# Patient Record
Sex: FEMALE | Race: WHITE | NOT HISPANIC OR LATINO
[De-identification: names, ages, dates, MRNs, and addresses within clinical notes are randomized per-mention and may not be internally consistent; named-entity substitution may affect disease eponyms.]

---

## 2017-02-20 ENCOUNTER — RX RENEWAL (OUTPATIENT)
Age: 47
End: 2017-02-20

## 2017-03-09 ENCOUNTER — RX RENEWAL (OUTPATIENT)
Age: 47
End: 2017-03-09

## 2017-04-10 ENCOUNTER — RX RENEWAL (OUTPATIENT)
Age: 47
End: 2017-04-10

## 2017-05-26 LAB — HBA1C MFR BLD HPLC: 6.2

## 2017-05-30 ENCOUNTER — APPOINTMENT (OUTPATIENT)
Dept: FAMILY MEDICINE | Facility: CLINIC | Age: 47
End: 2017-05-30

## 2017-05-30 VITALS
OXYGEN SATURATION: 98 % | TEMPERATURE: 98.5 F | BODY MASS INDEX: 28.71 KG/M2 | HEIGHT: 62 IN | HEART RATE: 72 BPM | DIASTOLIC BLOOD PRESSURE: 70 MMHG | WEIGHT: 156 LBS | SYSTOLIC BLOOD PRESSURE: 120 MMHG | RESPIRATION RATE: 14 BRPM

## 2017-10-26 ENCOUNTER — APPOINTMENT (OUTPATIENT)
Dept: FAMILY MEDICINE | Facility: CLINIC | Age: 47
End: 2017-10-26
Payer: MEDICARE

## 2017-10-26 VITALS — TEMPERATURE: 98.3 F | DIASTOLIC BLOOD PRESSURE: 60 MMHG | SYSTOLIC BLOOD PRESSURE: 111 MMHG

## 2017-10-26 PROCEDURE — G0008: CPT

## 2017-10-26 PROCEDURE — 90688 IIV4 VACCINE SPLT 0.5 ML IM: CPT

## 2018-05-10 ENCOUNTER — OTHER (OUTPATIENT)
Age: 48
End: 2018-05-10

## 2018-06-04 ENCOUNTER — APPOINTMENT (OUTPATIENT)
Dept: FAMILY MEDICINE | Facility: CLINIC | Age: 48
End: 2018-06-04
Payer: MEDICARE

## 2018-06-04 VITALS
TEMPERATURE: 98.1 F | DIASTOLIC BLOOD PRESSURE: 76 MMHG | RESPIRATION RATE: 14 BRPM | WEIGHT: 168 LBS | SYSTOLIC BLOOD PRESSURE: 122 MMHG | OXYGEN SATURATION: 97 % | BODY MASS INDEX: 30.91 KG/M2 | HEART RATE: 70 BPM | HEIGHT: 62 IN

## 2018-06-04 PROCEDURE — G0439: CPT

## 2018-06-07 LAB — HBA1C MFR BLD HPLC: 6

## 2018-10-15 ENCOUNTER — APPOINTMENT (OUTPATIENT)
Dept: FAMILY MEDICINE | Facility: CLINIC | Age: 48
End: 2018-10-15
Payer: MEDICARE

## 2018-10-15 VITALS — TEMPERATURE: 97.6 F | SYSTOLIC BLOOD PRESSURE: 110 MMHG | DIASTOLIC BLOOD PRESSURE: 80 MMHG

## 2018-10-15 VITALS — HEART RATE: 64 BPM | OXYGEN SATURATION: 97 %

## 2018-10-15 DIAGNOSIS — T14.8XXA OTHER INJURY OF UNSPECIFIED BODY REGION, INITIAL ENCOUNTER: ICD-10-CM

## 2018-10-15 PROCEDURE — G0008: CPT

## 2018-10-15 PROCEDURE — 99213 OFFICE O/P EST LOW 20 MIN: CPT

## 2018-10-15 PROCEDURE — 90686 IIV4 VACC NO PRSV 0.5 ML IM: CPT
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
## 2018-10-15 NOTE — PHYSICAL EXAM
[No Acute Distress] : no acute distress [Clear to Auscultation] : lungs were clear to auscultation bilaterally [Alert and Oriented x3] : oriented to person, place, and time [de-identified] : irreg Gr 3/6 systolic Murmur [de-identified] : small mass non tender hematoma? in LLQ [de-identified] : ecchymosis diffuse on her right shoulder , chest and lower abdomen

## 2019-03-06 ENCOUNTER — APPOINTMENT (OUTPATIENT)
Dept: FAMILY MEDICINE | Facility: CLINIC | Age: 49
End: 2019-03-06
Payer: MEDICARE

## 2019-03-06 VITALS
TEMPERATURE: 98.1 F | HEIGHT: 62 IN | RESPIRATION RATE: 14 BRPM | WEIGHT: 169 LBS | BODY MASS INDEX: 31.1 KG/M2 | DIASTOLIC BLOOD PRESSURE: 78 MMHG | SYSTOLIC BLOOD PRESSURE: 110 MMHG | OXYGEN SATURATION: 96 % | HEART RATE: 66 BPM

## 2019-03-06 PROCEDURE — 99214 OFFICE O/P EST MOD 30 MIN: CPT

## 2019-07-22 ENCOUNTER — APPOINTMENT (OUTPATIENT)
Dept: FAMILY MEDICINE | Facility: CLINIC | Age: 49
End: 2019-07-22
Payer: MEDICARE

## 2019-07-22 VITALS
DIASTOLIC BLOOD PRESSURE: 70 MMHG | BODY MASS INDEX: 29.63 KG/M2 | WEIGHT: 161 LBS | HEIGHT: 62 IN | SYSTOLIC BLOOD PRESSURE: 115 MMHG | HEART RATE: 68 BPM | OXYGEN SATURATION: 98 % | RESPIRATION RATE: 14 BRPM | TEMPERATURE: 97.9 F

## 2019-07-22 PROCEDURE — G0439: CPT

## 2019-07-22 NOTE — PLAN
[FreeTextEntry1] : Health Maintenance\par - F/u in October \par - FIT test \par \par Encouraged exercise and weight los

## 2019-07-22 NOTE — ADDENDUM
[FreeTextEntry1] : I, Ann Berry, acted solely as a scribe for Dr. Spence on this date 07/22/2019.\par \par All medical record entries made by the Scribe were at my, Dr. Spence, direction and personally dictated by me on 07/22/2019. I have reviewed the chart and agree that the record accurately reflects my personal performance of the history, physical exam, assessment and plan.  I have also personally directed, reviewed and agreed with the chart.

## 2019-07-22 NOTE — REVIEW OF SYSTEMS
[Recent Change In Weight] : ~T recent weight change [Negative] : Neurological [Fever] : no fever [Chills] : no chills [Chest Pain] : no chest pain [Shortness Of Breath] : no shortness of breath [Nausea] : no nausea [Vomiting] : no vomiting [Headache] : no headache [FreeTextEntry2] : 8lbs

## 2019-07-22 NOTE — HISTORY OF PRESENT ILLNESS
[de-identified] : 48y/o F with PMHx of Afib, Left ventricular dysfunction, Valvular disease, Graves, Hyperglycemia, HTN (Ramipril, Metoprolol) and Hypothyroidism  presents to the office for routine Medicare Annual Wellness Exam. Offers no new complaints at this time. Pt will be having echocardiogram  in a few weeks. Pt has f/u with Cardiology in 2 months and f/u with Endo in 1 month. Pt presents with 8lb weight loss, has been consistent with monitoring diet. Pt does yoga for exercise. All routine labs reviewed with patient and WNL except for HbA1c at 6.0. BP in office is 115/70 and Bglu is 90. Last eye exam UTD. Denies HA, CP, SOB, N/V/D, fever or chills.   Had D&C few months ago NEG result\par

## 2019-07-22 NOTE — PHYSICAL EXAM
[Normal] : no carotid bruits, no abdominal bruit, no varicosities, pedal pulses present, no edema, no extremity clubbing/cyanosis, no palpable aorta [de-identified] :  irreg irreg [de-identified] : Varicose veins L more than R [de-identified] : Warm, dry, intact.  No rashes.  [de-identified] : AA&Ox3

## 2019-07-24 LAB — CYTOLOGY CVX/VAG DOC THIN PREP: NORMAL

## 2019-08-01 LAB — HEMOCCULT STL QL IA: NEGATIVE

## 2019-09-27 ENCOUNTER — RX RENEWAL (OUTPATIENT)
Age: 49
End: 2019-09-27

## 2019-10-22 ENCOUNTER — APPOINTMENT (OUTPATIENT)
Dept: FAMILY MEDICINE | Facility: CLINIC | Age: 49
End: 2019-10-22
Payer: MEDICARE

## 2019-10-22 PROCEDURE — 90686 IIV4 VACC NO PRSV 0.5 ML IM: CPT

## 2019-10-22 PROCEDURE — G0008: CPT

## 2020-05-05 ENCOUNTER — APPOINTMENT (OUTPATIENT)
Dept: FAMILY MEDICINE | Facility: CLINIC | Age: 50
End: 2020-05-05
Payer: MEDICARE

## 2020-05-05 DIAGNOSIS — T30.0 BURN OF UNSPECIFIED BODY REGION, UNSPECIFIED DEGREE: ICD-10-CM

## 2020-05-05 DIAGNOSIS — T22.20XA BURN OF SECOND DEGREE OF SHOULDER AND UPPER LIMB, EXCEPT WRIST AND HAND, UNSPECIFIED SITE, INITIAL ENCOUNTER: ICD-10-CM

## 2020-05-05 PROCEDURE — 99214 OFFICE O/P EST MOD 30 MIN: CPT
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
## 2020-05-27 DIAGNOSIS — D64.9 ANEMIA, UNSPECIFIED: ICD-10-CM

## 2020-06-02 ENCOUNTER — APPOINTMENT (OUTPATIENT)
Dept: FAMILY MEDICINE | Facility: CLINIC | Age: 50
End: 2020-06-02
Payer: MEDICARE

## 2020-06-02 VITALS
OXYGEN SATURATION: 97 % | HEART RATE: 73 BPM | BODY MASS INDEX: 30 KG/M2 | DIASTOLIC BLOOD PRESSURE: 80 MMHG | WEIGHT: 163 LBS | RESPIRATION RATE: 14 BRPM | HEIGHT: 62 IN | TEMPERATURE: 98.6 F | SYSTOLIC BLOOD PRESSURE: 116 MMHG

## 2020-06-02 PROCEDURE — 99214 OFFICE O/P EST MOD 30 MIN: CPT
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
## 2020-06-02 NOTE — ASSESSMENT
[No Further Testing Recommended] : no further testing recommended [Patient Optimized for Surgery] : Patient optimized for surgery [FreeTextEntry5] : pt stopped Coumadin 48 hours before surgery [FreeTextEntry7] : will continue   Metoprolol on day of surgery
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
## 2020-06-02 NOTE — RESULTS/DATA
[de-identified] : normal [] : results reviewed [de-identified] : Shelley @56 (unchanged from previous)

## 2020-08-12 LAB
INR PPP: 2.16
PROTHROMBIN TIME COMMENT: NORMAL
PT BLD: 23.2

## 2020-09-04 LAB
INR PPP: 3.08
PROTHROMBIN TIME COMMENT: NORMAL
PT BLD: 32.4

## 2020-10-05 LAB
INR PPP: 3.11
PROTHROMBIN TIME COMMENT: NORMAL
PT BLD: 32.7

## 2020-10-06 LAB
INR PPP: 2.4
PROTHROMBIN TIME COMMENT: NORMAL
PT BLD: 25.6

## 2020-10-20 LAB
INR PPP: 1.67
PROTHROMBIN TIME COMMENT: NORMAL

## 2020-10-26 ENCOUNTER — APPOINTMENT (OUTPATIENT)
Dept: FAMILY MEDICINE | Facility: CLINIC | Age: 50
End: 2020-10-26
Payer: MEDICARE

## 2020-10-26 VITALS
TEMPERATURE: 97.6 F | HEIGHT: 62 IN | DIASTOLIC BLOOD PRESSURE: 80 MMHG | RESPIRATION RATE: 14 BRPM | OXYGEN SATURATION: 99 % | SYSTOLIC BLOOD PRESSURE: 122 MMHG | WEIGHT: 157 LBS | HEART RATE: 77 BPM | BODY MASS INDEX: 28.89 KG/M2

## 2020-10-26 PROCEDURE — G0008: CPT

## 2020-10-26 PROCEDURE — 90686 IIV4 VACC NO PRSV 0.5 ML IM: CPT

## 2020-10-26 PROCEDURE — G0439: CPT

## 2020-10-29 LAB
INR PPP: 3.28
PROTHROMBIN TIME COMMENT: NORMAL
PT BLD: 34.3

## 2020-11-10 DIAGNOSIS — R92.2 INCONCLUSIVE MAMMOGRAM: ICD-10-CM

## 2020-11-13 LAB
INR PPP: 2.73
PT BLD: 28.9

## 2020-11-17 ENCOUNTER — NON-APPOINTMENT (OUTPATIENT)
Age: 50
End: 2020-11-17

## 2020-11-17 ENCOUNTER — APPOINTMENT (OUTPATIENT)
Dept: CARDIOLOGY | Facility: CLINIC | Age: 50
End: 2020-11-17
Payer: MEDICARE

## 2020-11-17 VITALS
WEIGHT: 159 LBS | BODY MASS INDEX: 29.08 KG/M2 | TEMPERATURE: 97.6 F | OXYGEN SATURATION: 97 % | HEART RATE: 75 BPM | DIASTOLIC BLOOD PRESSURE: 86 MMHG | SYSTOLIC BLOOD PRESSURE: 124 MMHG

## 2020-11-17 PROCEDURE — 99204 OFFICE O/P NEW MOD 45 MIN: CPT

## 2020-11-17 PROCEDURE — 93000 ELECTROCARDIOGRAM COMPLETE: CPT

## 2020-11-17 PROCEDURE — 99214 OFFICE O/P EST MOD 30 MIN: CPT

## 2020-11-19 NOTE — ASSESSMENT
[FreeTextEntry1] : Dominique Moran continues to do well from a cardiac point of view with atrial fibrillation with a controlled ventricular response, no clear evidence of CHF despite moderate LV dysfunction and moderate to severe tricuspid insufficiency.  She is tolerating her present regimen of medications\par \par 1.  Congenital heart disease\par 2.  Status post mitral valve replacement tricuspid valve replacement residual tricuspid insufficiency and dilated right ventricle presently asymptomatic\par 3.  Atrial fibrillation with controlled ventricular response\par 4.  On long-term anticoagulation tolerating Coumadin with INRs in good range between 2 and 2.5\par 5.  Cardiomyopathy LV function has improved last ejection fraction 43% no overt CHF excellent exercise tolerance
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
## 2020-11-19 NOTE — DISCUSSION/SUMMARY
[FreeTextEntry1] : The patient continue on her present regimen of medications.  We will maintain INR between 2 and 2.5 on Coumadin.\par \par Within the next 6 months we will repeat an echocardiogram to reevaluate her tricuspid insufficiency and LV function.\par \par There may come a point in which a tricuspid valve procedure perhaps percutaneous may need to be considered if RV function further diminishes or RV dilates further\par \par Follow-up with me again in 3 months

## 2020-11-19 NOTE — REASON FOR VISIT
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

## 2020-12-04 LAB
INR PPP: 2.76
PROTHROMBIN TIME COMMENT: NORMAL
PT BLD: 29.2

## 2020-12-11 ENCOUNTER — RX RENEWAL (OUTPATIENT)
Age: 50
End: 2020-12-11

## 2020-12-15 LAB
INR PPP: 2.68
PROTHROMBIN TIME COMMENT: NORMAL
PT BLD: 28.4

## 2021-01-07 LAB
INR PPP: 2.53
PROTHROMBIN TIME COMMENT: NORMAL
PT BLD: 26.9

## 2021-01-15 ENCOUNTER — NON-APPOINTMENT (OUTPATIENT)
Age: 51
End: 2021-01-15

## 2021-01-22 LAB
INR PPP: 2.23
PROTHROMBIN TIME COMMENT: NORMAL
PT BLD: 23.9

## 2021-02-04 LAB
INR PPP: 2.19
PROTHROMBIN TIME COMMENT: NORMAL
PT BLD: 23.5

## 2021-02-16 ENCOUNTER — APPOINTMENT (OUTPATIENT)
Dept: CARDIOLOGY | Facility: CLINIC | Age: 51
End: 2021-02-16
Payer: MEDICARE

## 2021-02-16 VITALS
HEART RATE: 80 BPM | OXYGEN SATURATION: 97 % | SYSTOLIC BLOOD PRESSURE: 124 MMHG | DIASTOLIC BLOOD PRESSURE: 76 MMHG | BODY MASS INDEX: 29.45 KG/M2 | WEIGHT: 161 LBS

## 2021-02-16 PROCEDURE — 99213 OFFICE O/P EST LOW 20 MIN: CPT
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
## 2021-03-01 ENCOUNTER — NON-APPOINTMENT (OUTPATIENT)
Age: 51
End: 2021-03-01

## 2021-03-01 LAB
INR PPP: 2.35
PROTHROMBIN TIME COMMENT: NORMAL
PT BLD: 25.1

## 2021-03-19 ENCOUNTER — OUTPATIENT (OUTPATIENT)
Dept: OUTPATIENT SERVICES | Facility: HOSPITAL | Age: 51
LOS: 1 days | End: 2021-03-19
Payer: MEDICARE

## 2021-03-19 ENCOUNTER — APPOINTMENT (OUTPATIENT)
Dept: CV DIAGNOSITCS | Facility: HOSPITAL | Age: 51
End: 2021-03-19

## 2021-03-19 DIAGNOSIS — I48.91 UNSPECIFIED ATRIAL FIBRILLATION: ICD-10-CM

## 2021-03-19 LAB
INR PPP: 2.33
PROTHROMBIN TIME COMMENT: NORMAL
PT BLD: 23

## 2021-03-19 PROCEDURE — C8929: CPT

## 2021-03-19 PROCEDURE — 93306 TTE W/DOPPLER COMPLETE: CPT | Mod: 26

## 2021-03-26 ENCOUNTER — APPOINTMENT (OUTPATIENT)
Dept: CARDIOLOGY | Facility: CLINIC | Age: 51
End: 2021-03-26
Payer: MEDICARE

## 2021-03-26 ENCOUNTER — NON-APPOINTMENT (OUTPATIENT)
Age: 51
End: 2021-03-26

## 2021-03-26 VITALS
WEIGHT: 158 LBS | BODY MASS INDEX: 29.08 KG/M2 | DIASTOLIC BLOOD PRESSURE: 58 MMHG | HEART RATE: 73 BPM | SYSTOLIC BLOOD PRESSURE: 115 MMHG | HEIGHT: 62 IN | OXYGEN SATURATION: 98 %

## 2021-03-26 DIAGNOSIS — Q21.0 VENTRICULAR SEPTAL DEFECT: ICD-10-CM

## 2021-03-26 PROCEDURE — 99213 OFFICE O/P EST LOW 20 MIN: CPT

## 2021-03-26 PROCEDURE — 93000 ELECTROCARDIOGRAM COMPLETE: CPT

## 2021-04-02 LAB
INR PPP: 1.81
PROTHROMBIN TIME COMMENT: NORMAL
PT BLD: 19.3

## 2021-04-09 LAB
INR PPP: 1.52
PROTHROMBIN TIME COMMENT: NORMAL
PT BLD: 15.6

## 2021-04-18 LAB
INR PPP: 2.03
PROTHROMBIN TIME COMMENT: NORMAL
PT BLD: 20.3

## 2021-04-29 LAB
INR PPP: 1.95
PROTHROMBIN TIME COMMENT: NORMAL
PT BLD: 19.6

## 2021-05-03 NOTE — HISTORY OF PRESENT ILLNESS
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  She is status post tricuspid and mitral valve replacement.  She has chronic atrial fibrillation\par \par She is done extremely well and functions at a very good level living independently in her own home and working.  She exercises regularly.  She denies chest pain chest pressure shortness of breath edema orthopnea PND.  She has good exercise tolerance\par \par She has a history of hypothyroidism on replacement\par \par Recent echocardiogram again revealed moderate LV dysfunction with moderate tricuspid insufficiency and mild to moderate mitral insufficiency.  On echo of 2020 tricuspid insufficiency was read as being severe\par \par Again presently she is asymptomatic\par \par EKG today unchanged A. fib right bundle branch block left axis deviation ventricular rate controlled

## 2021-05-03 NOTE — DISCUSSION/SUMMARY
[FreeTextEntry1] : Dominique is quite stable from a cardiac point of view.  Her atrial fibrillation is well controlled, there is no evidence of CHF and she is tolerating her present regimen of medications.  She has no overt evidence of CHF and is asymptomatic\par \par Her most the recent echo at [facility] revealed only mild to moderate tricuspid insufficiency where in the past that showed severe tricuspid insufficiency.  This have may have underestimated the significance of the tricuspid insufficiency which we need to watch carefully\par \par I see no cardiac contraindication to the planned colonoscopy\par Addendum:\par The patient remains asymptomatic from a cardiac point of view and her echo appears to be stable.  There is no indication at the present time for consideration of tricuspid valve surgery etc.  She is doing extremely well on her present medical regimen and her echocardiogram is stable\par \par She will continue to be active and exercise and continue present medications and follow-up with me again in 3 months\par \par Alden Gale MD\par She can hold Coumadin 3 days prior to the procedure but should start immediately thereafter

## 2021-05-03 NOTE — PHYSICAL EXAM
[Normal Appearance] : normal appearance [General Appearance - Well Nourished] : well nourished [General Appearance - In No Acute Distress] : no acute distress [Respiration, Rhythm And Depth] : normal respiratory rhythm and effort [Auscultation Breath Sounds / Voice Sounds] : lungs were clear to auscultation bilaterally [Heart Sounds] : normal S1 and S2 [Cyanosis, Localized] : no localized cyanosis [FreeTextEntry1] : No edema

## 2021-05-03 NOTE — REASON FOR VISIT
[FreeTextEntry1] : Dominique Dinh 51 years old seen for preop evaluation prior to undergoing colonoscopy.  The colonoscopy is a routine

## 2021-05-12 LAB
INR PPP: 2.79
PROTHROMBIN TIME COMMENT: NORMAL
PT BLD: 27.2

## 2021-06-01 LAB
INR PPP: 2.33
PROTHROMBIN TIME COMMENT: NORMAL
PT BLD: 23

## 2021-06-10 ENCOUNTER — APPOINTMENT (OUTPATIENT)
Dept: CARDIOLOGY | Facility: CLINIC | Age: 51
End: 2021-06-10
Payer: MEDICARE

## 2021-06-10 ENCOUNTER — NON-APPOINTMENT (OUTPATIENT)
Age: 51
End: 2021-06-10

## 2021-06-10 VITALS
DIASTOLIC BLOOD PRESSURE: 76 MMHG | WEIGHT: 156 LBS | HEART RATE: 65 BPM | HEIGHT: 62 IN | SYSTOLIC BLOOD PRESSURE: 119 MMHG | OXYGEN SATURATION: 98 % | BODY MASS INDEX: 28.71 KG/M2

## 2021-06-10 PROCEDURE — 99214 OFFICE O/P EST MOD 30 MIN: CPT

## 2021-06-10 PROCEDURE — 93000 ELECTROCARDIOGRAM COMPLETE: CPT

## 2021-06-10 NOTE — PHYSICAL EXAM
[Normal Appearance] : normal appearance [General Appearance - Well Nourished] : well nourished [General Appearance - In No Acute Distress] : no acute distress [Respiration, Rhythm And Depth] : normal respiratory rhythm and effort [Auscultation Breath Sounds / Voice Sounds] : lungs were clear to auscultation bilaterally [Heart Sounds] : normal S1 and S2 [Cyanosis, Localized] : no localized cyanosis [de-identified] : Mild JVD [FreeTextEntry1] : No edema.  The patient was wearing shorts today and she has significant varicosities in the upper thigh area and in the lower extremity no redness no hematoma no significant ecchymoses

## 2021-06-10 NOTE — REASON FOR VISIT
[FreeTextEntry1] : Dominique Erik Cueto 51 years old here for routine follow-up of her cardiac status with known congenital heart disease status post repair.

## 2021-06-10 NOTE — DISCUSSION/SUMMARY
[FreeTextEntry1] : Dominique is quite stable from a cardiac point of view.  Her atrial fibrillation is well controlled, there is no evidence of CHF and she is tolerating her present regimen of medications.  She has no overt evidence of CHF and is asymptomatic\par \par Her most the recent echo at [facility] revealed only mild to moderate tricuspid insufficiency where in the past that showed severe tricuspid insufficiency.  This have may have underestimated the significance of the tricuspid insufficiency which we need to watch carefully.  She does have significant varicosities of the lower extremities but has no significant ecchymoses or hematoma\par \par She will continue to be active and exercise and continue present medications and follow-up with me again in 3 months.  No further cardiac work-up is needed\par \par
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
## 2021-07-09 LAB
INR PPP: 2.29
PROTHROMBIN TIME COMMENT: NORMAL
PT BLD: 22.7

## 2021-08-16 LAB
INR PPP: 2.19
PROTHROMBIN TIME COMMENT: NORMAL

## 2021-08-17 ENCOUNTER — APPOINTMENT (OUTPATIENT)
Dept: RADIOLOGY | Facility: CLINIC | Age: 51
End: 2021-08-17
Payer: MEDICARE

## 2021-08-17 PROCEDURE — 77085 DXA BONE DENSITY AXL VRT FX: CPT

## 2021-09-09 ENCOUNTER — NON-APPOINTMENT (OUTPATIENT)
Age: 51
End: 2021-09-09

## 2021-09-09 ENCOUNTER — APPOINTMENT (OUTPATIENT)
Dept: CARDIOLOGY | Facility: CLINIC | Age: 51
End: 2021-09-09
Payer: MEDICARE

## 2021-09-09 VITALS
DIASTOLIC BLOOD PRESSURE: 88 MMHG | BODY MASS INDEX: 28.53 KG/M2 | SYSTOLIC BLOOD PRESSURE: 112 MMHG | HEART RATE: 72 BPM | WEIGHT: 156 LBS | OXYGEN SATURATION: 98 %

## 2021-09-09 PROCEDURE — 99214 OFFICE O/P EST MOD 30 MIN: CPT

## 2021-09-09 PROCEDURE — 93000 ELECTROCARDIOGRAM COMPLETE: CPT

## 2021-09-09 NOTE — HISTORY OF PRESENT ILLNESS
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  She is status post tricuspid and mitral valve replacement.  She has chronic atrial fibrillation\par \par She is done extremely well and functions at a very good level living independently in her own home and working.  She exercises regularly.  She denies chest pain chest pressure shortness of breath edema orthopnea PND.  She has good exercise tolerance\par \par She has a history of hypothyroidism on replacement\par \par Recent echocardiogram again revealed moderate LV dysfunction with moderate tricuspid insufficiency and mild to moderate mitral insufficiency.  On echo of 2020 tricuspid insufficiency was read as being severe\par \par Again presently she is asymptomatic\par \par EKG last visit revealed A. fib right bundle branch block left axis deviation ventricular rate controlled\par \par Since the last visit, she continues to do well.  She denies chest pain shortness of breath palpitations edema orthopnea PND\par \par EKG today again unchanged right bundle branch block A. fib ventricular rate controlled\par \par There has been no significant change in his status since last visit.  She is active does yoga her regularly has no edema orthopnea PND\par \par \par \par Last INR was 2.3 on Coumadin 10 mg 5 days a week 7.5 2 days a week

## 2021-09-09 NOTE — PHYSICAL EXAM
[Well Developed] : well developed [Well Nourished] : well nourished [Normal Conjunctiva] : normal conjunctiva [Normal S1, S2] : normal S1, S2 [Clear Lung Fields] : clear lung fields [Soft] : abdomen soft [Non Tender] : non-tender [No Edema] : no edema [Normal Appearance] : normal appearance [General Appearance - Well Nourished] : well nourished [General Appearance - In No Acute Distress] : no acute distress [Respiration, Rhythm And Depth] : normal respiratory rhythm and effort [Auscultation Breath Sounds / Voice Sounds] : lungs were clear to auscultation bilaterally [Heart Sounds] : normal S1 and S2 [Cyanosis, Localized] : no localized cyanosis [de-identified] : No significant neck vein distention [de-identified] : 1/6 to 2/6 murmur at the apex and left sternal border no S3 irregularly irregular pulse [de-identified] : Slight varicosities of the lower extremities no redness pulses 2+ dorsalis pedis [FreeTextEntry1] : No edema.

## 2021-09-09 NOTE — ASSESSMENT
[FreeTextEntry1] : Dominique is quite stable from a cardiac point of view.  Her atrial fibrillation is well controlled, there is no evidence of CHF and she is tolerating her present regimen of medications.  She has no overt evidence of CHF and is asymptomatic\par \par Her most the recent echo at [facility] revealed only mild to moderate tricuspid insufficiency where in the past that showed severe tricuspid insufficiency.  This have may have underestimated the significance of the tricuspid insufficiency which we need to watch carefully.\par \par She continues to function at an excellent level.  She is asymptomatic and tolerating her present regimen of medications\par

## 2021-09-09 NOTE — DISCUSSION/SUMMARY
[FreeTextEntry1] : Dominique remains quite stable from a cardiac point of view tolerating her present regimen of medications\par She will continue to be active and exercise and continue present medications and follow-up with me again in 3 months.  No further cardiac work-up is needed\par \par

## 2021-09-21 ENCOUNTER — APPOINTMENT (OUTPATIENT)
Dept: FAMILY MEDICINE | Facility: CLINIC | Age: 51
End: 2021-09-21
Payer: MEDICARE

## 2021-09-21 ENCOUNTER — NON-APPOINTMENT (OUTPATIENT)
Age: 51
End: 2021-09-21

## 2021-09-21 PROCEDURE — 90686 IIV4 VACC NO PRSV 0.5 ML IM: CPT

## 2021-09-21 PROCEDURE — G0008: CPT

## 2021-09-29 LAB
INR PPP: 2.11
PT BLD: 21

## 2021-10-29 LAB
INR PPP: 2.14
PROTHROMBIN TIME COMMENT: NORMAL
PT BLD: 21.3

## 2021-12-06 LAB
INR PPP: 2.28
PROTHROMBIN TIME COMMENT: NORMAL

## 2021-12-07 ENCOUNTER — APPOINTMENT (OUTPATIENT)
Dept: FAMILY MEDICINE | Facility: CLINIC | Age: 51
End: 2021-12-07
Payer: MEDICARE

## 2021-12-07 ENCOUNTER — NON-APPOINTMENT (OUTPATIENT)
Age: 51
End: 2021-12-07

## 2021-12-07 ENCOUNTER — APPOINTMENT (OUTPATIENT)
Dept: CARDIOLOGY | Facility: CLINIC | Age: 51
End: 2021-12-07
Payer: MEDICARE

## 2021-12-07 VITALS
HEART RATE: 74 BPM | OXYGEN SATURATION: 96 % | HEIGHT: 62 IN | WEIGHT: 157 LBS | BODY MASS INDEX: 28.89 KG/M2 | DIASTOLIC BLOOD PRESSURE: 70 MMHG | SYSTOLIC BLOOD PRESSURE: 124 MMHG

## 2021-12-07 VITALS
RESPIRATION RATE: 14 BRPM | TEMPERATURE: 96 F | HEART RATE: 71 BPM | OXYGEN SATURATION: 98 % | DIASTOLIC BLOOD PRESSURE: 76 MMHG | WEIGHT: 155 LBS | HEIGHT: 62 IN | BODY MASS INDEX: 28.52 KG/M2 | SYSTOLIC BLOOD PRESSURE: 122 MMHG

## 2021-12-07 DIAGNOSIS — R73.9 HYPERGLYCEMIA, UNSPECIFIED: ICD-10-CM

## 2021-12-07 DIAGNOSIS — Z00.00 ENCOUNTER FOR GENERAL ADULT MEDICAL EXAMINATION W/OUT ABNORMAL FINDINGS: ICD-10-CM

## 2021-12-07 PROCEDURE — 93000 ELECTROCARDIOGRAM COMPLETE: CPT

## 2021-12-07 PROCEDURE — 99214 OFFICE O/P EST MOD 30 MIN: CPT

## 2021-12-07 PROCEDURE — G0439: CPT

## 2021-12-07 NOTE — HEALTH RISK ASSESSMENT
[None] : None [Alone] : lives alone [Fully functional (bathing, dressing, toileting, transferring, walking, feeding)] : Fully functional (bathing, dressing, toileting, transferring, walking, feeding) [Reports changes in hearing] : Reports no changes in hearing [Reports changes in vision] : Reports no changes in vision [Reports changes in dental health] : Reports no changes in dental health [de-identified] : no changes Pt has Downs Syndrom [de-identified] : [facility] [de-identified] : works 1 day a  week  at [employer]
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
## 2021-12-07 NOTE — PHYSICAL EXAM
[Alert and Oriented x3] : oriented to person, place, and time [de-identified] : juliane spencer Gr 2/6 systolic M [de-identified] : Downs Syndrome

## 2021-12-07 NOTE — HISTORY OF PRESENT ILLNESS
[de-identified] : 52 y/o FPMHx Downs Syndrome, s/p AVR TVR, Hypothyroid (Levothyroxine, A-Fib (Coumadin presents to office for Medicare Wellness exam. Pt feeling well. Seeing Cardiologist later today. UTD with immunizations. Mammo next week. Exercises 3x week (yoga)\par Labs reviewed WNL except HBA1C 6.0 (5.9), Sodium 132. Immunizations UTD

## 2021-12-07 NOTE — PHYSICAL EXAM
[No Acute Distress] : no acute distress [Well Nourished] : well nourished [Well Developed] : well developed [Well-Appearing] : well-appearing [Normal Sclera/Conjunctiva] : normal sclera/conjunctiva [PERRL] : pupils equal round and reactive to light [EOMI] : extraocular movements intact [Normal Outer Ear/Nose] : the outer ears and nose were normal in appearance [Normal Oropharynx] : the oropharynx was normal [No JVD] : no jugular venous distention [No Lymphadenopathy] : no lymphadenopathy [Supple] : supple [Thyroid Normal, No Nodules] : the thyroid was normal and there were no nodules present [No Respiratory Distress] : no respiratory distress  [No Accessory Muscle Use] : no accessory muscle use [Clear to Auscultation] : lungs were clear to auscultation bilaterally [No Carotid Bruits] : no carotid bruits [No Abdominal Bruit] : a ~M bruit was not heard ~T in the abdomen [No Varicosities] : no varicosities [Pedal Pulses Present] : the pedal pulses are present [No Edema] : there was no peripheral edema [No Palpable Aorta] : no palpable aorta [No Extremity Clubbing/Cyanosis] : no extremity clubbing/cyanosis [Soft] : abdomen soft [Non Tender] : non-tender [Non-distended] : non-distended [No Masses] : no abdominal mass palpated [No HSM] : no HSM [Normal Bowel Sounds] : normal bowel sounds [Normal Posterior Cervical Nodes] : no posterior cervical lymphadenopathy [Normal Anterior Cervical Nodes] : no anterior cervical lymphadenopathy [No CVA Tenderness] : no CVA  tenderness [No Spinal Tenderness] : no spinal tenderness [No Joint Swelling] : no joint swelling [Grossly Normal Strength/Tone] : grossly normal strength/tone [No Rash] : no rash [Coordination Grossly Intact] : coordination grossly intact [No Focal Deficits] : no focal deficits [Normal Gait] : normal gait [Deep Tendon Reflexes (DTR)] : deep tendon reflexes were 2+ and symmetric [Normal Affect] : the affect was normal [Normal Insight/Judgement] : insight and judgment were intact [de-identified] : Irreg Irreg Gr 2/6 systolic M

## 2021-12-08 NOTE — PHYSICAL EXAM
[Well Developed] : well developed [Well Nourished] : well nourished [Normal Conjunctiva] : normal conjunctiva [Normal S1, S2] : normal S1, S2 [Clear Lung Fields] : clear lung fields [Soft] : abdomen soft [Non Tender] : non-tender [No Edema] : no edema [Normal Appearance] : normal appearance [General Appearance - Well Nourished] : well nourished [General Appearance - In No Acute Distress] : no acute distress [Respiration, Rhythm And Depth] : normal respiratory rhythm and effort [Auscultation Breath Sounds / Voice Sounds] : lungs were clear to auscultation bilaterally [Heart Sounds] : normal S1 and S2 [Cyanosis, Localized] : no localized cyanosis [de-identified] : No significant neck vein distention [de-identified] : 1/6 to 2/6 murmur at the apex and left sternal border no S3 irregularly irregular pulse [de-identified] : Slight varicosities of the lower extremities no redness pulses 2+ dorsalis pedis [FreeTextEntry1] : No edema.

## 2021-12-08 NOTE — DISCUSSION/SUMMARY
[FreeTextEntry1] : Patient will continue to be active and exercise and continue her present regimen of medication\par \par At some point in the future we will repeat an echocardiogram\par \par Maintain INR between 2 and 2.5 which has been fairly reliably maintained\par \par Follow with me in 3 months echocardiogram sometime in the future

## 2021-12-08 NOTE — HISTORY OF PRESENT ILLNESS
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  She is status post tricuspid and mitral valve replacement.  She has chronic atrial fibrillation\par \par She is done extremely well and functions at a very good level living independently in her own home and working.  She exercises regularly.  She denies chest pain chest pressure shortness of breath edema orthopnea PND.  She has good exercise tolerance\par \par She has a history of hypothyroidism on replacement\par \par Recent echocardiogram again revealed moderate LV dysfunction with moderate tricuspid insufficiency and mild to moderate mitral insufficiency.  On echo of 2020 tricuspid insufficiency was read as being severe\par \par Again presently she is asymptomatic\par \par EKG last visit revealed A. fib right bundle branch block left axis deviation ventricular rate controlled\par \par EKG today again unchanged right bundle branch block A. fib ventricular rate controlled\par \par There has been no significant change in his status since last visit.  She is active does yoga her regularly has no edema orthopnea PND. She lives fairly independently in her own home and is able to take care of activities. She is working 2 days a week and enjoys it. There has been no change in her cardiac status\par \par \par \par \par \par \par Last INR was 2.3 on Coumadin 10 mg 5 days a week 7.5 2 days a week

## 2021-12-08 NOTE — ASSESSMENT
[FreeTextEntry1] : Dominique is quite stable from a cardiac point of view.  Her atrial fibrillation is well controlled, there is no evidence of CHF and she is tolerating her present regimen of medications.  She has no overt evidence of CHF and is asymptomatic\par \par Her most the recent echo at [facility] revealed only mild to moderate tricuspid insufficiency where in the past that showed severe tricuspid insufficiency.  This have may have underestimated the significance of the tricuspid insufficiency which we need to watch carefully.\par \par She continues to function at an excellent level.  She is asymptomatic and tolerating her present regimen of medications\par \par Since last visit there has been no change in her status her EKG is unchanged her atrial fibrillation is well controlled

## 2021-12-08 NOTE — REASON FOR VISIT
negative
[FreeTextEntry1] : Dominique Erik Cueto 51 years old here for routine follow-up of her cardiac status with known congenital heart disease status post repair.

## 2022-01-13 LAB
INR PPP: 2.07
PROTHROMBIN TIME COMMENT: NORMAL
PT BLD: 20.7

## 2022-02-09 LAB
INR PPP: 2.5
PROTHROMBIN TIME COMMENT: NORMAL
PT BLD: 25.1

## 2022-03-08 LAB
INR PPP: 2.25
PROTHROMBIN TIME COMMENT: NORMAL
PT BLD: 22.3

## 2022-03-09 ENCOUNTER — APPOINTMENT (OUTPATIENT)
Dept: CARDIOLOGY | Facility: CLINIC | Age: 52
End: 2022-03-09
Payer: MEDICARE

## 2022-03-09 ENCOUNTER — NON-APPOINTMENT (OUTPATIENT)
Age: 52
End: 2022-03-09

## 2022-03-09 VITALS
HEIGHT: 62 IN | HEART RATE: 71 BPM | SYSTOLIC BLOOD PRESSURE: 126 MMHG | OXYGEN SATURATION: 95 % | WEIGHT: 159 LBS | BODY MASS INDEX: 29.26 KG/M2 | DIASTOLIC BLOOD PRESSURE: 80 MMHG

## 2022-03-09 PROCEDURE — 93000 ELECTROCARDIOGRAM COMPLETE: CPT

## 2022-03-09 PROCEDURE — 99214 OFFICE O/P EST MOD 30 MIN: CPT

## 2022-03-09 NOTE — ASSESSMENT
[FreeTextEntry1] : Dominique is quite stable from a cardiac point of view.  Her atrial fibrillation is well controlled, there is no evidence of CHF and she is tolerating her present regimen of medications.  She has no overt evidence of CHF and is asymptomatic\par \par Her most the recent echo at [facility] March 2021 revealed only mild to moderate tricuspid insufficiency where in the past that showed severe tricuspid insufficiency.  However LV function was read as significantly lower ejection fraction 25 to 30% from the prior echo from a year before with ejection fraction was 43%.  It appears that the LV borders were difficult to define\par She continues to function at an excellent level.  She is asymptomatic and tolerating her present regimen of medications\par \par Since last visit there has been no change in her status her EKG is unchanged her atrial fibrillation is well controlled.  She is functioning at an excellent level leading a full and active life

## 2022-03-09 NOTE — HISTORY OF PRESENT ILLNESS
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  She is status post tricuspid and mitral valve replacement.  She has chronic atrial fibrillation\par \par She is done extremely well and functions at a very good level living independently in her own home and working.  She exercises regularly.  She denies chest pain chest pressure shortness of breath edema orthopnea PND.  She has good exercise tolerance\par \par She has a history of hypothyroidism on replacement\par \par echocardiogram March 2021 again revealed moderate to severe LV dysfunction with moderate tricuspid insufficiency and mild to moderate mitral insufficiency.  On echo of 2020 tricuspid insufficiency was read as being severe but LV function ejection fraction was read as 43%.\par \par Again presently she is asymptomatic\par \par EKG last visit revealed A. fib right bundle branch block left axis deviation ventricular rate controlled\par \par Patient seen in September 2021 and there was no significant change in his status since last visit.  She is active does yoga her regularly has no edema orthopnea PND. She lives fairly independently in her own home and is able to take care of activities. She is working 2 days a week and enjoys it. There has been no change in her cardiac status\par \par She continues to feel well functioning at a very good level working 2 days a week doing yoga 3 days a week.  She denies chest pain chest pressure shortness of breath.  She denies palpitations dizziness or syncope\par \par She is compliant with her medications and her last INR was in range\par \par Recent blood tests cholesterol 140 LDL 79 HDL 49 hemoglobin A1c 6.0 potassium 5.4 creatinine 0.65\par \par \par \par \par \par \par \par \par Last INR was 2.3 on Coumadin 10 mg 5 days a week 7.5 2 days a week

## 2022-03-09 NOTE — PHYSICAL EXAM
[Well Developed] : well developed [Well Nourished] : well nourished [Normal Conjunctiva] : normal conjunctiva [Normal S1, S2] : normal S1, S2 [Clear Lung Fields] : clear lung fields [Soft] : abdomen soft [Non Tender] : non-tender [No Edema] : no edema [de-identified] : 1/6 to 2/6 murmur at the apex and left sternal border no S3 irregularly irregular pulse [de-identified] : No significant neck vein distention [de-identified] : Slight varicosities of the lower extremities no redness pulses 2+ dorsalis pedis [Normal Appearance] : normal appearance [General Appearance - Well Nourished] : well nourished [General Appearance - In No Acute Distress] : no acute distress [Respiration, Rhythm And Depth] : normal respiratory rhythm and effort [Auscultation Breath Sounds / Voice Sounds] : lungs were clear to auscultation bilaterally [Heart Sounds] : normal S1 and S2 [Cyanosis, Localized] : no localized cyanosis [FreeTextEntry1] : Trace nonpitting edema.

## 2022-03-09 NOTE — DISCUSSION/SUMMARY
[FreeTextEntry1] : The patient is asymptomatic and should continue on her present regimen of medications and present level of activity.  I am concerned that the last echo was read as worsened LV function.  I am not certain whether this is related to the technique and difficulty to see the endocardial borders\par \par I have suggested that she have a repeat echocardiogram and [facility] with Dr. Dayana Gale.\par \par Once results available I will discuss it with the patient and her mother

## 2022-03-17 ENCOUNTER — OUTPATIENT (OUTPATIENT)
Dept: OUTPATIENT SERVICES | Facility: HOSPITAL | Age: 52
LOS: 1 days | End: 2022-03-17
Payer: MEDICARE

## 2022-03-17 DIAGNOSIS — I35.0 NONRHEUMATIC AORTIC (VALVE) STENOSIS: ICD-10-CM

## 2022-03-17 PROCEDURE — 93306 TTE W/DOPPLER COMPLETE: CPT | Mod: 26

## 2022-03-17 PROCEDURE — C8929: CPT

## 2022-03-21 ENCOUNTER — NON-APPOINTMENT (OUTPATIENT)
Age: 52
End: 2022-03-21

## 2022-03-30 LAB
INR PPP: 1.71
PROTHROMBIN TIME COMMENT: NORMAL
PT BLD: 18.8

## 2022-04-27 LAB
INR PPP: 2.36
PROTHROMBIN TIME COMMENT: NORMAL
PT BLD: 25.6

## 2022-05-16 ENCOUNTER — RX RENEWAL (OUTPATIENT)
Age: 52
End: 2022-05-16

## 2022-05-25 LAB
INR PPP: 1.77
PROTHROMBIN TIME COMMENT: NORMAL
PT BLD: 19.5

## 2022-05-31 ENCOUNTER — APPOINTMENT (OUTPATIENT)
Dept: CARDIOLOGY | Facility: CLINIC | Age: 52
End: 2022-05-31
Payer: MEDICARE

## 2022-05-31 VITALS
WEIGHT: 154 LBS | BODY MASS INDEX: 28.17 KG/M2 | SYSTOLIC BLOOD PRESSURE: 114 MMHG | DIASTOLIC BLOOD PRESSURE: 78 MMHG | OXYGEN SATURATION: 96 % | HEART RATE: 95 BPM

## 2022-05-31 PROCEDURE — 99214 OFFICE O/P EST MOD 30 MIN: CPT

## 2022-05-31 NOTE — PHYSICAL EXAM
[Well Developed] : well developed [Well Nourished] : well nourished [Normal Conjunctiva] : normal conjunctiva [Normal S1, S2] : normal S1, S2 [Clear Lung Fields] : clear lung fields [Soft] : abdomen soft [Non Tender] : non-tender [No Edema] : no edema [de-identified] : No significant neck vein distention [de-identified] : 1/6 to 2/6 murmur at the apex and left sternal border no S3 irregularly irregular pulse [de-identified] : Slight varicosities of the lower extremities no redness pulses 2+ dorsalis pedis [Normal Appearance] : normal appearance [General Appearance - Well Nourished] : well nourished [General Appearance - In No Acute Distress] : no acute distress [Respiration, Rhythm And Depth] : normal respiratory rhythm and effort [Auscultation Breath Sounds / Voice Sounds] : lungs were clear to auscultation bilaterally [Heart Sounds] : normal S1 and S2 [Cyanosis, Localized] : no localized cyanosis [FreeTextEntry1] : Trace nonpitting edema.

## 2022-05-31 NOTE — DISCUSSION/SUMMARY
[FreeTextEntry1] : 1.  [facility] Dr. Rogers for evaluation for possible tricuspid valve replacement noninvasively.  This is probably experimental at this point but [facility] may have a protocol\par \par 2.  She probably will also need an MRI to evaluate RV volumes and also the severity of the tricuspid and mitral insufficiency\par \par 3.  In terms of her present epistaxis, if it continues she should see ENT for cauterization.  In the meantime she should keep her nasal passageways open and well-hydrated with nasal saline.\par \par Follow-up in 2 months

## 2022-05-31 NOTE — HISTORY OF PRESENT ILLNESS
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  She is status post tricuspid and mitral valve replacement.  She has chronic atrial fibrillation\par \par She is done extremely well and functions at a very good level living independently in her own home and working.  She exercises regularly.  She denies chest pain chest pressure shortness of breath edema orthopnea PND.  She has good exercise tolerance\par \par She has a history of hypothyroidism on replacement\par \par echocardiogram March 2021 again revealed moderate to severe LV dysfunction with moderate tricuspid insufficiency and mild to moderate mitral insufficiency.  On echo of 2020 tricuspid insufficiency was read as being severe but LV function ejection fraction was read as 43%.\par \par Again presently she is asymptomatic\par \par EKG last visit revealed A. fib right bundle branch block left axis deviation ventricular rate controlled\par \par Patient seen in September 2021 and there was no significant change in his status since last visit.  She is active does yoga her regularly has no edema orthopnea PND. She lives fairly independently in her own home and is able to take care of activities. She is working 2 days a week and enjoys it. There has been no change in her cardiac status\par \par She continues to feel well functioning at a very good level working 2 days a week doing yoga 3 days a week.  She denies chest pain chest pressure shortness of breath.  She denies palpitations dizziness or syncope\par \par She is compliant with her medications and her last INR was in range\par \par Recent blood tests cholesterol 140 LDL 79 HDL 49 hemoglobin A1c 6.0 potassium 5.4 creatinine 0.65\par \par On last visit in March 9, 2022 she underwent a repeat echocardiogram.  The echocardiogram revealed moderate to severe LV dysfunction mild mitral regurgitation, but now the tricuspid valve was well seen and there was severe tricuspid insufficiency with backup into the hepatic veins.\par \par I discussed with the mother at that visit the possibility of need for intervention on the tricuspid valve though the patient remains asymptomatic\par \par She had all of her procedures done at [facility] in 2015\par \par Presently she continues to feel well without shortness of breath palpitations or chest pain.  She denies edema orthopnea PND\par \par Over the last few days she has had recurrent epistaxis and a dry cough but no fever or chills.  She does not feel achiness or sickly\par \par \par \par \par \par \par \par \par \par \par \par Last INR was 2.3 on Coumadin 10 mg 5 days a week 7.5 2 days a week

## 2022-05-31 NOTE — REASON FOR VISIT
[FreeTextEntry1] : Dominique Erik Cueto de 2 years old here for follow-up of her cardiac status.  Over the last few days she has had recurrent epistaxis and a dry cough

## 2022-05-31 NOTE — ASSESSMENT
[FreeTextEntry1] : Dominique is quite stable from a cardiac point of view.  Her atrial fibrillation is well controlled, there is no evidence of CHF and she is tolerating her present regimen of medications.  She has no overt evidence of CHF and is asymptomatic\par \par Her last echocardiogram in March 2022 suggested severe tricuspid insufficiency with RV dysfunction and regurgitation into the hepatic veins as well as moderate to severe LV dysfunction which has been present in the past with mild mitral regurgitation\par \par Presently she has had some epistaxis and some dry cough.  She may have a early viral syndrome, doubt COVID-19\par \par She is presently hemodynamically stable and without symptoms of shortness of breath with stable vital signs and clear lungs

## 2022-06-01 ENCOUNTER — APPOINTMENT (OUTPATIENT)
Dept: FAMILY MEDICINE | Facility: CLINIC | Age: 52
End: 2022-06-01
Payer: MEDICARE

## 2022-06-01 VITALS
HEART RATE: 59 BPM | SYSTOLIC BLOOD PRESSURE: 126 MMHG | OXYGEN SATURATION: 96 % | TEMPERATURE: 99.6 F | DIASTOLIC BLOOD PRESSURE: 82 MMHG | RESPIRATION RATE: 14 BRPM

## 2022-06-01 DIAGNOSIS — J34.89 OTHER SPECIFIED DISORDERS OF NOSE AND NASAL SINUSES: ICD-10-CM

## 2022-06-01 DIAGNOSIS — J02.9 ACUTE PHARYNGITIS, UNSPECIFIED: ICD-10-CM

## 2022-06-01 DIAGNOSIS — R05.9 COUGH, UNSPECIFIED: ICD-10-CM

## 2022-06-01 DIAGNOSIS — R04.0 EPISTAXIS: ICD-10-CM

## 2022-06-01 PROCEDURE — 99214 OFFICE O/P EST MOD 30 MIN: CPT

## 2022-06-06 PROBLEM — R05.9 COUGH: Status: ACTIVE | Noted: 2022-06-06

## 2022-06-06 PROBLEM — R04.0 EPISTAXIS: Status: ACTIVE | Noted: 2022-06-06

## 2022-06-06 PROBLEM — J34.89 SINUS PRESSURE: Status: ACTIVE | Noted: 2022-06-06

## 2022-06-06 NOTE — HISTORY OF PRESENT ILLNESS
[Parent] : parent [FreeTextEntry8] : c/o cough x 1 week \par here with mother [place]\par persistent sinus discomfort which is also leading to 3 recurrent nose bleeds\par covid test negative\par 3 nose bleeding \par using ayr saline spray \par seen by cardio yesterday due to history of repaired tr and mr, on coumadin, last checked a week ago, therapeautic INR as per mother \par denies any fever or chills\par denies any recent travel \par vaccinated against covid 19 \par denies any history of asthma\par cough is worse at night\par denies any other associated symptoms\par denies any other complaints or concerns at this time \par

## 2022-06-06 NOTE — REVIEW OF SYSTEMS
[Nosebleed] : nosebleed [Nasal Discharge] : nasal discharge [Postnasal Drip] : postnasal drip [Cough] : cough [Negative] : Respiratory

## 2022-06-06 NOTE — ASSESSMENT
[FreeTextEntry1] : VSS \par symptoms most likely due to sinusitis \par medication as prescribed, medication education done \par cough medication sent \par advised to increase fluid intake, rest, and acetaminophen/ibuprofen prn pain/fever\par consider CXR if cough is persistent\par referred to ENT due to recurrent epistaxsis \par form filled out for patient \par follow up in office if sx persists or worsens\par patient and parent verbalizes understanding and patient is stable upon d/c\par

## 2022-06-06 NOTE — PHYSICAL EXAM
[Normal] : no posterior cervical lymphadenopathy and no anterior cervical lymphadenopathy [de-identified] : + maxillary sinus pressure, b/l erythematous nasal turbinates, dried blood in right nostril  [de-identified] : + transmitted upper airway sounds, no r/r/r

## 2022-07-01 LAB — INR PPP: 2.15

## 2022-07-21 ENCOUNTER — RX RENEWAL (OUTPATIENT)
Age: 52
End: 2022-07-21

## 2022-07-26 ENCOUNTER — APPOINTMENT (OUTPATIENT)
Dept: CARDIOLOGY | Facility: CLINIC | Age: 52
End: 2022-07-26

## 2022-07-27 ENCOUNTER — NON-APPOINTMENT (OUTPATIENT)
Age: 52
End: 2022-07-27

## 2022-07-29 LAB
INR PPP: 2.11
PROTHROMBIN TIME COMMENT: NORMAL
PT BLD: 23

## 2022-08-24 LAB
INR PPP: 2.52
PT BLD: 27.3

## 2022-10-05 LAB
INR PPP: 2.95
PT BLD: 31.7

## 2022-10-10 ENCOUNTER — TRANSCRIPTION ENCOUNTER (OUTPATIENT)
Age: 52
End: 2022-10-10

## 2022-10-12 ENCOUNTER — APPOINTMENT (OUTPATIENT)
Dept: CARDIOLOGY | Facility: CLINIC | Age: 52
End: 2022-10-12

## 2022-10-12 ENCOUNTER — NON-APPOINTMENT (OUTPATIENT)
Age: 52
End: 2022-10-12

## 2022-10-12 VITALS
DIASTOLIC BLOOD PRESSURE: 90 MMHG | BODY MASS INDEX: 27.42 KG/M2 | HEIGHT: 62 IN | SYSTOLIC BLOOD PRESSURE: 122 MMHG | HEART RATE: 72 BPM | WEIGHT: 149 LBS | OXYGEN SATURATION: 98 %

## 2022-10-12 PROCEDURE — 93000 ELECTROCARDIOGRAM COMPLETE: CPT

## 2022-10-12 PROCEDURE — 99214 OFFICE O/P EST MOD 30 MIN: CPT

## 2022-10-12 NOTE — REASON FOR VISIT
[FreeTextEntry1] : Dominique Erik Cueto 52 years old here for follow-up of her cardiac status.  On last visit I had discussed with the patient and her family about her echo in March 2022 which seemed to show worsening tricuspid insufficiency RV dysfunction and high right atrial pressure

## 2022-10-12 NOTE — HISTORY OF PRESENT ILLNESS
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  She is status post tricuspid and mitral valve replacement.  She has chronic atrial fibrillation\par \par She is done extremely well and functions at a very good level living independently in her own home and working.  She exercises regularly.  She denies chest pain chest pressure shortness of breath edema orthopnea PND.  She has good exercise tolerance\par \par She has a history of hypothyroidism on replacement\par \par echocardiogram March 2021 again revealed moderate to severe LV dysfunction with moderate tricuspid insufficiency and mild to moderate mitral insufficiency.  On echo of 2020 tricuspid insufficiency was read as being severe but LV function ejection fraction was read as 43%.\par \par Called,\par \par EKG last visit revealed A. fib right bundle branch block left axis deviation ventricular rate controlled\par \par Patient seen in September 2021 and there was no significant change in his status since last visit.  She is active does yoga her regularly has no edema orthopnea PND. She lives fairly independently in her own home and is able to take care of activities. She is working 2 days a week and enjoys it. There has been no change in her cardiac status\par \par She continues to feel well functioning at a very good level working 2 days a week doing yoga 3 days a week.  She denies chest pain chest pressure shortness of breath.  She denies palpitations dizziness or syncope\par \par She is compliant with her medications and her last INR was in range\par \par Recent blood tests cholesterol 140 LDL 79 HDL 49 hemoglobin A1c 6.0 potassium 5.4 creatinine 0.65\par \par On last visit in March 9, 2022 she underwent a repeat echocardiogram.  The echocardiogram revealed moderate to severe LV dysfunction mild mitral regurgitation, but now the tricuspid valve was well seen and there was severe tricuspid insufficiency with backup into the hepatic veins.\par \par I discussed with the mother at that visit the possibility of need for intervention on the tricuspid valve though the patient remains asymptomatic\par \par visit October 12, 2022: The patient continues to be asymptomatic and actually had a wonderful summer swimming on a daily basis without chest pain or shortness of breath, without edema orthopnea or PND.  \par \par EKG October 12, 2022 unchanged atrial fibrillation ventricular rate controlled right bundle branch block left anterior hemiblock\par \par \par

## 2022-10-12 NOTE — ASSESSMENT
[FreeTextEntry1] : Dominique is quite stable from a cardiac point of view.  Her atrial fibrillation is well controlled, there is no evidence of CHF and she is tolerating her present regimen of medications.  She has no overt evidence of CHF and is asymptomatic\par \par Her last echocardiogram in March 2022 suggested severe tricuspid insufficiency with RV dysfunction and regurgitation into the hepatic veins as well as moderate to severe LV dysfunction which has been present in the past with mild mitral regurgitation\par \par \par She is presently hemodynamically stable and without symptoms of shortness of breath with stable vital signs and clear lungs.  She was quite active in the summer without any symptoms.

## 2022-10-12 NOTE — PHYSICAL EXAM
[Well Developed] : well developed [Well Nourished] : well nourished [Normal Conjunctiva] : normal conjunctiva [Normal S1, S2] : normal S1, S2 [Clear Lung Fields] : clear lung fields [Soft] : abdomen soft [Non Tender] : non-tender [No Edema] : no edema [de-identified] : No significant neck vein distention [de-identified] : 1/6 to 2/6 murmur at the apex and left sternal border no S3 irregularly irregular pulse [de-identified] : Slight varicosities of the lower extremities no redness pulses 2+ dorsalis pedis [Normal Appearance] : normal appearance [General Appearance - Well Nourished] : well nourished [General Appearance - In No Acute Distress] : no acute distress [Auscultation Breath Sounds / Voice Sounds] : lungs were clear to auscultation bilaterally [Respiration, Rhythm And Depth] : normal respiratory rhythm and effort [Heart Sounds] : normal S1 and S2 [Cyanosis, Localized] : no localized cyanosis [FreeTextEntry1] : Trace nonpitting edema.

## 2022-10-12 NOTE — DISCUSSION/SUMMARY
[FreeTextEntry1] : I am concerned that her echo seems to be showing progression of tricuspid insufficiency with RV dysfunction and elevated right atrial pressure.\par \par I suggested to the mother and father and to the patient, to have a consultation with structural heart disease.  I offered them to go back to [place] but actually both the patient and the parents prefer to stay in [facility].\par \par I will asked the structural heart team at [place] to review the echocardiogram, consider a transesophageal echocardiogram and the possibility of valve in valve tricuspid replacement percutaneously\par  [EKG obtained to assist in diagnosis and management of assessed problem(s)] : EKG obtained to assist in diagnosis and management of assessed problem(s)

## 2022-10-18 ENCOUNTER — APPOINTMENT (OUTPATIENT)
Dept: FAMILY MEDICINE | Facility: CLINIC | Age: 52
End: 2022-10-18

## 2022-10-18 DIAGNOSIS — Z23 ENCOUNTER FOR IMMUNIZATION: ICD-10-CM

## 2022-10-18 PROCEDURE — G0008: CPT

## 2022-10-18 PROCEDURE — 90686 IIV4 VACC NO PRSV 0.5 ML IM: CPT

## 2022-10-20 ENCOUNTER — RX RENEWAL (OUTPATIENT)
Age: 52
End: 2022-10-20

## 2022-10-26 ENCOUNTER — RX RENEWAL (OUTPATIENT)
Age: 52
End: 2022-10-26

## 2022-11-18 LAB
INR PPP: 2.05
PROTHROMBIN TIME COMMENT: NORMAL
PT BLD: 22.4

## 2022-12-03 RX ORDER — MULTIVITAMIN
TABLET ORAL DAILY
Qty: 30 | Refills: 11 | Status: ACTIVE | COMMUNITY
Start: 2017-04-10 | End: 1900-01-01

## 2022-12-14 ENCOUNTER — RX RENEWAL (OUTPATIENT)
Age: 52
End: 2022-12-14

## 2022-12-14 LAB
INR PPP: 2.8
PT BLD: 31

## 2023-01-11 ENCOUNTER — LABORATORY RESULT (OUTPATIENT)
Age: 53
End: 2023-01-11

## 2023-01-11 ENCOUNTER — NON-APPOINTMENT (OUTPATIENT)
Age: 53
End: 2023-01-11

## 2023-01-11 ENCOUNTER — APPOINTMENT (OUTPATIENT)
Dept: CARDIOLOGY | Facility: CLINIC | Age: 53
End: 2023-01-11
Payer: MEDICARE

## 2023-01-11 VITALS
WEIGHT: 149 LBS | HEART RATE: 80 BPM | DIASTOLIC BLOOD PRESSURE: 90 MMHG | SYSTOLIC BLOOD PRESSURE: 118 MMHG | OXYGEN SATURATION: 99 % | BODY MASS INDEX: 27.25 KG/M2

## 2023-01-11 PROCEDURE — 99214 OFFICE O/P EST MOD 30 MIN: CPT

## 2023-01-11 PROCEDURE — 93000 ELECTROCARDIOGRAM COMPLETE: CPT

## 2023-01-11 NOTE — REASON FOR VISIT
[FreeTextEntry1] : Dominique Dinh Was seen in follow-up cardiac consultation.  She recently developed some cough fluid retention and puffiness in the face and some shortness of breath.

## 2023-01-11 NOTE — ASSESSMENT
[FreeTextEntry1] : Dominique   Had been quite stable from a cardiac point of view.  Her atrial fibrillation is well controlled, there is no evidence of CHF and she is tolerating her present regimen of medications.  She had no overt evidence of CHF and is asymptomatic\par \par Her last echocardiogram in March 2022 suggested severe tricuspid insufficiency with RV dysfunction and regurgitation into the hepatic veins as well as moderate to severe LV dysfunction which has been present in the past with mild mitral regurgitation.   tricuspid insufficiency is severe\par \par  presently she appears to have evidence of fluid retention some shortness of breath facial puffiness possibly related to worsening tricuspid insufficiency.\par

## 2023-01-11 NOTE — HISTORY OF PRESENT ILLNESS
[FreeTextEntry1] :   Dominique is well-known to me.  She is 52 years old with congenital heart disease status postrepair, history of viral myocarditis in her late 20s with LV dysfunction and status post mitral valve replacement and tricuspid valve replacement for severe congestive heart failure severe MR severe TR and worsening LV function.  This last surgery was done in 2015 at [facility]\par \par  the patient has done extremely well since then functioning at a very good level working exercising and living fairly independently in her own home with a lot of supervision from her services\par \par  her last echocardiogram had revealed worsening tricuspid insufficiency with moderate LV dysfunction and mild to moderate mitral regurgitation.  She was relatively asymptomatic and we discussed referral for possible intervention on the tricuspid valve to a valve center structural heart\par \par   Over the last 1 to 2 weeks the family has noted that her face is more puffy and she is more short of breath though able to do all activities including working yoga and dance.  She is also developed edema of her lower extremities and recently a dry cough.\par \par   Medications include\par  alendronate amoxicillin premed\par  Toprol 100 spironolactone 25 Coumadin 10 mg 6 days as directed Coumadin 7.5 mg 1 day a week ramipril 10 mils

## 2023-01-11 NOTE — PHYSICAL EXAM
[Well Developed] : well developed [Well Nourished] : well nourished [Elevated JVD ____cm] : elevated JVD ~Vcm [Normal S1, S2] : normal S1, S2 [Soft] : abdomen soft [Non Tender] : non-tender [Alert and Oriented] : alert and oriented [de-identified] :  overall looks well but has a puffy face and has gained weight since last visit [de-identified] :  moderate neck vein distention with some degree of HJ reflux [de-identified] :  irregularly irregular 2/6 murmur at the apex 2/6 murmur at the left sternal border pulse irregularly irregular [de-identified] :  decreased breath sounds bilateral [de-identified] :  no tenderness over the liver [de-identified] :  1-2+ edema pitting bilaterally which is new

## 2023-01-11 NOTE — DISCUSSION/SUMMARY
[FreeTextEntry1] :   I am referring her to structural heart at [facility] for further evaluation for possible tricuspid valve intervention.\par \par   I will make arrangements for the visit.\par   In the meantime she will start Lasix 20 mg a day and get back to me in a week as to her weight and edema\par \par  bloods were drawn today for CBC complete metabolic profile BNP  thyroid function uric acid.\par \par   I discussed all the above in detail with both parents who are very involved in her care as well as with the patient who is fully aware of the situation

## 2023-01-12 LAB
ALBUMIN SERPL ELPH-MCNC: 4.9 G/DL
ALP BLD-CCNC: 81 U/L
ALT SERPL-CCNC: 16 U/L
ANION GAP SERPL CALC-SCNC: 12 MMOL/L
AST SERPL-CCNC: 23 U/L
BASOPHILS # BLD AUTO: 0.08 K/UL
BASOPHILS NFR BLD AUTO: 1.1 %
BILIRUB SERPL-MCNC: 2.8 MG/DL
BUN SERPL-MCNC: 16 MG/DL
CALCIUM SERPL-MCNC: 9.2 MG/DL
CHLORIDE SERPL-SCNC: 87 MMOL/L
CO2 SERPL-SCNC: 25 MMOL/L
CREAT SERPL-MCNC: 0.67 MG/DL
EGFR: 104 ML/MIN/1.73M2
EOSINOPHIL # BLD AUTO: 0.09 K/UL
EOSINOPHIL NFR BLD AUTO: 1.2 %
GLUCOSE SERPL-MCNC: 116 MG/DL
HCT VFR BLD CALC: 42.6 %
HGB BLD-MCNC: 14.2 G/DL
IMM GRANULOCYTES NFR BLD AUTO: 0.6 %
INR PPP: 2.67 RATIO
IRON SATN MFR SERPL: 28 %
IRON SERPL-MCNC: 121 UG/DL
LYMPHOCYTES # BLD AUTO: 0.86 K/UL
LYMPHOCYTES NFR BLD AUTO: 11.9 %
MAN DIFF?: NORMAL
MCHC RBC-ENTMCNC: 33.3 GM/DL
MCHC RBC-ENTMCNC: 33.6 PG
MCV RBC AUTO: 100.7 FL
MONOCYTES # BLD AUTO: 0.71 K/UL
MONOCYTES NFR BLD AUTO: 9.8 %
NEUTROPHILS # BLD AUTO: 5.47 K/UL
NEUTROPHILS NFR BLD AUTO: 75.4 %
NT-PROBNP SERPL-MCNC: 2813 PG/ML
PLATELET # BLD AUTO: 144 K/UL
POTASSIUM SERPL-SCNC: 4.8 MMOL/L
PROT SERPL-MCNC: 6.9 G/DL
PT BLD: 31.3 SEC
RBC # BLD: 4.23 M/UL
RBC # FLD: 14.5 %
SODIUM SERPL-SCNC: 125 MMOL/L
TIBC SERPL-MCNC: 426 UG/DL
TSH SERPL-ACNC: 17.6 UIU/ML
UIBC SERPL-MCNC: 304 UG/DL
URATE SERPL-MCNC: 3.4 MG/DL
WBC # FLD AUTO: 7.25 K/UL

## 2023-01-13 ENCOUNTER — APPOINTMENT (OUTPATIENT)
Dept: RADIOLOGY | Facility: CLINIC | Age: 53
End: 2023-01-13
Payer: MEDICARE

## 2023-01-13 ENCOUNTER — OUTPATIENT (OUTPATIENT)
Dept: OUTPATIENT SERVICES | Facility: HOSPITAL | Age: 53
LOS: 1 days | End: 2023-01-13
Payer: MEDICARE

## 2023-01-13 DIAGNOSIS — Z95.3 PRESENCE OF XENOGENIC HEART VALVE: ICD-10-CM

## 2023-01-13 DIAGNOSIS — Z95.4 PRESENCE OF OTHER HEART-VALVE REPLACEMENT: ICD-10-CM

## 2023-01-13 DIAGNOSIS — I07.1 RHEUMATIC TRICUSPID INSUFFICIENCY: ICD-10-CM

## 2023-01-13 DIAGNOSIS — Z79.01 LONG TERM (CURRENT) USE OF ANTICOAGULANTS: ICD-10-CM

## 2023-01-13 DIAGNOSIS — I48.91 UNSPECIFIED ATRIAL FIBRILLATION: ICD-10-CM

## 2023-01-13 PROCEDURE — 71046 X-RAY EXAM CHEST 2 VIEWS: CPT

## 2023-01-13 PROCEDURE — 71046 X-RAY EXAM CHEST 2 VIEWS: CPT | Mod: 26

## 2023-01-17 LAB
ANION GAP SERPL CALC-SCNC: 11 MMOL/L
BUN SERPL-MCNC: 14 MG/DL
CALCIUM SERPL-MCNC: 9.5 MG/DL
CHLORIDE SERPL-SCNC: 90 MMOL/L
CO2 SERPL-SCNC: 29 MMOL/L
CREAT SERPL-MCNC: 0.79 MG/DL
EGFR: 89 ML/MIN/1.73M2
GLUCOSE SERPL-MCNC: 93 MG/DL
POTASSIUM SERPL-SCNC: 4.7 MMOL/L
SODIUM SERPL-SCNC: 130 MMOL/L

## 2023-01-24 ENCOUNTER — LABORATORY RESULT (OUTPATIENT)
Age: 53
End: 2023-01-24

## 2023-01-25 LAB
ANION GAP SERPL CALC-SCNC: 14 MMOL/L
BUN SERPL-MCNC: 18 MG/DL
CALCIUM SERPL-MCNC: 10 MG/DL
CHLORIDE SERPL-SCNC: 94 MMOL/L
CO2 SERPL-SCNC: 26 MMOL/L
CREAT SERPL-MCNC: 0.77 MG/DL
EGFR: 92 ML/MIN/1.73M2
GLUCOSE SERPL-MCNC: 98 MG/DL
NT-PROBNP SERPL-MCNC: 2210 PG/ML
POTASSIUM SERPL-SCNC: 4.7 MMOL/L
SODIUM SERPL-SCNC: 134 MMOL/L
TSH SERPL-ACNC: 12.5 UIU/ML

## 2023-01-26 ENCOUNTER — RX RENEWAL (OUTPATIENT)
Age: 53
End: 2023-01-26

## 2023-01-30 ENCOUNTER — OUTPATIENT (OUTPATIENT)
Dept: OUTPATIENT SERVICES | Facility: HOSPITAL | Age: 53
LOS: 1 days | End: 2023-01-30
Payer: MEDICARE

## 2023-01-30 ENCOUNTER — APPOINTMENT (OUTPATIENT)
Dept: CARDIOTHORACIC SURGERY | Facility: CLINIC | Age: 53
End: 2023-01-30
Payer: MEDICARE

## 2023-01-30 VITALS
TEMPERATURE: 98.2 F | SYSTOLIC BLOOD PRESSURE: 121 MMHG | HEART RATE: 64 BPM | OXYGEN SATURATION: 98 % | DIASTOLIC BLOOD PRESSURE: 88 MMHG

## 2023-01-30 DIAGNOSIS — I35.0 NONRHEUMATIC AORTIC (VALVE) STENOSIS: ICD-10-CM

## 2023-01-30 PROCEDURE — 76377 3D RENDER W/INTRP POSTPROCES: CPT | Mod: 26

## 2023-01-30 PROCEDURE — 93306 TTE W/DOPPLER COMPLETE: CPT | Mod: 26

## 2023-01-30 PROCEDURE — 93306 TTE W/DOPPLER COMPLETE: CPT

## 2023-01-30 PROCEDURE — 76377 3D RENDER W/INTRP POSTPROCES: CPT

## 2023-01-30 PROCEDURE — 99205 OFFICE O/P NEW HI 60 MIN: CPT

## 2023-01-30 RX ORDER — AMOXICILLIN AND CLAVULANATE POTASSIUM 875; 125 MG/1; MG/1
875-125 TABLET, COATED ORAL
Qty: 14 | Refills: 0 | Status: COMPLETED | COMMUNITY
Start: 2020-05-05 | End: 2023-01-30

## 2023-01-30 RX ORDER — SPIRONOLACTONE 25 MG/1
25 TABLET ORAL
Qty: 30 | Refills: 0 | Status: COMPLETED | COMMUNITY
Start: 2022-12-14 | End: 2023-01-30

## 2023-01-30 RX ORDER — METOPROLOL SUCCINATE 100 MG/1
100 TABLET, EXTENDED RELEASE ORAL
Qty: 30 | Refills: 0 | Status: COMPLETED | COMMUNITY
Start: 2018-01-07 | End: 2023-01-30

## 2023-01-30 RX ORDER — GUAIFENESIN 600 MG/1
600 TABLET, EXTENDED RELEASE ORAL
Qty: 20 | Refills: 0 | Status: COMPLETED | COMMUNITY
Start: 2022-06-01 | End: 2023-01-30

## 2023-01-30 RX ORDER — AMOXICILLIN AND CLAVULANATE POTASSIUM 875; 125 MG/1; MG/1
875-125 TABLET, COATED ORAL
Qty: 20 | Refills: 0 | Status: COMPLETED | COMMUNITY
Start: 2022-06-01 | End: 2023-01-30

## 2023-01-30 RX ORDER — FUROSEMIDE 20 MG/1
20 TABLET ORAL
Qty: 60 | Refills: 3 | Status: COMPLETED | COMMUNITY
Start: 2023-01-11 | End: 2023-01-30

## 2023-01-30 NOTE — PHYSICAL EXAM
[Normal Rate] : normal [Irregularly Irregular] : irregularly irregular [Normal S1] : normal S1 [Normal S2] : normal S2 [II] : a grade 2 [___ +] : bilateral [unfilled]U+ pitting edema to the ankles [2+] : left 2+ [Normal] : normal gait [Moves all extremities] : moves all extremities [No Focal Deficits] : no focal deficits [Alert and Oriented] : alert and oriented

## 2023-02-16 ENCOUNTER — LABORATORY RESULT (OUTPATIENT)
Age: 53
End: 2023-02-16

## 2023-02-16 LAB
ANION GAP SERPL CALC-SCNC: 13 MMOL/L
BUN SERPL-MCNC: 15 MG/DL
CALCIUM SERPL-MCNC: 9.8 MG/DL
CHLORIDE SERPL-SCNC: 95 MMOL/L
CO2 SERPL-SCNC: 27 MMOL/L
CREAT SERPL-MCNC: 0.75 MG/DL
EGFR: 95 ML/MIN/1.73M2
GLUCOSE SERPL-MCNC: 89 MG/DL
INR PPP: 5.24 RATIO
POTASSIUM SERPL-SCNC: 4.3 MMOL/L
PT BLD: 62.4 SEC
SODIUM SERPL-SCNC: 135 MMOL/L
TSH SERPL-ACNC: 13.1 UIU/ML

## 2023-02-19 ENCOUNTER — RX RENEWAL (OUTPATIENT)
Age: 53
End: 2023-02-19

## 2023-02-20 LAB
INR PPP: 1.7 RATIO
PT BLD: 20 SEC

## 2023-02-20 NOTE — HISTORY OF PRESENT ILLNESS
[FreeTextEntry1] : Ms Moran is a 52 year old female referred by Dr ELENITA Gale for evaluation of complex valvular heart disease with severe TR and moderate MR. \par \par Based on our recent TTE, she appears to have a bioprosthetic TVR with Grade 5 TR and a mitral annuloplasty ring with at least moderate MR. She has complex congenital heart disease and 2 prior cardiac surgeries. She originally had an ASD, VSD, pulmonic stenosis, and partial anomalous pulmonary venous return--which were surgically repaired at age 4 by Dr Devang Watson at [facility] (ASD and VSD closure, pulmonary valvotomy, and reattachment of the LUPV from the innominate vein to the LA appendage). Her past medical history is also significant for AFIB and Graves disease.\par \par She subsequently underwent MVR (St Nico Epic Tissue Heart Valve T036-37P-91) and TV repair (30mm Model 5200 Annuloplasty Ring) at [facility] on 2/11/2015, and provides the device cards documenting such (see Misc Correspondence 1/20/23). That said, and based on her most recent TTE here, which I have reviewed with Dr Dayana Gale in great detail, she appears to have a TV replacement and an MV annuloplasty repair (we do not have the OP report from [place]'s 2015 surgery). \par \par She is accompanied today by her mother who provides additional history. Though she was developmentally delayed, she is a very active and independent woman. She works at the [employer] 3 days a week for 30 minutes a day, does yoga (currently on ZOOM), and does a 30 minute "hip hop" dance class twice a week. Though she says she feels well and has no complaints, her mother has observed her to be fatigued recently and feels she is slowing down. She denies angina or edema. She lives alone and has a visiting nurse who assists her 20 hours a week.   \par \par Echocardiogram was done today which demonstrates what appears to be a MV repair (with at lease moderate MR), a TV replacement (with torrential, Grade 5 TR), severe RVE with moderately reduced function, and moderate LV dilatation with moderate to severe LV dysfunction (EF approximately 30%).\par \par As noted above, she has 2 valve implant cards--one is a 30mm Naik 5200 annuloplasty ring stated to be in the tricuspid position and the other is a St Nico EPIC bioprosthetic valve (model [id number]-00); the latter does not indicate in which position.

## 2023-02-20 NOTE — REASON FOR VISIT
[Structural Heart and Valve Disease] : structural heart and valve disease [FreeTextEntry3] : Dr. JAIDEN Gale

## 2023-02-20 NOTE — DISCUSSION/SUMMARY
[FreeTextEntry1] : Ms Moran has severe TR, moderate MR, and a subtle recent decline in her functional capacity (as observed by her mother, who helped to supplement her complex history). Based on our review of her TTE, there may be a discrepancy between what we are seeing and what is documented on her valve ID cards. As such:\par \par - We will reach out to [facility] to obtain the OP report from 2015 to confirm in which position she has an annuloplasty ring and in which she has a bioprosthetic valve replacement\par - We will schedule a cardiac CTA to further define her cardiac and valve anatomy (which would also be needed to determine if she may be a candidate for a Valve in Valve or Valve in Ring procedure, if either are indicated)\par - I discussed the details, including the potential risks, of Valve in Valve and Valve in Ring procedures\par - Continue Lasix daily at this time\par --I will notify Dr ELENITA Gale of our impressions and recommendations, which were reviewed with her and her mother ([place]) in great detail\par --We will follow up with her after completion of the CT to discuss our impressions and next steps

## 2023-02-23 ENCOUNTER — APPOINTMENT (OUTPATIENT)
Dept: CARDIOLOGY | Facility: CLINIC | Age: 53
End: 2023-02-23

## 2023-02-23 ENCOUNTER — OUTPATIENT (OUTPATIENT)
Dept: OUTPATIENT SERVICES | Facility: HOSPITAL | Age: 53
LOS: 1 days | End: 2023-02-23
Payer: MEDICARE

## 2023-02-23 ENCOUNTER — RESULT REVIEW (OUTPATIENT)
Age: 53
End: 2023-02-23

## 2023-02-23 DIAGNOSIS — I07.1 RHEUMATIC TRICUSPID INSUFFICIENCY: ICD-10-CM

## 2023-02-23 DIAGNOSIS — Z00.00 ENCOUNTER FOR GENERAL ADULT MEDICAL EXAMINATION WITHOUT ABNORMAL FINDINGS: ICD-10-CM

## 2023-02-23 PROCEDURE — G1004: CPT

## 2023-02-23 PROCEDURE — 75572 CT HRT W/3D IMAGE: CPT | Mod: ME

## 2023-02-23 PROCEDURE — 75572 CT HRT W/3D IMAGE: CPT | Mod: 26,ME

## 2023-03-02 ENCOUNTER — LABORATORY RESULT (OUTPATIENT)
Age: 53
End: 2023-03-02

## 2023-03-10 ENCOUNTER — NON-APPOINTMENT (OUTPATIENT)
Age: 53
End: 2023-03-10

## 2023-03-10 ENCOUNTER — APPOINTMENT (OUTPATIENT)
Dept: CARDIOLOGY | Facility: CLINIC | Age: 53
End: 2023-03-10
Payer: MEDICARE

## 2023-03-10 VITALS
SYSTOLIC BLOOD PRESSURE: 118 MMHG | BODY MASS INDEX: 29.81 KG/M2 | WEIGHT: 162 LBS | HEART RATE: 79 BPM | OXYGEN SATURATION: 97 % | HEIGHT: 62 IN | DIASTOLIC BLOOD PRESSURE: 85 MMHG

## 2023-03-10 DIAGNOSIS — R63.5 ABNORMAL WEIGHT GAIN: ICD-10-CM

## 2023-03-10 PROCEDURE — 99214 OFFICE O/P EST MOD 30 MIN: CPT

## 2023-03-10 PROCEDURE — 93000 ELECTROCARDIOGRAM COMPLETE: CPT

## 2023-03-10 NOTE — REASON FOR VISIT
[FreeTextEntry1] : Dominique Dinh 83 years old here for follow-up of her cardiac status.  The patient recently has gained 10 pounds, does have severe tricuspid insufficiency and is being evaluated for a valve in valve.  She also had an abnormal at CT suggesting possible lesion in the kidney and is scheduled for an MRI

## 2023-03-10 NOTE — ASSESSMENT
[FreeTextEntry1] :  Dominique Mejia  has congenital heart disease cardiomyopathy status post tricuspid valve replacement mitral annuloplasty now with severe tricuspid insufficiency being evaluated for valve in valve.  She recently has developed weight gain facial puffiness and some edema of her lower extremities with some shortness of breath.  She also has a renal lesion which needs further evaluation with an MRI.  Her vital signs are stable and her oxygen saturation normal\par \par   There has been a change in his status with weight gain some increased shortness of breath edema and facial puffiness probably related to the tricuspid insufficiency. she is also hypothyroid on Synthroid replacement followed by Dr. Coello\par \par  1.  Severe tricuspid insufficiency with weight gain edema facial puffiness\par  2.  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  Atrial fibrillation ventricular rate controlled on Coumadin\par  4.  Edema facial puffiness weight gain secondary most likely to tricuspid insufficiency\par  5.  Hypothyroidism on Synthroid replacement\par \par  6.  Abnormal CT with questionable lesion in the kidney for MRI\par \par  patient has developed weight gain some increased shortness of breath facial puffiness which requires further diuresis

## 2023-03-10 NOTE — HISTORY OF PRESENT ILLNESS
[FreeTextEntry1] :  Dominique has known chronic atrial fibrillation, congenital heart disease, history of cardiomyopathy since age 27 probably viral, status post tricuspid valve replacement and mitral annuloplasty in 2015 at [facility]\par .\par   Recently she has developed some increased shortness of breath and fluid retention.  She has severe tricuspid insufficiency and is being evaluated for a valve in valve which she is a candidate in the tricuspid position.  She has severe LV dysfunction on echo and mild to moderate mitral regurgitation.\par \par   Recently she has developed weight gain facial puffiness and puffiness in her lower extremities.  She is probably a little bit short of breath.  We had started Lasix 20 every other day but she developed some degree of hyponatremia.\par \par

## 2023-03-10 NOTE — DISCUSSION/SUMMARY
[FreeTextEntry1] : \par 1.  Continue present regimen of medications and continue the Coumadin to maintain INR between 2 and 2.5\par  2.  Lasix 40 mg a day for the next 3 days and then 20 mg once a day\par  3.  Repeat blood work in 1 week for INR complete metabolic profile\par  4.  MRI March 13, 2023.  I gave the patient a prescription for Xanax 0.25 to take 1 hour prior to the MRI\par  5.  Follow-up again with me in 2-3  weeks\par  [EKG obtained to assist in diagnosis and management of assessed problem(s)] : EKG obtained to assist in diagnosis and management of assessed problem(s)

## 2023-03-10 NOTE — PHYSICAL EXAM
[Well Developed] : well developed [Well Nourished] : well nourished [Normal Conjunctiva] : normal conjunctiva [Normal S1, S2] : normal S1, S2 [Clear Lung Fields] : clear lung fields [Soft] : abdomen soft [Non Tender] : non-tender [No Edema] : no edema [de-identified] :   She overall looks well in no acute distress somewhat anxious she has puffiness in her face [de-identified] :  veins difficult to [de-identified] :  2/6 murmur at the apex and left sternal border no S3 irregularly irregular pulse [de-identified] : Slight varicosities of the lower extremities no redness pulses 2+ dorsalis pedis there is puffiness in both lower extremities with trace pitting edema [Normal Appearance] : normal appearance [General Appearance - Well Nourished] : well nourished [General Appearance - In No Acute Distress] : no acute distress [Respiration, Rhythm And Depth] : normal respiratory rhythm and effort [Auscultation Breath Sounds / Voice Sounds] : lungs were clear to auscultation bilaterally [Heart Sounds] : normal S1 and S2 [Cyanosis, Localized] : no localized cyanosis [FreeTextEntry1] : Trace nonpitting edema.

## 2023-03-13 ENCOUNTER — OUTPATIENT (OUTPATIENT)
Dept: OUTPATIENT SERVICES | Facility: HOSPITAL | Age: 53
LOS: 1 days | End: 2023-03-13
Payer: MEDICARE

## 2023-03-13 ENCOUNTER — APPOINTMENT (OUTPATIENT)
Dept: MRI IMAGING | Facility: CLINIC | Age: 53
End: 2023-03-13
Payer: MEDICARE

## 2023-03-13 DIAGNOSIS — N28.89 OTHER SPECIFIED DISORDERS OF KIDNEY AND URETER: ICD-10-CM

## 2023-03-13 PROCEDURE — 74181 MRI ABDOMEN W/O CONTRAST: CPT | Mod: 26,MH

## 2023-03-13 PROCEDURE — 74181 MRI ABDOMEN W/O CONTRAST: CPT

## 2023-03-21 ENCOUNTER — LABORATORY RESULT (OUTPATIENT)
Age: 53
End: 2023-03-21

## 2023-03-21 LAB
ALBUMIN SERPL ELPH-MCNC: 4.8 G/DL
ALP BLD-CCNC: 80 U/L
ALT SERPL-CCNC: 17 U/L
ANION GAP SERPL CALC-SCNC: 15 MMOL/L
AST SERPL-CCNC: 25 U/L
BASOPHILS # BLD AUTO: 0.06 K/UL
BASOPHILS NFR BLD AUTO: 1.2 %
BILIRUB SERPL-MCNC: 3.4 MG/DL
BUN SERPL-MCNC: 16 MG/DL
CALCIUM SERPL-MCNC: 9.5 MG/DL
CHLORIDE SERPL-SCNC: 97 MMOL/L
CO2 SERPL-SCNC: 25 MMOL/L
CREAT SERPL-MCNC: 0.8 MG/DL
EGFR: 88 ML/MIN/1.73M2
EOSINOPHIL # BLD AUTO: 0.07 K/UL
EOSINOPHIL NFR BLD AUTO: 1.3 %
GLUCOSE SERPL-MCNC: 99 MG/DL
HCT VFR BLD CALC: 41.9 %
HGB BLD-MCNC: 13.8 G/DL
IMM GRANULOCYTES NFR BLD AUTO: 0.4 %
INR PPP: 3.2 RATIO
LYMPHOCYTES # BLD AUTO: 0.78 K/UL
LYMPHOCYTES NFR BLD AUTO: 15 %
MAN DIFF?: NORMAL
MCHC RBC-ENTMCNC: 32.9 GM/DL
MCHC RBC-ENTMCNC: 33.3 PG
MCV RBC AUTO: 101 FL
MONOCYTES # BLD AUTO: 0.56 K/UL
MONOCYTES NFR BLD AUTO: 10.8 %
NEUTROPHILS # BLD AUTO: 3.7 K/UL
NEUTROPHILS NFR BLD AUTO: 71.3 %
NT-PROBNP SERPL-MCNC: 3121 PG/ML
PLATELET # BLD AUTO: 94 K/UL
POTASSIUM SERPL-SCNC: 4.5 MMOL/L
PROT SERPL-MCNC: 6.8 G/DL
PT BLD: 38.2 SEC
RBC # BLD: 4.15 M/UL
RBC # FLD: 14.6 %
SODIUM SERPL-SCNC: 136 MMOL/L
TSH SERPL-ACNC: 10.3 UIU/ML
WBC # FLD AUTO: 5.19 K/UL

## 2023-04-05 LAB
INR PPP: 2.38 RATIO
PT BLD: 27.8 SEC

## 2023-04-11 ENCOUNTER — APPOINTMENT (OUTPATIENT)
Dept: CARDIOLOGY | Facility: CLINIC | Age: 53
End: 2023-04-11
Payer: MEDICARE

## 2023-04-11 VITALS
DIASTOLIC BLOOD PRESSURE: 75 MMHG | HEART RATE: 78 BPM | WEIGHT: 164 LBS | OXYGEN SATURATION: 100 % | SYSTOLIC BLOOD PRESSURE: 126 MMHG | BODY MASS INDEX: 30 KG/M2

## 2023-04-11 PROCEDURE — 99214 OFFICE O/P EST MOD 30 MIN: CPT

## 2023-04-11 NOTE — REASON FOR VISIT
[FreeTextEntry1] : Dominique Dihn 53 years old here for follow-up of her cardiac status.  she is being evaluated and scheduled for a valve in valve in the tricuspid position for severe tricuspid insufficiency.  She has recently developed increasing shortness of breath edema and weight gain.  Apparently recently she has gained about 10 pounds

## 2023-04-11 NOTE — DISCUSSION/SUMMARY
[FreeTextEntry1] :  1.  The patient is being scheduled for a valve in valve in the tricuspid position.  She will be admitted a few days prior for optimization\par  2.  In the meantime increase Lasix to 40 mg twice a day for 1 week then 40 mg once a day\par Patient is on spironolactone so we will not add potassium replacement\par  repeat blood work in 1 week

## 2023-04-11 NOTE — HISTORY OF PRESENT ILLNESS
[FreeTextEntry1] :  Dominique has known chronic atrial fibrillation, congenital heart disease, history of cardiomyopathy since age 27 probably viral, status post tricuspid valve replacement and mitral annuloplasty in 2015 at [facility]\par .\par   Recently she has developed some increased shortness of breath and fluid retention.  She has severe tricuspid insufficiency and is being evaluated for a valve in valve which she is a candidate in the tricuspid position.  She has severe LV dysfunction on echo and mild to moderate mitral regurgitation.\par \par   Recently she has developed weight gain facial puffiness and puffiness in her lower extremities.  She is probably a little bit short of breath.  We had started Lasix 20 every other day but she developed some degree of hyponatremia.\par \par  we also adjusted  her Coumadin to Coumadin 7.5 mg once a day.\par \par   MRI was performed but was suboptimal to evaluate some lesion in the kidney.\par \par   Visit April 11, 2023: Patient has developed a dry cough and perhaps some shortness of breath.  She has gained almost 15 to 20 pounds.  She has a swollen face and swollen legs.  She denies palpitations chest pain.  She is able to function with her regular activity\par \par \par \par

## 2023-04-11 NOTE — ASSESSMENT
[FreeTextEntry1] :  Dominique Mejia  has congenital heart disease cardiomyopathy status post tricuspid valve replacement mitral annuloplasty now with severe tricuspid insufficiency being evaluated for valve in valve.  She recently has developed weight gain facial puffiness and some edema of her lower extremities with some shortness of breath.  She also has a renal lesion which needs further evaluation with an MRI.  Her vital signs are stable and her oxygen saturation normal\par \par   There has been a change in his status with weight gain some increased shortness of breath edema and facial puffiness probably related to the tricuspid insufficiency. she is also hypothyroid on Synthroid replacement followed by Dr. Coello\par \par  she has now developed increasing edema weight gain and a dry cough compatible with significant right heart failure secondary to severe tricuspid insufficiency.\par \par  1.  Severe tricuspid insufficiency with weight gain edema facial puffiness-  She has had almost a 15 to 20 pound weight gain since the last visit\par  2.  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  Atrial fibrillation ventricular rate controlled on Coumadin\par  4.  Edema facial puffiness weight gain secondary most likely to tricuspid insufficiency\par  5.  Hypothyroidism on Synthroid replacement\par \par  6.  Abnormal CT with questionable lesion in the kidney for MRI\par \par  patient has developed weight gain some increased shortness of breath facial puffiness which requires further diuresis.  She has only been on Lasix 20 mg once a day

## 2023-04-11 NOTE — PHYSICAL EXAM
[Well Developed] : well developed [Well Nourished] : well nourished [Normal Conjunctiva] : normal conjunctiva [Normal S1, S2] : normal S1, S2 [Clear Lung Fields] : clear lung fields [Soft] : abdomen soft [Non Tender] : non-tender [No Edema] : no edema [Normal Appearance] : normal appearance [General Appearance - Well Nourished] : well nourished [General Appearance - In No Acute Distress] : no acute distress [Respiration, Rhythm And Depth] : normal respiratory rhythm and effort [Auscultation Breath Sounds / Voice Sounds] : lungs were clear to auscultation bilaterally [Heart Sounds] : normal S1 and S2 [Cyanosis, Localized] : no localized cyanosis [de-identified] :   She overall looks well in no acute distress somewhat anxious she has puffiness in her face [de-identified] :  veins difficult to [de-identified] :  2/6 murmur at the apex and left sternal border no S3 irregularly irregular pulse [de-identified] : Slight varicosities of the lower extremities no redness pulses 2+ dorsalis pedis there is puffiness in both lower extremities with trace pitting edema [FreeTextEntry1] :  2-3+ pitting edema, swollen face marked neck vein distention

## 2023-04-18 LAB
ALBUMIN SERPL ELPH-MCNC: 5 G/DL
ALP BLD-CCNC: 92 U/L
ALT SERPL-CCNC: 17 U/L
ANION GAP SERPL CALC-SCNC: 15 MMOL/L
AST SERPL-CCNC: 26 U/L
BASOPHILS # BLD AUTO: 0.06 K/UL
BASOPHILS NFR BLD AUTO: 1 %
BILIRUB SERPL-MCNC: 4 MG/DL
BUN SERPL-MCNC: 15 MG/DL
CALCIUM SERPL-MCNC: 9.7 MG/DL
CHLORIDE SERPL-SCNC: 90 MMOL/L
CO2 SERPL-SCNC: 27 MMOL/L
CREAT SERPL-MCNC: 0.75 MG/DL
EGFR: 95 ML/MIN/1.73M2
EOSINOPHIL # BLD AUTO: 0.09 K/UL
EOSINOPHIL NFR BLD AUTO: 1.5 %
GLUCOSE SERPL-MCNC: 90 MG/DL
HCT VFR BLD CALC: 42.8 %
HGB BLD-MCNC: 14.3 G/DL
IMM GRANULOCYTES NFR BLD AUTO: 0.3 %
INR PPP: 2.11 RATIO
LYMPHOCYTES # BLD AUTO: 0.76 K/UL
LYMPHOCYTES NFR BLD AUTO: 13 %
MAN DIFF?: NORMAL
MCHC RBC-ENTMCNC: 33.4 GM/DL
MCHC RBC-ENTMCNC: 34.8 PG
MCV RBC AUTO: 104.1 FL
MONOCYTES # BLD AUTO: 0.61 K/UL
MONOCYTES NFR BLD AUTO: 10.4 %
NEUTROPHILS # BLD AUTO: 4.32 K/UL
NEUTROPHILS NFR BLD AUTO: 73.8 %
PLATELET # BLD AUTO: 93 K/UL
POTASSIUM SERPL-SCNC: 3.9 MMOL/L
PROT SERPL-MCNC: 6.8 G/DL
PT BLD: 24.7 SEC
RBC # BLD: 4.11 M/UL
RBC # FLD: 15.1 %
SODIUM SERPL-SCNC: 131 MMOL/L
WBC # FLD AUTO: 5.86 K/UL

## 2023-05-01 ENCOUNTER — APPOINTMENT (OUTPATIENT)
Dept: CARDIOTHORACIC SURGERY | Facility: CLINIC | Age: 53
End: 2023-05-01
Payer: MEDICARE

## 2023-05-01 PROCEDURE — 99214 OFFICE O/P EST MOD 30 MIN: CPT

## 2023-05-02 NOTE — REASON FOR VISIT
[Structural Heart and Valve Disease] : structural heart and valve disease [FreeTextEntry3] : Dr ELENITA Gale

## 2023-05-02 NOTE — HISTORY OF PRESENT ILLNESS
[FreeTextEntry1] : Ms Moran's parents, her HCP, called to request a follow up today (without their daughter) to discuss in greater detail options for intervention in the setting of her severe TR with right-sided heart failure. They have great concerns about the prospect of another open-heart surgery, which would be her third. As we discussed previously, there are no approved options for catheter-based therapies with a degenerated TVR with severe TR.\par \par To summarize, Dominique's most recent OHS was at [place] in 2015, at which time she had an MV repair (#30 Physio II annuloplasty) and TV replacement (#31 Epic). She now has severe TR with progressive symptoms of right-sided heart failure, as noted above. \par \par Her mom notes she has "a lot of fluid in the body, her face is very puffy" and she was advised to elevate her LE and wear compression stockings. She has also noticed intermittent lip cyanosis (noted by a visiting RN). Her Lasix was recently uptitrated to 40mg BID alternating with 40mg QD. She has a recent 10 pound weight gain. Her Endocrinologist recently changed her Synthroid because "she was not absorbing it" to a different preparation (Tirosint). Her appetite is "no problem" and bathroom habits notable for frequent urination. Her mom notes "she does not seem that uncomfortable at all with this" and she is still working 3 days a week at the [employer] in [place].

## 2023-05-02 NOTE — DISCUSSION/SUMMARY
[FreeTextEntry1] : Dominique's parents present to discuss treatment options with respect to her severe symptomatic tricuspid regurgitation of a degenerated tricuspid valve replacement with progressive signs and symptoms of right-sided heart failure.  Given her complex history and 2 prior open heart surgeries, as we have discussed previously, a third open heart surgery would be of considerable risk.  That said, I have recommended that she meet with my surgical colleague Dr. Blaise Farmer to discuss this as well.  She has an appointment scheduled on May 16 with Dr. Farmer.  She continues to display signs and symptoms of right-sided heart failure requiring escalating doses of Lasix, as noted.  \par \par Given their interest in a catheter-based approach, I explained that there are currently no on-label FDA approved options for such.  I did discuss a transcatheter tricuspid valve in valve using a Bhavana 3 Ultra Resilia valve in the tricuspid position.  She has a 31 mm TVR which may be suitable for a 29 mm Bhavana valve.  I explained the process involved and they would like to consider this if it was anatomically feasible.  I will review her CT scan with Dr. Farmer in this regard.  At this time I am making no adjustments to her medications.  She will return to see Dr. Farmer in 2 weeks and we will have a further discussion regarding options for intervention at that time.  I will notify Dr. Gale of our impressions and plan.

## 2023-05-04 ENCOUNTER — LABORATORY RESULT (OUTPATIENT)
Age: 53
End: 2023-05-04

## 2023-05-09 ENCOUNTER — APPOINTMENT (OUTPATIENT)
Dept: FAMILY MEDICINE | Facility: CLINIC | Age: 53
End: 2023-05-09
Payer: MEDICARE

## 2023-05-09 VITALS
SYSTOLIC BLOOD PRESSURE: 125 MMHG | RESPIRATION RATE: 16 BRPM | TEMPERATURE: 97.8 F | DIASTOLIC BLOOD PRESSURE: 80 MMHG | HEART RATE: 77 BPM | OXYGEN SATURATION: 99 %

## 2023-05-09 VITALS — BODY MASS INDEX: 29.45 KG/M2 | WEIGHT: 161 LBS

## 2023-05-09 DIAGNOSIS — Z01.818 ENCOUNTER FOR OTHER PREPROCEDURAL EXAMINATION: ICD-10-CM

## 2023-05-09 PROCEDURE — 99215 OFFICE O/P EST HI 40 MIN: CPT

## 2023-05-12 NOTE — HISTORY OF PRESENT ILLNESS
[Atrial Fibrillation] : atrial fibrillation [No Pertinent Pulmonary History] : no history of asthma, COPD, sleep apnea, or smoking [No Adverse Anesthesia Reaction] : no adverse anesthesia reaction in self or family member [Chronic Anticoagulation] : chronic anticoagulation [Chronic Kidney Disease] : no chronic kidney disease [Diabetes] : no diabetes [FreeTextEntry1] : Tricuspid Valve repair [FreeTextEntry2] : 5/18/2023 [FreeTextEntry3] : Dr Hess [FreeTextEntry4] : 54 y/o F PMHx Downs Syndrome, AVR,TVR,Hypothyroid (Levothyroxine), A-Fib (Coumadin) presents for preop [FreeTextEntry5] : s/p AVR, TVR

## 2023-05-12 NOTE — PHYSICAL EXAM
[No Acute Distress] : no acute distress [Well Nourished] : well nourished [Well Developed] : well developed [Well-Appearing] : well-appearing [Normal Sclera/Conjunctiva] : normal sclera/conjunctiva [PERRL] : pupils equal round and reactive to light [EOMI] : extraocular movements intact [Normal Outer Ear/Nose] : the outer ears and nose were normal in appearance [Normal Oropharynx] : the oropharynx was normal [No JVD] : no jugular venous distention [No Lymphadenopathy] : no lymphadenopathy [Supple] : supple [Thyroid Normal, No Nodules] : the thyroid was normal and there were no nodules present [No Respiratory Distress] : no respiratory distress  [No Accessory Muscle Use] : no accessory muscle use [Clear to Auscultation] : lungs were clear to auscultation bilaterally [Normal Rate] : normal rate  [Normal S1, S2] : normal S1 and S2 [No Carotid Bruits] : no carotid bruits [No Abdominal Bruit] : a ~M bruit was not heard ~T in the abdomen [No Varicosities] : no varicosities [Pedal Pulses Present] : the pedal pulses are present [No Palpable Aorta] : no palpable aorta [No Extremity Clubbing/Cyanosis] : no extremity clubbing/cyanosis [Soft] : abdomen soft [Non Tender] : non-tender [Non-distended] : non-distended [No Masses] : no abdominal mass palpated [No HSM] : no HSM [Normal Bowel Sounds] : normal bowel sounds [Normal Posterior Cervical Nodes] : no posterior cervical lymphadenopathy [Normal Anterior Cervical Nodes] : no anterior cervical lymphadenopathy [No CVA Tenderness] : no CVA  tenderness [No Spinal Tenderness] : no spinal tenderness [No Joint Swelling] : no joint swelling [Grossly Normal Strength/Tone] : grossly normal strength/tone [No Rash] : no rash [Coordination Grossly Intact] : coordination grossly intact [No Focal Deficits] : no focal deficits [Normal Gait] : normal gait [Deep Tendon Reflexes (DTR)] : deep tendon reflexes were 2+ and symmetric [Normal Affect] : the affect was normal [Normal Insight/Judgement] : insight and judgment were intact [de-identified] : facial swelling noted [de-identified] : Shelley Rosa @77 Gr 2/6 M at apex [de-identified] :  1-2 +edema Lower extremities

## 2023-05-16 ENCOUNTER — APPOINTMENT (OUTPATIENT)
Dept: CARDIOTHORACIC SURGERY | Facility: CLINIC | Age: 53
End: 2023-05-16
Payer: MEDICARE

## 2023-05-16 ENCOUNTER — INPATIENT (INPATIENT)
Facility: HOSPITAL | Age: 53
LOS: 5 days | Discharge: NURSING, REHAB OR LONG-TERM CARE | DRG: 266 | End: 2023-05-22
Attending: STUDENT IN AN ORGANIZED HEALTH CARE EDUCATION/TRAINING PROGRAM | Admitting: STUDENT IN AN ORGANIZED HEALTH CARE EDUCATION/TRAINING PROGRAM
Payer: MEDICARE

## 2023-05-16 VITALS
SYSTOLIC BLOOD PRESSURE: 131 MMHG | WEIGHT: 161 LBS | DIASTOLIC BLOOD PRESSURE: 81 MMHG | RESPIRATION RATE: 14 BRPM | BODY MASS INDEX: 29.63 KG/M2 | TEMPERATURE: 97.7 F | HEIGHT: 62 IN | HEART RATE: 75 BPM | OXYGEN SATURATION: 98 %

## 2023-05-16 VITALS
SYSTOLIC BLOOD PRESSURE: 126 MMHG | WEIGHT: 166.67 LBS | HEART RATE: 79 BPM | TEMPERATURE: 98 F | RESPIRATION RATE: 18 BRPM | OXYGEN SATURATION: 92 % | DIASTOLIC BLOOD PRESSURE: 84 MMHG

## 2023-05-16 DIAGNOSIS — E03.9 HYPOTHYROIDISM, UNSPECIFIED: ICD-10-CM

## 2023-05-16 DIAGNOSIS — I50.9 HEART FAILURE, UNSPECIFIED: ICD-10-CM

## 2023-05-16 DIAGNOSIS — I07.1 RHEUMATIC TRICUSPID INSUFFICIENCY: ICD-10-CM

## 2023-05-16 DIAGNOSIS — I48.20 CHRONIC ATRIAL FIBRILLATION, UNSPECIFIED: ICD-10-CM

## 2023-05-16 DIAGNOSIS — Z95.2 PRESENCE OF PROSTHETIC HEART VALVE: Chronic | ICD-10-CM

## 2023-05-16 DIAGNOSIS — Z98.890 OTHER SPECIFIED POSTPROCEDURAL STATES: Chronic | ICD-10-CM

## 2023-05-16 LAB
ALBUMIN SERPL ELPH-MCNC: 4.8 G/DL — SIGNIFICANT CHANGE UP (ref 3.3–5)
ALP SERPL-CCNC: 82 U/L — SIGNIFICANT CHANGE UP (ref 40–120)
ALT FLD-CCNC: 14 U/L — SIGNIFICANT CHANGE UP (ref 10–45)
ANION GAP SERPL CALC-SCNC: 14 MMOL/L — SIGNIFICANT CHANGE UP (ref 5–17)
APPEARANCE UR: CLEAR — SIGNIFICANT CHANGE UP
APTT BLD: 44 SEC — HIGH (ref 27.5–35.5)
AST SERPL-CCNC: 24 U/L — SIGNIFICANT CHANGE UP (ref 10–40)
BACTERIA # UR AUTO: NEGATIVE — SIGNIFICANT CHANGE UP
BASOPHILS # BLD AUTO: 0.05 K/UL — SIGNIFICANT CHANGE UP (ref 0–0.2)
BASOPHILS NFR BLD AUTO: 0.9 % — SIGNIFICANT CHANGE UP (ref 0–2)
BILIRUB SERPL-MCNC: 3.7 MG/DL — HIGH (ref 0.2–1.2)
BILIRUB UR-MCNC: NEGATIVE — SIGNIFICANT CHANGE UP
BLD GP AB SCN SERPL QL: NEGATIVE — SIGNIFICANT CHANGE UP
BUN SERPL-MCNC: 23 MG/DL — SIGNIFICANT CHANGE UP (ref 7–23)
CALCIUM SERPL-MCNC: 9.4 MG/DL — SIGNIFICANT CHANGE UP (ref 8.4–10.5)
CHLORIDE SERPL-SCNC: 92 MMOL/L — LOW (ref 96–108)
CO2 SERPL-SCNC: 27 MMOL/L — SIGNIFICANT CHANGE UP (ref 22–31)
COLOR SPEC: YELLOW — SIGNIFICANT CHANGE UP
CREAT SERPL-MCNC: 0.81 MG/DL — SIGNIFICANT CHANGE UP (ref 0.5–1.3)
DIFF PNL FLD: NEGATIVE — SIGNIFICANT CHANGE UP
EGFR: 86 ML/MIN/1.73M2 — SIGNIFICANT CHANGE UP
EOSINOPHIL # BLD AUTO: 0.06 K/UL — SIGNIFICANT CHANGE UP (ref 0–0.5)
EOSINOPHIL NFR BLD AUTO: 1.1 % — SIGNIFICANT CHANGE UP (ref 0–6)
EPI CELLS # UR: 1 /HPF — SIGNIFICANT CHANGE UP
FIBRINOGEN PPP-MCNC: 197 MG/DL — LOW (ref 200–445)
GLUCOSE SERPL-MCNC: 121 MG/DL — HIGH (ref 70–99)
GLUCOSE UR QL: NEGATIVE — SIGNIFICANT CHANGE UP
HCG SERPL-ACNC: 4.3 MIU/ML — SIGNIFICANT CHANGE UP
HCT VFR BLD CALC: 39.5 % — SIGNIFICANT CHANGE UP (ref 34.5–45)
HGB BLD-MCNC: 13.6 G/DL — SIGNIFICANT CHANGE UP (ref 11.5–15.5)
HYALINE CASTS # UR AUTO: 2 /LPF — SIGNIFICANT CHANGE UP (ref 0–2)
IMM GRANULOCYTES NFR BLD AUTO: 0.4 % — SIGNIFICANT CHANGE UP (ref 0–0.9)
INR BLD: 2.65 RATIO — HIGH (ref 0.88–1.16)
KETONES UR-MCNC: NEGATIVE — SIGNIFICANT CHANGE UP
LEUKOCYTE ESTERASE UR-ACNC: NEGATIVE — SIGNIFICANT CHANGE UP
LYMPHOCYTES # BLD AUTO: 0.81 K/UL — LOW (ref 1–3.3)
LYMPHOCYTES # BLD AUTO: 15.3 % — SIGNIFICANT CHANGE UP (ref 13–44)
MCHC RBC-ENTMCNC: 33.8 PG — SIGNIFICANT CHANGE UP (ref 27–34)
MCHC RBC-ENTMCNC: 34.4 GM/DL — SIGNIFICANT CHANGE UP (ref 32–36)
MCV RBC AUTO: 98.3 FL — SIGNIFICANT CHANGE UP (ref 80–100)
MONOCYTES # BLD AUTO: 0.53 K/UL — SIGNIFICANT CHANGE UP (ref 0–0.9)
MONOCYTES NFR BLD AUTO: 10 % — SIGNIFICANT CHANGE UP (ref 2–14)
NEUTROPHILS # BLD AUTO: 3.84 K/UL — SIGNIFICANT CHANGE UP (ref 1.8–7.4)
NEUTROPHILS NFR BLD AUTO: 72.3 % — SIGNIFICANT CHANGE UP (ref 43–77)
NITRITE UR-MCNC: NEGATIVE — SIGNIFICANT CHANGE UP
NRBC # BLD: 0 /100 WBCS — SIGNIFICANT CHANGE UP (ref 0–0)
NT-PROBNP SERPL-SCNC: 3439 PG/ML — HIGH (ref 0–300)
PA ADP PRP-ACNC: 223 PRU — SIGNIFICANT CHANGE UP (ref 194–417)
PH UR: 6.5 — SIGNIFICANT CHANGE UP (ref 5–8)
PLATELET # BLD AUTO: 99 K/UL — LOW (ref 150–400)
POTASSIUM SERPL-MCNC: 4 MMOL/L — SIGNIFICANT CHANGE UP (ref 3.5–5.3)
POTASSIUM SERPL-SCNC: 4 MMOL/L — SIGNIFICANT CHANGE UP (ref 3.5–5.3)
PROT SERPL-MCNC: 6.9 G/DL — SIGNIFICANT CHANGE UP (ref 6–8.3)
PROT UR-MCNC: ABNORMAL
PROTHROM AB SERPL-ACNC: 30.8 SEC — HIGH (ref 10.5–13.4)
RBC # BLD: 4.02 M/UL — SIGNIFICANT CHANGE UP (ref 3.8–5.2)
RBC # FLD: 14.6 % — HIGH (ref 10.3–14.5)
RBC CASTS # UR COMP ASSIST: 4 /HPF — SIGNIFICANT CHANGE UP (ref 0–4)
RH IG SCN BLD-IMP: POSITIVE — SIGNIFICANT CHANGE UP
SODIUM SERPL-SCNC: 133 MMOL/L — LOW (ref 135–145)
SP GR SPEC: 1.01 — SIGNIFICANT CHANGE UP (ref 1.01–1.02)
T4 FREE SERPL-MCNC: 1.5 NG/DL — SIGNIFICANT CHANGE UP (ref 0.9–1.8)
TSH SERPL-MCNC: 31 UIU/ML — HIGH (ref 0.27–4.2)
UROBILINOGEN FLD QL: NEGATIVE — SIGNIFICANT CHANGE UP
WBC # BLD: 5.31 K/UL — SIGNIFICANT CHANGE UP (ref 3.8–10.5)
WBC # FLD AUTO: 5.31 K/UL — SIGNIFICANT CHANGE UP (ref 3.8–10.5)
WBC UR QL: 12 /HPF — HIGH (ref 0–5)

## 2023-05-16 PROCEDURE — 99204 OFFICE O/P NEW MOD 45 MIN: CPT

## 2023-05-16 PROCEDURE — 71045 X-RAY EXAM CHEST 1 VIEW: CPT | Mod: 26

## 2023-05-16 PROCEDURE — 99222 1ST HOSP IP/OBS MODERATE 55: CPT | Mod: FS

## 2023-05-16 RX ORDER — CHOLECALCIFEROL (VITAMIN D3) 125 MCG
400 CAPSULE ORAL AT BEDTIME
Refills: 0 | Status: DISCONTINUED | OUTPATIENT
Start: 2023-05-16 | End: 2023-05-19

## 2023-05-16 RX ORDER — BNT162B2 ORIGINAL AND OMICRON BA.4/BA.5 .1125; .1125 MG/2.25ML; MG/2.25ML
0.3 INJECTION, SUSPENSION INTRAMUSCULAR ONCE
Refills: 0 | Status: DISCONTINUED | OUTPATIENT
Start: 2023-05-16 | End: 2023-05-19

## 2023-05-16 RX ORDER — SODIUM CHLORIDE 9 MG/ML
3 INJECTION INTRAMUSCULAR; INTRAVENOUS; SUBCUTANEOUS EVERY 8 HOURS
Refills: 0 | Status: DISCONTINUED | OUTPATIENT
Start: 2023-05-16 | End: 2023-05-19

## 2023-05-16 RX ORDER — FUROSEMIDE 40 MG
40 TABLET ORAL EVERY 12 HOURS
Refills: 0 | Status: DISCONTINUED | OUTPATIENT
Start: 2023-05-16 | End: 2023-05-17

## 2023-05-16 RX ORDER — SPIRONOLACTONE 25 MG/1
25 TABLET, FILM COATED ORAL DAILY
Refills: 0 | Status: DISCONTINUED | OUTPATIENT
Start: 2023-05-17 | End: 2023-05-19

## 2023-05-16 RX ORDER — METOPROLOL TARTRATE 50 MG
100 TABLET ORAL DAILY
Refills: 0 | Status: DISCONTINUED | OUTPATIENT
Start: 2023-05-17 | End: 2023-05-19

## 2023-05-16 RX ORDER — WARFARIN SODIUM 7.5 MG/1
7.5 TABLET ORAL
Qty: 30 | Refills: 3 | Status: COMPLETED | COMMUNITY
Start: 2023-02-19 | End: 2023-05-16

## 2023-05-16 RX ORDER — WARFARIN 10 MG/1
10 TABLET ORAL
Qty: 26 | Refills: 0 | Status: COMPLETED | COMMUNITY
Start: 2020-08-13 | End: 2023-05-16

## 2023-05-16 RX ADMIN — SODIUM CHLORIDE 3 MILLILITER(S): 9 INJECTION INTRAMUSCULAR; INTRAVENOUS; SUBCUTANEOUS at 21:28

## 2023-05-16 RX ADMIN — Medication 400 UNIT(S): at 21:26

## 2023-05-16 RX ADMIN — Medication 40 MILLIGRAM(S): at 17:48

## 2023-05-16 NOTE — PATIENT PROFILE ADULT - FUNCTIONAL ASSESSMENT - BASIC MOBILITY ASSESSMENT TYPE
Hemodialysis treatment planned for 180 minutes using a 2 K+ bath for potassium 5 3 this morning  Fluid goal 3000 ml as tolerated  Treatment plan reviewed with Dr Aidan Martini via [facility]            Post-Dialysis RN Treatment Note    Blood Pressure:  Pre 155/67 mm/Hg  Post 111/55 mmHg   EDW:  TBD   Weight:  Pre 83 4 kg   Post 81 5 kg   Mode of weight measurement:   Bed scale   Volume Removed  2700 ml    Treatment duration 180 minutes    NS given:  none    Treatment shortened:  no   Medications given during Rx:  Epogen 2000 units   Estimated Kt/V:  none   Access type:   RIJ Permacath   Access Issues:   Maintains 400 bfr   Report called to primary nurse:  Verbal          Problem: METABOLIC, FLUID AND ELECTROLYTES - ADULT  Goal: Electrolytes maintained within normal limits  Description: INTERVENTIONS:  - Monitor labs and assess patient for signs and symptoms of electrolyte imbalances  - Administer electrolyte replacement as ordered  - Monitor response to electrolyte replacements, including repeat lab results as appropriate  - Instruct patient on fluid and nutrition as appropriate  Outcome: Progressing  Goal: Fluid balance maintained  Description: INTERVENTIONS:  - Monitor labs   - Monitor I/O and WT  - Instruct patient on fluid and nutrition as appropriate  - Assess for signs & symptoms of volume excess or deficit  Outcome: Progressing Admission

## 2023-05-16 NOTE — H&P ADULT - NSHPREVIEWOFSYSTEMS_GEN_ALL_CORE
General:  +recent 10lb weight gain over last 6weeks.  fatigue, fevers or chills  Skin: no itching, burning, rashes, or lesions   HEENT: no visual changes;  no headache, no vertigo, no recent colds, nasal stuffiness or sore throat   Neck: no pain, stiffness or swollen glands  Respiratory: +SOB, no cough, sputum, wheezing, hemoptysis  Cardiovascular: no chest pain, dyspnea or palpitations  GI: no abdominal or epigastric pain. no heartburn, nausea, vomiting, or hematemesis; no diarrhea or constipation. no melena or hematochezia  : no dysuria, frequency or hematuria  Peripheral Vascular: +BL LE swelling.  no intermittent claudication,  no leg cramps, +varicose veins,   Musculoskeletal: Denies limitations of movement or paralysis,  Neuro: no changes in orientation, memory, insight or judgment, no changes in mood, attention or speech.  no dizziness, vertigo or fainting, numbness, tingling or weakness

## 2023-05-16 NOTE — PATIENT PROFILE ADULT - HAVE YOU HAD COVID IN THE LAST 60 DAYS?
Caller would like to discuss an/a Medication for stop smoking. Writer advised caller of callback within 24-72 hours.    Patient Name: Nir Morrison Jr.  Caller Name: self  Name of Facility: [facility]  Callback Number: [phone]  Best Availability: anytime  Fax Number: na  Additional Info: Patient called and stated the [facility] needs the diagnosis code for the nicotine patches, in order for them to be covered under the insurance. The pharmacy should have sent over the request to the clinic. The patient has not picked up the patches yet. Patient asking if the pharmacy could be contact with the code. Please contact the patient with any additional questions.    Did you confirm the message with the caller?: yes    Thank you,  Nevin Pérez     No

## 2023-05-16 NOTE — ASSESSMENT
[FreeTextEntry1] : I reviewed the cardiac imaging, medical records and reports with patient and discussed the case. \par \par Plan:\par \par - Admit today for IV diuresis and optimiization

## 2023-05-16 NOTE — H&P ADULT - NSHPPHYSICALEXAM_GEN_ALL_CORE
Tele: Afib 60s-70s  General: NAD  HEENT:  NC/AT  Neuro: A&Ox4, gait steady, speech clear, no focal deficits noted  Respiratory: B/L BS CTA, no wheeze, no rhonchi, no crackles noted  Cardiovascular: RRR, normal S1S2, no murmur noted  GI: Abd soft, NT/ND, +BSx4Q   Peripheral Vascular: +2  B/L LE edema, 1+ peripheral pulses, +varicosities  Musculoskeletal: B/L UE and LE 5/5 strength   Psychiatric: Normal mood, normal affect observed  Skin: Normal exam to inspection and palpation. no bleeding, no hematoma.  No rashes wounds or lesions noted on sternum or bilateral groins

## 2023-05-16 NOTE — H&P ADULT - ASSESSMENT
Patient is a 53F allergic to Keflex and Strawberries, PMH Afib on Coumadin (last dose 5/15/23 7.5mg) developmental delay, HTN, Anemia, Graves DX, Hypothyroid, Anemia , MV repair  TV replacement 2015 @ [place] who now presents for increased weight gain over last 6 weeks and shortness of breath in the setting of severe tricuspid regurgitation and right sided heart failure prior to Tricuspid Valve in Valve procedure tentatively planned for Friday of this week.  Currently patient denies chest pain.  +WHITE.  No shortness of breath while laying in bed and is able to speak in full sentences without becoming winded. Endorses  increased weight gain and swelling in her lower extremities.   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  Denies abdominal pain/nausea/vomiting.  No rashes/wounds/lesions on sternum or bilateral groins.        Case discussed with structural heart team. Patient to be admitted to [address] [place] [address] for heart failure/fluid overload optimization prior to Tricuspid Ellie procedure on Friday with Dr. Hess/Marleny Mora    -Will obtain pre-op lab work/imaging/diagnostics   -UA/MRSA PCR  -Continue diuresis /w Lasix 40mg PO BID and Aldactone 25mg QD and titrate up, still with some LE edema but breathing appears comfortable at rest  -Will obtain CXR  -No TTE at this time   -Continue HR/BP control with Toprol 100mg QD  -Hold Coumadin for now, last dose 5/15 (took 7.5mg) will start heparin gtt if INR subtherapeutic

## 2023-05-16 NOTE — H&P ADULT - NSHPSOCIALHISTORY_GEN_ALL_CORE
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

## 2023-05-16 NOTE — H&P ADULT - PROBLEM SELECTOR PLAN 1
Admitted prior to Tricuspid Valve in Valve procedure for worsening TR/right sided heart failure    -Admit to [facility] tele bed under Dr. Farmer's service    -Will obtain pre-op lab work/imaging/diagnostics   -UA/MRSA PCR  -Continue diuresis /w Lasix 40mg PO BID and Aldactone 25mg QD and titrate up, still with some LE edema but breathing appears comfortable at rest  -Will obtain CXR  -No TTE for now   -Continue HR/BP control with Toprol 100mg QD

## 2023-05-16 NOTE — H&P ADULT - NSICDXPASTMEDICALHX_GEN_ALL_CORE_FT
PAST MEDICAL HISTORY:  Anemia     Chronic atrial fibrillation     Hypertension     Hypothyroid     Severe tricuspid regurgitation

## 2023-05-16 NOTE — H&P ADULT - PROBLEM SELECTOR PLAN 2
Hx of atrial fibrillation.  Currently rate control    -Takes Coumadin 7.5mg daily, last dose was 5/15/23 total of 7.5mg   -Will obtain coags upon admission  -Hold heparin prior to Tricuspid Ellie will start heparin gtt for subtherapeutic INR   -Continue Toprol 100mg QD for rate control

## 2023-05-16 NOTE — H&P ADULT - HISTORY OF PRESENT ILLNESS
Patient is a 53F allergic to Keflex and Strawberries, PMH Afib on Coumadin (last dose 5/15/23 7.5mg) developmental delay, HTN, Anemia, Graves DX, Hypothyroid, Anemia , MV repair  TV replacement 2015 @ [place] who now presents for increased weight gain over last 6 weeks and shortness of breath in the setting of severe tricuspid regurgitation and right sided heart failure prior to Tricuspid Valve in Valve procedure tentatively planned for Friday of this week.  Currently patient denies chest pain.  +WHITE.  No shortness of breath while laying in bed and is able to speak in full sentences without becoming 1. Daily Shower  2. Weight yourself daily and notify any weight gain greater than 2-3 pounds in 24 hours.  3. Regular diet - low fat, low cholesterol, no added salt.  4. Cleanse Midsternal incision and leg incision daily while showering with warm water and mild soap, pat dry and maintain open to air.   5. Follow Cardiac Surgery Do's and Don'ts discharge instructions.   6. No driving until cleared by MD.   7. No heavy lifting nothing greater than 5 pounds until cleared by MD.   8. Call / Notify MD any fever greater than 101.0  9. Increase Activity as tolerated.inded. Endorses  increased weight gain and swelling in her lower extremities.   States that she is fairly active and still works 3 days a week at the [employer] in [place].  Denies abdominal pain/nausea/vomiting.  No rashes/wounds/lesions on sternum or bilateral groins.   Patient is a 53F allergic to Keflex and Strawberries, PMH Afib on Coumadin (last dose 5/15/23 7.5mg) developmental delay, HTN, Anemia, Graves DX, Hypothyroid, Anemia , MV repair  TV replacement 2015 @ [place] who now presents for increased weight gain over last 6 weeks and shortness of breath in the setting of severe tricuspid regurgitation and right sided heart failure prior to Tricuspid Valve in Valve procedure tentatively planned for Friday of this week.  Currently patient denies chest pain.  +WHITE.  No shortness of breath while laying in bed and is able to speak in full sentences.

## 2023-05-16 NOTE — HISTORY OF PRESENT ILLNESS
[FreeTextEntry1] : Ms Moran is a 53 year old female initially referred by Dr ELENITA Gale for evaluation of complex valvular heart disease with severe TR and moderate MR. 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 Her past medical history is also significant for AFIB and Graves disease. She underwent her second cardiac surgery in 2015 at [place] when she underwent MVR ([facility] Epic Tissue Heart Valve [id number]) and TV replacement (#31 Epic) at [facility] on 2/11/2015, She was last seen 5/1/2023 by Dr. Hess for severe TR with progressive symptoms of right-sided heart failure on latest TTE.  \par \par She is developmentally delayed and remains a very active and independent woman. She works at the [employer] 3 days a week for 30 minutes a day, does yoga (currently on ZOOM), and does a 30 minute "hip hop" dance class twice a week. He mother (HCP) has observed her to be fatigued recently and feels she is slowing down. She denies angina or edema. She lives alone and has a visiting nurse who assists her 20 hours a week. \par \par Echocardiogram demonstrates what appears to be a MV repair (with at lease moderate MR), a TV replacement (with torrential, Grade 5 TR), severe RVE with moderately reduced function, and moderate LV dilatation with moderate to severe LV dysfunction (EF approximately 30%).\par \par She presents today and reports feeling tired.  She reports feeling a little tired, however her mother reports she has noticed her to be very fatigued and less active recently.  Mom also reports weight gain of 20 lbs over the past few months that is mainly fluid.  Denies CP.  Eating and drinking with +BM. + LE edema.\par \par

## 2023-05-16 NOTE — REVIEW OF SYSTEMS
[Feeling Poorly] : feeling poorly [Feeling Tired] : feeling tired [As Noted in HPI] : as noted in HPI [Shortness Of Breath] : shortness of breath [SOB on Exertion] : shortness of breath during exertion [Negative] : Heme/Lymph

## 2023-05-16 NOTE — PHYSICAL EXAM
[General Appearance - Alert] : alert [General Appearance - In No Acute Distress] : in no acute distress [Sclera] : the sclera and conjunctiva were normal [Neck Appearance] : the appearance of the neck was normal [Jugular Venous Distention Increased] : there was no jugular-venous distention [] : no respiratory distress [Respiration, Rhythm And Depth] : normal respiratory rhythm and effort [Exaggerated Use Of Accessory Muscles For Inspiration] : no accessory muscle use [Auscultation Breath Sounds / Voice Sounds] : lungs were clear to auscultation bilaterally [Apical Impulse] : the apical impulse was normal [Heart Rate And Rhythm] : heart rate was normal and rhythm regular [Heart Sounds] : normal S1 and S2 [Bowel Sounds] : normal bowel sounds [Abdomen Soft] : soft [Abdomen Tenderness] : non-tender [Involuntary Movements] : no involuntary movements were seen [No Focal Deficits] : no focal deficits [Oriented To Time, Place, And Person] : oriented to person, place, and time [Impaired Insight] : insight and judgment were intact [Affect] : the affect was normal [Mood] : the mood was normal

## 2023-05-16 NOTE — H&P ADULT - PROBLEM SELECTOR PLAN 3
Currently on Tirosint 150mcg QD at home as of 3 weeks ago.  Patient to take own supply while inpatient will verify with pharmacy    -Thyroid panel to be sent

## 2023-05-16 NOTE — PATIENT PROFILE ADULT - FUNCTIONAL ASSESSMENT - BASIC MOBILITY SECTION LABEL
08/10/2022  Mena Ovalle is a 27 y.o., female.      Pre-op Assessment    I have reviewed the Patient Summary Reports.     I have reviewed the Nursing Notes. I have reviewed the NPO Status.   I have reviewed the Medications.     Review of Systems  Anesthesia Hx:  Denies Family Hx of Anesthesia complications.   Denies Personal Hx of Anesthesia complications.   Social:  Non-Smoker, No Alcohol Use    Hematology/Oncology:     Oncology Normal    -- Anemia:   EENT/Dental:EENT/Dental Normal   Cardiovascular:  Cardiovascular Normal     Pulmonary:  Pulmonary Normal    Renal/:  Renal/ Normal     Hepatic/GI:   Patient complains of abdominal pain and nausea. Patient had laparoscopic cholecystectomy two days ago.  Has been NPO since then.   Musculoskeletal:  Musculoskeletal Normal    Neurological:  Neurology Normal    Endocrine:  Endocrine Normal    Psych:  Psychiatric Normal           Physical Exam  General: Well nourished, Cooperative, Alert and Oriented  Patient appears sedated.  Airway:  Mallampati: III   Mouth Opening: Normal  TM Distance: > 6 cm  Tongue: Normal  Neck ROM: Normal ROM    Dental:  Intact    Chest/Lungs:  Clear to auscultation, Normal Respiratory Rate    Heart:  Rate: Normal  Rhythm: Regular Rhythm  Sounds: Normal        Anesthesia Plan  Type of Anesthesia, risks & benefits discussed:    Anesthesia Type: Gen ETT  Intra-op Monitoring Plan: Standard ASA Monitors  Post Op Pain Control Plan: multimodal analgesia  Induction:  IV  Airway Plan: , Post-Induction  Informed Consent: Informed consent signed with the Patient and all parties understand the risks and agree with anesthesia plan.  All questions answered.   ASA Score: 3 Emergent  Anesthesia Plan Notes: No acetaminophen  Antiemetics:  Zofran in holding area, Pepcid, Decadron 4 mg    Ready For Surgery From Anesthesia Perspective.     .      
.

## 2023-05-16 NOTE — PATIENT PROFILE ADULT - FUNCTIONAL ASSESSMENT - BASIC MOBILITY 4.
Attending MD Manzo: A & O x 3, NAD, EOMI b/l, PERRL b/l; lungs CTAB, heart with reg rhythm without murmur; abdomen soft NTND; extremities with no edema; affect appropriate. neuro exam non focal with no motor or sensory deficits. peripheral pulses full and equal in bilateral upper and lower extremities, no calf ttp or swelling b/l PHYSICAL EXAM:  GENERAL: NAD, well-groomed, well-developed  HEAD:  Atraumatic, Normocephalic  EYES: EOMI, PERRLA, conjunctiva and sclera clear  ENMT: No tonsillar erythema, exudates, or enlargement; Moist mucous membranes  NECK: Supple   CHEST/LUNG: Clear to auscultation bilaterally; No rales, rhonchi, wheezing, or rubs; no chest tenderness with palpation  HEART: Regular rate and rhythm; No murmurs, rubs, or gallops  ABDOMEN: Soft, Nontender, Nondistended; Bowel sounds present  VASCULAR:  2+ Peripheral Pulses, No clubbing, cyanosis, or edema  LYMPH: No lymphadenopathy noted  SKIN: No rashes or lesions  NERVOUS SYSTEM:  AAOx3, moving all extremities, no focal deficits    Attending MD Manzo: A & O x 3, NAD, EOMI b/l, PERRL b/l; lungs CTAB, heart with reg rhythm without murmur; abdomen soft NTND; extremities with no edema; affect appropriate. neuro exam non focal with no motor or sensory deficits. peripheral pulses full and equal in bilateral upper and lower extremities, no calf ttp or swelling b/l 4 = No assist / stand by assistance

## 2023-05-16 NOTE — END OF VISIT
[FreeTextEntry3] : Written by Neal Clifton NP, acting as a scribe for Dr. Farmer\par “The documentation recorded by the scribe accurately reflects the service I personally performed and the decisions made by me.” Signature Blaise Farmer MD.

## 2023-05-16 NOTE — PATIENT PROFILE ADULT - FUNCTIONAL ASSESSMENT - BASIC MOBILITY 6.
4-calculated by average/Not able to assess (calculate score using [facility] averaging method)

## 2023-05-17 DIAGNOSIS — Z86.39 PERSONAL HISTORY OF OTHER ENDOCRINE, NUTRITIONAL AND METABOLIC DISEASE: ICD-10-CM

## 2023-05-17 DIAGNOSIS — M81.0 AGE-RELATED OSTEOPOROSIS WITHOUT CURRENT PATHOLOGICAL FRACTURE: ICD-10-CM

## 2023-05-17 DIAGNOSIS — E89.0 POSTPROCEDURAL HYPOTHYROIDISM: ICD-10-CM

## 2023-05-17 DIAGNOSIS — R79.89 OTHER SPECIFIED ABNORMAL FINDINGS OF BLOOD CHEMISTRY: ICD-10-CM

## 2023-05-17 LAB
A1C WITH ESTIMATED AVERAGE GLUCOSE RESULT: 6.1 % — HIGH (ref 4–5.6)
ALBUMIN SERPL ELPH-MCNC: 4.3 G/DL — SIGNIFICANT CHANGE UP (ref 3.3–5)
ALP SERPL-CCNC: 68 U/L — SIGNIFICANT CHANGE UP (ref 40–120)
ALT FLD-CCNC: 11 U/L — SIGNIFICANT CHANGE UP (ref 10–45)
ANION GAP SERPL CALC-SCNC: 13 MMOL/L — SIGNIFICANT CHANGE UP (ref 5–17)
APTT BLD: 40.6 SEC — HIGH (ref 27.5–35.5)
AST SERPL-CCNC: 20 U/L — SIGNIFICANT CHANGE UP (ref 10–40)
BASOPHILS # BLD AUTO: 0.05 K/UL — SIGNIFICANT CHANGE UP (ref 0–0.2)
BASOPHILS NFR BLD AUTO: 1.1 % — SIGNIFICANT CHANGE UP (ref 0–2)
BILIRUB SERPL-MCNC: 3.9 MG/DL — HIGH (ref 0.2–1.2)
BUN SERPL-MCNC: 19 MG/DL — SIGNIFICANT CHANGE UP (ref 7–23)
CALCIUM SERPL-MCNC: 8.7 MG/DL — SIGNIFICANT CHANGE UP (ref 8.4–10.5)
CHLORIDE SERPL-SCNC: 94 MMOL/L — LOW (ref 96–108)
CO2 SERPL-SCNC: 25 MMOL/L — SIGNIFICANT CHANGE UP (ref 22–31)
CREAT SERPL-MCNC: 0.66 MG/DL — SIGNIFICANT CHANGE UP (ref 0.5–1.3)
EGFR: 104 ML/MIN/1.73M2 — SIGNIFICANT CHANGE UP
EOSINOPHIL # BLD AUTO: 0.11 K/UL — SIGNIFICANT CHANGE UP (ref 0–0.5)
EOSINOPHIL NFR BLD AUTO: 2.5 % — SIGNIFICANT CHANGE UP (ref 0–6)
ESTIMATED AVERAGE GLUCOSE: 128 MG/DL — HIGH (ref 68–114)
GLUCOSE SERPL-MCNC: 92 MG/DL — SIGNIFICANT CHANGE UP (ref 70–99)
HCT VFR BLD CALC: 37.2 % — SIGNIFICANT CHANGE UP (ref 34.5–45)
HGB BLD-MCNC: 13.1 G/DL — SIGNIFICANT CHANGE UP (ref 11.5–15.5)
IMM GRANULOCYTES NFR BLD AUTO: 0.5 % — SIGNIFICANT CHANGE UP (ref 0–0.9)
INR BLD: 2.5 RATIO — HIGH (ref 0.88–1.16)
LYMPHOCYTES # BLD AUTO: 0.94 K/UL — LOW (ref 1–3.3)
LYMPHOCYTES # BLD AUTO: 21.3 % — SIGNIFICANT CHANGE UP (ref 13–44)
MCHC RBC-ENTMCNC: 34.2 PG — HIGH (ref 27–34)
MCHC RBC-ENTMCNC: 35.2 GM/DL — SIGNIFICANT CHANGE UP (ref 32–36)
MCV RBC AUTO: 97.1 FL — SIGNIFICANT CHANGE UP (ref 80–100)
MONOCYTES # BLD AUTO: 0.59 K/UL — SIGNIFICANT CHANGE UP (ref 0–0.9)
MONOCYTES NFR BLD AUTO: 13.4 % — SIGNIFICANT CHANGE UP (ref 2–14)
MRSA PCR RESULT.: SIGNIFICANT CHANGE UP
NEUTROPHILS # BLD AUTO: 2.7 K/UL — SIGNIFICANT CHANGE UP (ref 1.8–7.4)
NEUTROPHILS NFR BLD AUTO: 61.2 % — SIGNIFICANT CHANGE UP (ref 43–77)
NRBC # BLD: 0 /100 WBCS — SIGNIFICANT CHANGE UP (ref 0–0)
PLATELET # BLD AUTO: 86 K/UL — LOW (ref 150–400)
POTASSIUM SERPL-MCNC: 3.6 MMOL/L — SIGNIFICANT CHANGE UP (ref 3.5–5.3)
POTASSIUM SERPL-SCNC: 3.6 MMOL/L — SIGNIFICANT CHANGE UP (ref 3.5–5.3)
PROT SERPL-MCNC: 6.2 G/DL — SIGNIFICANT CHANGE UP (ref 6–8.3)
PROTHROM AB SERPL-ACNC: 29 SEC — HIGH (ref 10.5–13.4)
RBC # BLD: 3.83 M/UL — SIGNIFICANT CHANGE UP (ref 3.8–5.2)
RBC # FLD: 14.5 % — SIGNIFICANT CHANGE UP (ref 10.3–14.5)
S AUREUS DNA NOSE QL NAA+PROBE: SIGNIFICANT CHANGE UP
SODIUM SERPL-SCNC: 132 MMOL/L — LOW (ref 135–145)
WBC # BLD: 4.41 K/UL — SIGNIFICANT CHANGE UP (ref 3.8–10.5)
WBC # FLD AUTO: 4.41 K/UL — SIGNIFICANT CHANGE UP (ref 3.8–10.5)

## 2023-05-17 PROCEDURE — 99222 1ST HOSP IP/OBS MODERATE 55: CPT

## 2023-05-17 PROCEDURE — 99232 SBSQ HOSP IP/OBS MODERATE 35: CPT | Mod: FS

## 2023-05-17 PROCEDURE — 94010 BREATHING CAPACITY TEST: CPT | Mod: 26

## 2023-05-17 PROCEDURE — 93010 ELECTROCARDIOGRAM REPORT: CPT

## 2023-05-17 RX ORDER — FUROSEMIDE 40 MG
40 TABLET ORAL
Refills: 0 | Status: DISCONTINUED | OUTPATIENT
Start: 2023-05-17 | End: 2023-05-19

## 2023-05-17 RX ORDER — LEVOTHYROXINE SODIUM 125 MCG
112 TABLET ORAL
Refills: 0 | Status: DISCONTINUED | OUTPATIENT
Start: 2023-05-17 | End: 2023-05-19

## 2023-05-17 RX ADMIN — Medication 1 TABLET(S): at 11:45

## 2023-05-17 RX ADMIN — Medication 100 MILLIGRAM(S): at 06:25

## 2023-05-17 RX ADMIN — Medication 112 MICROGRAM(S): at 12:17

## 2023-05-17 RX ADMIN — SODIUM CHLORIDE 3 MILLILITER(S): 9 INJECTION INTRAMUSCULAR; INTRAVENOUS; SUBCUTANEOUS at 21:29

## 2023-05-17 RX ADMIN — SODIUM CHLORIDE 3 MILLILITER(S): 9 INJECTION INTRAMUSCULAR; INTRAVENOUS; SUBCUTANEOUS at 06:25

## 2023-05-17 RX ADMIN — SPIRONOLACTONE 25 MILLIGRAM(S): 25 TABLET, FILM COATED ORAL at 06:22

## 2023-05-17 RX ADMIN — Medication 40 MILLIGRAM(S): at 17:29

## 2023-05-17 RX ADMIN — Medication 40 MILLIGRAM(S): at 06:22

## 2023-05-17 RX ADMIN — SODIUM CHLORIDE 3 MILLILITER(S): 9 INJECTION INTRAMUSCULAR; INTRAVENOUS; SUBCUTANEOUS at 13:21

## 2023-05-17 RX ADMIN — Medication 400 UNIT(S): at 21:19

## 2023-05-17 NOTE — PROGRESS NOTE ADULT - ASSESSMENT
53F PMH Afib on Coumadin (last dose 5/15/23 7.5mg) developmental delay, HTN, Anemia, Graves DX, Hypothyroid, Anemia , MV repair  TV replacement 2015 @ [place] who now presents for increased weight gain over last 6 weeks and shortness of breath in the setting of severe tricuspid regurgitation and right sided heart failure prior to Tricuspid Valve in Valve procedure tentatively planned for Friday of this week.  Currently patient denies chest pain.  +WHITE.  No shortness of breath while laying in bed and is able to speak in full sentences.  5/17 TSH 33 > nml T4  H Endo consulted

## 2023-05-17 NOTE — CONSULT NOTE ADULT - ASSESSMENT
Patient is a 53F allergic to Keflex and Strawberries, PMH Afib on Coumadin (last dose 5/15/23 7.5mg) developmental delay, HTN, Anemia, Graves DX, Hypothyroid, Anemia , MV repair  TV replacement 2015 @ [place] who now presents for increased weight gain over last 6 weeks and shortness of breath in the setting of severe tricuspid regurgitation and right sided heart failure prior to Tricuspid Valve in Valve on Friday 5/19 with Dr. Hess and Dr. Farmer.

## 2023-05-17 NOTE — CONSULT NOTE ADULT - SUBJECTIVE AND OBJECTIVE BOX
Dominique Mejia  is well-known to me.  She has a history of congenital heart disease, status post repair, history of cardiomyopathy viral when she was 29 years old, chronic atrial fibrillation on anticoagulation, status post mitral repair and tricuspid valve replacement in 2015 at [facility].  Patient has developed progressive tricuspid insufficiency with progressive shortness of breath edema fluid retention compatible with right heart failure.  She also has a history of hypothyroidism followed carefully by endocrinology Dr. Coello with elevated TSH recently switched to trioscint  she is now scheduled for tricuspid valve in valve.  Overall she is feeling well and offers no significant complaints of palpitations shortness of breath.    MEDICATIONS:  MEDICATIONS  (STANDING):  cholecalciferol 400 Unit(s) Oral at bedtime  coronavirus bivalent (EUA) Booster Vaccine (PFIZER) 0.3 milliLiter(s) IntraMuscular once  furosemide   Injectable 40 milliGRAM(s) IV Push two times a day  levothyroxine Injectable 112 MICROGram(s) IV Push <User Schedule>  metoprolol succinate  milliGRAM(s) Oral daily  multivitamin 1 Tablet(s) Oral daily  sodium chloride 0.9% lock flush 3 milliLiter(s) IV Push every 8 hours  spironolactone 25 milliGRAM(s) Oral daily      PHYSICAL EXAM:  T(C): 36.9 (05-17-23 @ 11:58), Max: 36.9 (05-17-23 @ 11:58)  HR: 80 (05-17-23 @ 11:58) (74 - 85)  BP: 102/76 (05-17-23 @ 11:58) (102/76 - 114/80)  RR: 18 (05-17-23 @ 11:58) (18 - 18)  SpO2: 97% (05-17-23 @ 11:58) (95% - 97%)  Wt(kg): --  I&O's Summary    16 May 2023 07:01  -  17 May 2023 07:00  --------------------------------------------------------  IN: 240 mL / OUT: 850 mL / NET: -610 mL    17 May 2023 07:01  -  17 May 2023 14:57  --------------------------------------------------------  IN: 440 mL / OUT: 201 mL / NET: 239 mL          Appearance: Normal swollen face awake alert in good spirits offers no complaints	  HEENT:   Normal oral mucosa, PERRL, EOMI	  Cardiovascular: Normal S1 S2,  irregularly  moderate JVD,? HJ reflux  Respiratory: Lungs clear to auscultation, normal effort 	  Gastrointestinal:  Soft, Non-tender, + BS	  Skin: No rashes, No ecchymoses, No cyanosis, warm to touch  Musculoskeletal: Normal range of motion, normal strength  Psychiatry:  Mood & affect appropriate  Ext: Brawny edema of the lower some pitting  Peripheral pulses palpable 2+ bilaterally      LABS:    CARDIAC MARKERS:                                13.1   4.41  )-----------( 86       ( 17 May 2023 06:50 )             37.2     05-17    132<L>  |  94<L>  |  19  ----------------------------<  92  3.6   |  25  |  0.66    Ca    8.7      17 May 2023 06:51    TPro  6.2  /  Alb  4.3  /  TBili  3.9<H>  /  DBili  x   /  AST  20  /  ALT  11  /  AlkPhos  68  05-17    proBNP:   Lipid Profile:   HgA1c:   TSH: Thyroid Stimulating Hormone, Serum: 31.00 uIU/mL (05-16 @ 17:08)    < from: Transthoracic Echocardiogram (01.30.23 @ 09:17) >  CONCLUSIONS:  1. Mitral annuloplasty ring/repair. There is at least  moderate mitral regurgitation by PISA quantitation.  By PISA method, ERO= 22mm2, regurgitant volume= 28ml/beat.  MV VTI PW annulus= 10.3cm  PISA radius= 0.73cm, alias velocity= 28.1cm/s.  MR VTI= 129cm, MR Vmax= 4.3m/s.  2. The left ventricle is moderately dilated.  3. There is moderate-severe global hypokinesis with  flattening and thinning of the interventricular septum.  The left ventricular function is moderate-severely reduced.   The LVEF= 29% by 4D LV analysis.  4. There is both systolic and diastolic septal flattening  consistent with right ventricular pressure/volume overload.  The right ventricile is severely dilated with moderately  reduced function.  FAC= 26%.  Other quantitative parameters not possible.  5. Bioprosthetic tricuspid valve replacement is seen.  There is torrential, Grade5, tricuspid regurgitation.  6. The estimated right atrial pressure is elevated.  ------------------------------------------------------------------------  PROCEDURE DESCRIPTION: Transthoracic echocardiogram with  2-D, M-Mode and complete spectral and color flow Doppler.  Real-time and reconstructed 3-dimensional imaging was  performed with Workstation. Color Doppler analysis was  carried out using both 2D and 3D mapping.  ------------------------------------------------------------------------  ECHOCARDIOGRAPHIC EXAMINATION:  AORTIC ROOT:  Aortic Root (Leaflet): 2.7 cm  Aortic Root Index: 0.8  LEFT ATRIUM:  AP Dimensions: 6.2 cm  LA Volume Index: 130.00 cc/sqm  LA Volume: 216.7 cc  LEFT VENTRICLE:  LVIDd: 6.0 cm  LVIDs: 5.1 cm  Fraction Short: 15 %  IVS: 1.00  ILWT: 1.0 cm  RWT: 0.33  LV Mass: 247.0 gm  LV Mass Index: 148 gm/sqm  EF (3D Quantification): 29%  HR and BP:  HR: 74 bpm  BP: 119/84  BSA: 1.67  TRICUSPID VALVE:  TR Velocity: 1.9 M/s  TR Gradient: 14.4 mm Hg  ------------------------------------------------------------------------  HEMODYNAMICS:  RA Pressure Estimate: 8  RVSP: 22  LA Pressure Estimate: < 20  PAS: 38  IVRT: 92 ms  ------------------------------------------------------------------------  COLOR FLOW and SPECIAL DOPPLER:  AORTIC VALVE:  AV Peak Velocity: 1.1 M/sec  AV Peak Gradient: 5 mm Hg  AV Mean Gradient: 3 mm Hg  Valve Area: 2.2 sqcm  LVOT:  LVOT Velocity: .8 M/sec  LVOT Diameter: 2.0 cm  LVOT Peak Gradient Rest: 2 mm Hg  LVOT Mean Gradient Rest: 1 mm Hg  ------------------------------------------------------------------------  DIASTOLIC FUNCTION:  DT:271 ms  MV E wave: 1.6 m/s  ------------------------------------------------------------------------  Confirmed on  1/30/2023 - 12:08:37 by Dayana Gale M.D.  ------------------------------------------------------------------------    < from: CT Heart without Coronaries w/ IV Cont (02.23.23 @ 09:41) >  IMPRESSION:    Cardiomegaly, particularly the right ventricle, right atrium, and left   atrium. Postsurgical changes of the left atrium related to prior repair   of anomalous pulmonary venous return. Postsurgical changes along the   interatrial septum and interventricular septum. Mitral and tricuspid   valve prostheses identified. Correlate with surgical history.    Hypervascular 17mm left renal mass image 94 series 20. This is concerning   for renal cell neoplasm until proven otherwise. Indeterminate 15 mm left   adrenal nodule. Slight thickening of the right adrenal gland. MRI abdomen   is recommended for further evaluation. Dr. Cha discussed these   findings with OMER Tabares on 2/23/2023 at 11:05 AM, with read back.    Visualized groundglass of the lung parenchyma may reflect pulmonary   congestion. Small ascites. Anterior omental fat stranding/apparent   infiltration of unclear significance.    Severe stenosis of the imaged proximal left superficial femoral artery.   Prominent bilateral proximal thigh and inguinal superficial venous   varicosities.      ANGELA CHA M.D., ATTENDING RADIOLOGIST  This document has been electronically signed. Feb 23 2023 11:16AM              TELEMETRY: 	    ECG:  	  RADIOLOGY:   DIAGNOSTIC TESTING:  [ ] Echocardiogram:  [ ]  Catheterization:  [ ] Stress Test:    OTHER:

## 2023-05-17 NOTE — CONSULT NOTE ADULT - PROBLEM SELECTOR RECOMMENDATION 2
TSH 31, FT4 normal  ENT consult noted, recommended switching levothyroxine to IV LT4 while inpatient given chf/elevated tsh to bypass any absorption issues  Noted that it could take a couple of weeks to see TSH normalize    ELENITA Cheek [id number] TSH 31, FT4 normal  Endo consult noted, recommended switching levothyroxine to IV LT4 while inpatient given chf/elevated tsh to bypass any absorption issues  Noted that it could take a couple of weeks to see TSH normalize  Repeat Labs Friday    ELENITA Cheek [id number]

## 2023-05-17 NOTE — CONSULT NOTE ADULT - ASSESSMENT
Dominique Mejia   Has severe tricuspid insufficiency with signs and symptoms of right heart failure but is hemodynamically stable at the present time.  She has a complicating feature of hypothyroidism despite being on Synthroid replacement with an elevated TSH though clinically she looks well and is stable.  Endocrine consult appreciated     for now I recommend IV diuretics instead of p.o. for better weight reduction in preparation for the tricuspid valve in valve.  Hopefully if endocrine gives the okay and anesthesia gives the okay, to proceed for the valve in valve at the scheduled day this Friday.      Electrolytes should be monitored daily  .    At discharge we can consider switching her from ACE inhibitor to Entresto though she has done well on her present medications     Alden Gale MD

## 2023-05-17 NOTE — CONSULT NOTE ADULT - PROBLEM SELECTOR RECOMMENDATION 9
Acute on chronic CHF  Currently on PO diuresis, strict I&O, daily weights, daily CMP  Pt. is scheduled for tricuspid Ellie on Friday, however there are concerns that her TSH is elevated which is a contraindication to general anesthesia.   Will discuss with Dr. Farmer and Deshawn pending ENT evaluation, procedure may need to be postponed. Acute on chronic CHF  Currently on PO diuresis, will change to Furosemide 40mg IV BID  I&O, daily weights, daily CMP  Pt. is scheduled for tricuspid Ellie on Friday, however there are concerns that her TSH is elevated which is a contraindication to general anesthesia.   Will discuss with Dr. Farmer and Deshawn pending Endo evaluation, procedure may need to be postponed.

## 2023-05-17 NOTE — CONSULT NOTE ADULT - SUBJECTIVE AND OBJECTIVE BOX
HPI:  Patient is a 53F allergic to Keflex and Strawberries, PMH Afib on Coumadin (last dose 5/15/23 7.5mg) developmental delay, HTN, Anemia, Graves DX, Hypothyroid, Anemia , MV repair  TV replacement 2015 @ [place] who now presents for increased weight gain over last 6 weeks and shortness of breath in the setting of severe tricuspid regurgitation and right sided heart failure prior to Tricuspid Valve in Valve procedure tentatively planned for Friday of this week.  Currently patient denies chest pain.  +WHITE.  No shortness of breath while laying in bed and is able to speak in full sentences. (16 May 2023 16:14)      PAST MEDICAL & SURGICAL HISTORY:  Chronic atrial fibrillation      Hypothyroid      Hypertension      Severe tricuspid regurgitation      Anemia      S/P mitral valve replacement      H/O tricuspid valve repair          REVIEW OF SYSTEMS:    CONSTITUTIONAL: No weakness, fevers or chills, (+) fatigue  EYES/ENT: No visual changes;  No vertigo or throat pain  NECK: No pain or stiffness  RESPIRATORY: No cough, wheezing, hemoptysis; No shortness of breath at rest  CARDIOVASCULAR: No chest pain or palpitations, PND, or orthopnea, (+) exertional dyspnea, (+) pedal edema  GASTROINTESTINAL: No abdominal or epigastric pain. No nausea, vomiting, or hematemesis; No diarrhea or constipation. No melena or hematochezia.  GENITOURINARY: No dysuria, frequency or hematuria  NEUROLOGICAL: No numbness or weakness  SKIN: No itching, rashes      MEDICATIONS  (STANDING):  cholecalciferol 400 Unit(s) Oral at bedtime  coronavirus bivalent (EUA) Booster Vaccine (PFIZER) 0.3 milliLiter(s) IntraMuscular once  furosemide    Tablet 40 milliGRAM(s) Oral every 12 hours  levothyroxine Injectable 112 MICROGram(s) IV Push <User Schedule>  metoprolol succinate  milliGRAM(s) Oral daily  multivitamin 1 Tablet(s) Oral daily  sodium chloride 0.9% lock flush 3 milliLiter(s) IV Push every 8 hours  spironolactone 25 milliGRAM(s) Oral daily    MEDICATIONS  (PRN):      Allergies    Keflex (Unknown)  strawberry (Hives)    Intolerances        SOCIAL HISTORY: single, developmentally delayed, lives with family, independent in ADLs    FAMILY HISTORY: non contributory       Vital Signs Last 24 Hrs  T(C): 36.9 (17 May 2023 11:58), Max: 36.9 (17 May 2023 11:58)  T(F): 98.4 (17 May 2023 11:58), Max: 98.4 (17 May 2023 11:58)  HR: 80 (17 May 2023 11:58) (74 - 85)  BP: 102/76 (17 May 2023 11:58) (102/76 - 126/84)  BP(mean): 87 (17 May 2023 04:32) (87 - 87)  RR: 18 (17 May 2023 11:58) (18 - 18)  SpO2: 97% (17 May 2023 11:58) (92% - 97%)    Parameters below as of 17 May 2023 11:58  Patient On (Oxygen Delivery Method): room air        Physical Exam  General: A/ox 3, No acute Distress  Neck: Supple, NO JVD  Cardiac: S1 S2, No M/R/G  Pulmonary: CTAB, Breathing unlabored, No Rhonchi/Rales/Wheezing  Abdomen: Soft, Non -tender, +BS x 4 quads  Extremities: No Rashes, BLE ankle edema  Neuro: A/o x 3, No focal deficits    LABS:                        13.1   4.41  )-----------( 86       ( 17 May 2023 06:50 )             37.2     05-17    132<L>  |  94<L>  |  19  ----------------------------<  92  3.6   |  25  |  0.66    Ca    8.7      17 May 2023 06:51    TPro  6.2  /  Alb  4.3  /  TBili  3.9<H>  /  DBili  x   /  AST  20  /  ALT  11  /  AlkPhos  68  05-17    PT/INR - ( 17 May 2023 06:50 )   PT: 29.0 sec;   INR: 2.50 ratio         PTT - ( 17 May 2023 06:50 )  PTT:40.6 sec  Urinalysis Basic - ( 16 May 2023 17:22 )    Color: Yellow / Appearance: Clear / S.015 / pH: x  Gluc: x / Ketone: Negative  / Bili: Negative / Urobili: Negative   Blood: x / Protein: 100 mg/dL / Nitrite: Negative   Leuk Esterase: Negative / RBC: 4 /hpf / WBC 12 /HPF   Sq Epi: x / Non Sq Epi: x / Bacteria: Negative        RADIOLOGY & ADDITIONAL STUDIES:  < from: Transthoracic Echocardiogram (23 @ 09:17) >  Patient name: BANDAR VAZ  YOB: 1970   Age: 52 (F)   MR#: [id number]  Study Date: 2023  [facility]  Study quality: Technically good  Referring Physician: Jabari Hess MD  Blood Pressure: 119/84 mmHg  Height: 157 cm  Weight: 66 kg  BSA: 1.7 m2  Heart Rate: 74 mmHg  ------------------------------------------------------------------------  PROCEDURE: Transthoracic echocardiogram with 2-D, M-Mode  and complete spectral and color flow Doppler. Real-time and  reconstructed 3-dimensional imaging was performed with  Workstation. Color Doppler analysis was carried out using  both 2D and 3D mapping.  INDICATION: Nonrheumatic mitral (valve) insufficiency  (I34.0)  ------------------------------------------------------------------------  MEASUREMENTS: (See below)  ------------------------------------------------------------------------  OBSERVATIONS:  Mitral Valve: Mitral annuloplasty ring/repair. There is at  least moderate mitral regurgitation by PISA quantitation.  By PISA method, ERO= 22mm2, regurgitant volume= 28ml/beat.  MV VTI PW annulus= 10.3cm  PISA radius= 0.73cm, alias velocity= 28.1cm/s.  MR VTI= 129cm, MR Vmax= 4.3m/s. The peak/mean diastolic  mitral gradients= 13/3mmHG(HR= 73bpm)  MV annular area by 3D planimetry= 3cm2.  Aortic Root: Aortic Root: 2.7 cm.  LVOT diameter: 2 cm.  Aortic arch= 2.7cm.  Aortic Valve: Normal trileaflet aortic valve. Peak  transaortic valve gradient equals 5 mm Hg, mean transaortic  valvegradient equals 3 mm Hg, aortic valve velocity time  integral equals 22 cm, estimated aortic valve area equals  2.2 sqcm.  LV SV= 48ml. No aortic valve regurgitation seen. Peak left  ventricular outflow tract gradient equals 3 mm Hg, mean  gradient is equal to 1 mm Hg, LVOT velocity time integral  equals 15 cm.  Left Atrium: Severely dilated left atrium.  LA volume index  = 130 cc/m2.  Left Ventricle: There is moderate-severe global hypokinesis  with flattening and thinning of the interventricular  septum.  The left ventricular function is moderate-severely  reduced.  The LVEF= 29% by 4D LV analysis. EDV= 123cm, LV  EDV index= 74ml/m2. The left ventricle is moderately  dilated.  Right Heart: Severe right atrial enlargement.  RA area= 34cm2, RA volume= 136ml. There is both systolic  and diastolic septal flattening consistent with right  ventricular pressure/volume overload.  The right ventricile is severely dilated with moderately  reduced function.  FAC= 26%.  Other quantitative parameters not possible.  Bioprosthetic tricuspid valve replacement is seen.  There is torrential, Grade5, tricuspid regurgitation.  VCW= 1 by 1.1cm.  TV PW annulus= 39cm.  TV annulus 3D= 4cm2.  TV SV= 156cm.  Regurgitant volume= 108ml, EROA= 1.74cm2.  PISA radius max= 1cm (alias velocity= 31.1cm/s.)  TR VTI= 62cm, TR Vmax= 2.1m/s.  The peak/mean diastolic tricuspid gradients= 13/6mmHg (HR=  66bpm)  TV VTI CW= 46cm. Grossly normal pulmonic valve leaflets.  There is dilatation of the RVOT.  The proximal PA appears to have a graft?  There is mild-moderate pulmonic regurgitation.  RVOT d= 2cm.  RVOT VTI= 20.5cm.  Pericardium/PleuraNormal pericardium with no pericardial  effusion.  Hemodynamic: The estimated right atrial pressure is  elevated. Estimated right ventricular systolic pressure  equals 38 mm Hg, assuming right atrial pressure equals 15  mm Hg, consistent with borderline pulmonary hypertension.  ------------------------------------------------------------------------    < end of copied text >

## 2023-05-17 NOTE — CONSULT NOTE ADULT - ASSESSMENT
#Elevated TSH   #Hypothyroidism  #Hx of Graves dx   TSH 31, elevated   however FT4 wnl at 1.5   new Right heart failure from chart review   hyponatremic - low suspicion that this is 2/2 hypothyroidism given free T4 wnl, however this could be due to volume overload iso CHF   CHF may also be causing decreased GI absorption of thyroid hormone replacement   Recommendations:   - currently on home medication, Tirosint (Levothyroxine) 150mcg PO qAM, should be taken on an empty stomach at least 30 mins apart from food/other meds, 4 hours apart from calcium, iron & multivitamins   - May consider switching to IV LT4 while inpatient given chf/elevated tsh to bypass any absorption issues  - TSH response/improvement will however lag behind    Mandie Munoz,    Endocrine Fellow  For follow up questions, discharge recommendations, or new consults please call answering service at [phone] (weekdays), [phone] (nights/weekends). For nonurgent matters, please email [email or web address] or [email or web address]      #Elevated TSH   #Hypothyroidism  #Hx of Graves dx   TSH 31, elevated; FT4 wnl at 1.5   new Right heart failure from chart review   hyponatremic - low suspicion that this is 2/2 hypothyroidism given free T4 wnl, however this could be due to volume overload iso CHF   CHF may also be causing decreased GI absorption of thyroid hormone replacement   Recommendations:   - currently on home medication, Tirosint (Levothyroxine) 150mcg PO qAM  - switch to LT4 112mcg IV daily   - TSH response/improvement will however lag behind  DC planning: likely back on home Tirosint, dose tbd. Outpatient follow up with her private endocrinologist Dr. Pebbles Coello.     Mandie Munoz,    Endocrine Fellow  For follow up questions, discharge recommendations, or new consults please call answering service at [phone] (weekdays), [phone] (nights/weekends). For nonurgent matters, please email [email or web address] or [email or web address]    53F PMH Afib on Coumadin (last dose 5/15/23 7.5mg) developmental delay, HTN, Anemia, Graves DX, Hypothyroid, Anemia , MV repair  TV replacement 2015 @ [place] who now presents for increased weight gain over last 6 weeks and shortness of breath in the setting of severe tricuspid regurgitation and right sided heart failure prior to Tricuspid Valve in Valve procedure tentatively planned for Friday of this week.  Endocrinology consulted for elevated tsh iso hypothyroidism.     #Elevated TSH   #Hypothyroidism  #Hx of Graves dx   TSH 31, elevated; FT4 wnl at 1.5   severe TR & right sided heart failure   hyponatremic - low suspicion that this is 2/2 hypothyroidism given free T4 wnl, however this could be due to volume overload iso CHF   CHF may also be causing decreased GI absorption of thyroid hormone replacement   has been having recent fluctuation of tfts over the last 3-4 months  was previously on stable LT4 dose of 137mcg daily   Now on Tirosint 150mcg daily; patient and mother report compliance, taking correctly   follows with Dr. Pebbles Coello   Recommendations:   - currently on home medication, Tirosint (Levothyroxine) 150mcg PO qAM- discontinued   - switch to LT4 112mcg IV daily while inpatient   - TSH response/improvement will however lag behind, FT4 wnl   DC planning: likely back on home Tirosint, dose tbd. Outpatient follow up with her private endocrinologist Dr. Pebbles Coello.     #osteoporosis   - on fosamax as outpatient   - continue outpatient f/u   - fall precautions     Discussed with Dr. Sean Munoz, DO   Endocrine Fellow  For follow up questions, discharge recommendations, or new consults please call answering service at [phone] (weekdays), [phone] (nights/weekends). For nonurgent matters, please email [email or web address] or [email or web address]

## 2023-05-17 NOTE — CONSULT NOTE ADULT - SUBJECTIVE AND OBJECTIVE BOX
ENDOCRINE INITIAL CONSULT - elevated tsh    HPI:  Patient is a 53F allergic to Keflex and Strawberries, PMH Afib on Coumadin (last dose 5/15/23 7.5mg) developmental delay, HTN, Anemia, Hx of Graves DX, Hypothyroid, Anemia , MV repair  TV replacement 2015 @ [place] who now presents for increased weight gain over last 6 weeks and shortness of breath in the setting of severe tricuspid regurgitation and right sided heart failure prior to Tricuspid Valve in Valve procedure tentatively planned for Friday of this week.  Currently patient denies chest pain.  +WHITE.  No shortness of breath while laying in bed and is able to speak in full sentences. (16 May 2023 16:14)      ENDOCRINE HISTORY   Graves Dx  Hypothyroidism - Tirosint 150mcg daily     PAST MEDICAL & SURGICAL HISTORY:  Chronic atrial fibrillation      Hypothyroid      Hypertension      Severe tricuspid regurgitation      Anemia      S/P mitral valve replacement      H/O tricuspid valve repair          FAMILY HISTORY:      Social History:  Patient does not ambulate with assistive devices    No metallic implants such as PPM/AICD    No orthopedic hardware    Currently works 3 days a week at the [employer] in [place] (16 May 2023 16:14)      Home Medications:      MEDICATIONS  (STANDING):  cholecalciferol 400 Unit(s) Oral at bedtime  coronavirus bivalent (EUA) Booster Vaccine (PFIZER) 0.3 milliLiter(s) IntraMuscular once  furosemide    Tablet 40 milliGRAM(s) Oral every 12 hours  metoprolol succinate  milliGRAM(s) Oral daily  multivitamin 1 Tablet(s) Oral daily  sodium chloride 0.9% lock flush 3 milliLiter(s) IV Push every 8 hours  spironolactone 25 milliGRAM(s) Oral daily  Tirosint (Levothyroxine) 150mcg Capsule 1 Capsule(s) 1 Capsule(s) Oral before breakfast    MEDICATIONS  (PRN):      Allergies    Keflex (Unknown)  strawberry (Hives)    Intolerances        REVIEW OF SYSTEMS  Constitutional: No fever  Eyes: No blurry vision  Neuro: No tremors  HEENT: No pain  Cardiovascular: No chest pain, palpitations  Respiratory: No SOB, no cough  GI: No nausea, vomiting, abdominal pain  : No dysuria  Skin: no rash  Psych: no depression  Endocrine: no polyuria, polydipsia  Hem/lymph: no swelling  Osteoporosis: no fractures  ALL OTHER SYSTEMS REVIEWED AND NEGATIVE     PHYSICAL EXAM   Vital Signs Last 24 Hrs  T(C): 36.7 (17 May 2023 04:32), Max: 36.7 (16 May 2023 19:51)  T(F): 98.1 (17 May 2023 04:32), Max: 98.1 (17 May 2023 04:32)  HR: 74 (17 May 2023 04:32) (74 - 85)  BP: 106/77 (17 May 2023 04:32) (106/77 - 126/84)  BP(mean): 87 (17 May 2023 04:32) (87 - 87)  RR: 18 (17 May 2023 04:32) (18 - 18)  SpO2: 95% (17 May 2023 04:32) (92% - 95%)    Parameters below as of 17 May 2023 04:32  Patient On (Oxygen Delivery Method): room air      GENERAL: NAD, well-groomed, well-developed  EYES: No proptosis, no lid lag, anicteric  HEENT:  Atraumatic, Normocephalic, moist mucous membranes  THYROID: Normal size, no palpable nodules  RESPIRATORY: Clear to auscultation bilaterally; No rales, rhonchi, wheezing  CARDIOVASCULAR: Regular rate and rhythm; No murmurs; no peripheral edema  GI: Soft, nontender, non distended, normal bowel sounds  SKIN: Dry, intact, No rashes or lesions  MUSCULOSKELETAL: Full range of motion, normal strength  NEURO: sensation intact, extraocular movements intact, no tremor  PSYCH: Alert and oriented x 3, normal affect, normal mood  CUSHING'S SIGNS: no striae    CAPILLARY BLOOD GLUCOSE          A1C with Estimated Average Glucose Result: 6.1 % (05-16-23 @ 17:09)                            13.1   4.41  )-----------( 86       ( 17 May 2023 06:50 )             37.2       05-17    132<L>  |  94<L>  |  19  ----------------------------<  92  3.6   |  25  |  0.66    Ca    8.7      17 May 2023 06:51    TPro  6.2  /  Alb  4.3  /  TBili  3.9<H>  /  DBili  x   /  AST  20  /  ALT  11  /  AlkPhos  68  05-17      Thyroid Stimulating Hormone, Serum: 31.00 uIU/mL (05-16-23 @ 17:08)  Free Thyroxine, Serum: 1.5 ng/dL (05.16.23 @ 17:08)        LIPIDS    RADIOLOGY ENDOCRINE INITIAL CONSULT - elevated tsh    HPI:  Patient is a 53F allergic to Keflex and Strawberries, PMH Afib on Coumadin (last dose 5/15/23 7.5mg) developmental delay, HTN, Anemia, Hx of Graves DX, Hypothyroid, Anemia , MV repair  TV replacement 2015 @ [place] who now presents for increased weight gain over last 6 weeks and shortness of breath in the setting of severe tricuspid regurgitation and right sided heart failure prior to Tricuspid Valve in Valve procedure tentatively planned for Friday of this week.  Currently patient denies chest pain.  +WHITE.  No shortness of breath while laying in bed and is able to speak in full sentences. (16 May 2023 16:14)      ENDOCRINE HISTORY   Patient seen earlier this morning.   Reports hx of Graves disease many years ago. Says she does not recall how she was treated. Does not recall having any thyroid surgeries. Does not know if she was treated with GABRIEL but reports she has had hypothyroidism for a long time.   Reports that her thyroid medication was changed from Synthroid to Tirosint several weeks ago as it was "not working for her."   Does not recall her TFTs or what was wrong.   Reports compliance with Tirosint.   Hypothyroidism - Tirosint 150mcg daily     PAST MEDICAL & SURGICAL HISTORY:  Chronic atrial fibrillation      Hypothyroid      Hypertension      Severe tricuspid regurgitation      Anemia      S/P mitral valve replacement      H/O tricuspid valve repair          FAMILY HISTORY:      Social History:  Patient does not ambulate with assistive devices    No metallic implants such as PPM/AICD    No orthopedic hardware    Currently works 3 days a week at the [employer] in [place] (16 May 2023 16:14)      Home Medications:      MEDICATIONS  (STANDING):  cholecalciferol 400 Unit(s) Oral at bedtime  coronavirus bivalent (EUA) Booster Vaccine (PFIZER) 0.3 milliLiter(s) IntraMuscular once  furosemide    Tablet 40 milliGRAM(s) Oral every 12 hours  metoprolol succinate  milliGRAM(s) Oral daily  multivitamin 1 Tablet(s) Oral daily  sodium chloride 0.9% lock flush 3 milliLiter(s) IV Push every 8 hours  spironolactone 25 milliGRAM(s) Oral daily  Tirosint (Levothyroxine) 150mcg Capsule 1 Capsule(s) 1 Capsule(s) Oral before breakfast    MEDICATIONS  (PRN):      Allergies    Keflex (Unknown)  strawberry (Hives)    Intolerances        REVIEW OF SYSTEMS  Constitutional: No fever  Eyes: No blurry vision  Neuro: No tremors  HEENT: No pain  Cardiovascular: No chest pain, palpitations  Respiratory: No SOB, no cough  GI: No nausea, vomiting, abdominal pain  : No dysuria  Skin: no rash  Psych: no depression  Endocrine: no polyuria, polydipsia  Hem/lymph: no swelling  Osteoporosis: no fractures  ALL OTHER SYSTEMS REVIEWED AND NEGATIVE     PHYSICAL EXAM   Vital Signs Last 24 Hrs  T(C): 36.7 (17 May 2023 04:32), Max: 36.7 (16 May 2023 19:51)  T(F): 98.1 (17 May 2023 04:32), Max: 98.1 (17 May 2023 04:32)  HR: 74 (17 May 2023 04:32) (74 - 85)  BP: 106/77 (17 May 2023 04:32) (106/77 - 126/84)  BP(mean): 87 (17 May 2023 04:32) (87 - 87)  RR: 18 (17 May 2023 04:32) (18 - 18)  SpO2: 95% (17 May 2023 04:32) (92% - 95%)    Parameters below as of 17 May 2023 04:32  Patient On (Oxygen Delivery Method): room air      GENERAL: NAD, well-groomed, well-developed  EYES: No proptosis, no lid lag, anicteric  HEENT:  Atraumatic, Normocephalic, moist mucous membranes  THYROID: Normal size, no palpable nodules  RESPIRATORY: Clear to auscultation bilaterally; No rales, rhonchi, wheezing  CARDIOVASCULAR: Regular rate and rhythm; No murmurs; no peripheral edema  GI: Soft, nontender, non distended, normal bowel sounds  SKIN: Dry, intact, No rashes or lesions  MUSCULOSKELETAL: Full range of motion, normal strength  NEURO: sensation intact, extraocular movements intact, no tremor  PSYCH: Alert and oriented x 3, normal affect, normal mood  CUSHING'S SIGNS: no striae    CAPILLARY BLOOD GLUCOSE          A1C with Estimated Average Glucose Result: 6.1 % (05-16-23 @ 17:09)                            13.1   4.41  )-----------( 86       ( 17 May 2023 06:50 )             37.2       05-17    132<L>  |  94<L>  |  19  ----------------------------<  92  3.6   |  25  |  0.66    Ca    8.7      17 May 2023 06:51    TPro  6.2  /  Alb  4.3  /  TBili  3.9<H>  /  DBili  x   /  AST  20  /  ALT  11  /  AlkPhos  68  05-17      Thyroid Stimulating Hormone, Serum: 31.00 uIU/mL (05-16-23 @ 17:08)  Free Thyroxine, Serum: 1.5 ng/dL (05.16.23 @ 17:08)        LIPIDS    RADIOLOGY ENDOCRINE INITIAL CONSULT - elevated tsh    HPI:  Patient is a 53F allergic to Keflex and Strawberries, PMH Afib on Coumadin (last dose 5/15/23 7.5mg) developmental delay, HTN, Anemia, Hx of Graves DX, Hypothyroid, Anemia , MV repair  TV replacement 2015 @ [place] who now presents for increased weight gain over last 6 weeks and shortness of breath in the setting of severe tricuspid regurgitation and right sided heart failure prior to Tricuspid Valve in Valve procedure tentatively planned for Friday of this week.  Currently patient denies chest pain.  +WHITE.  No shortness of breath while laying in bed and is able to speak in full sentences. (16 May 2023 16:14)      ENDOCRINE HISTORY   Patient seen earlier this morning.   Reports hx of Graves disease many years ago. Says she does not recall how she was treated. Does not recall having any thyroid surgeries. Does not know if she was treated with GABRIEL but reports she has had hypothyroidism for a long time.   Reports that her thyroid medication was changed from Synthroid to Tirosint several weeks ago as it was "not working for her."   Does not recall her TFTs or what was wrong.   Reports compliance with Tirosint. Reports she takes it every morning on an empty stomach. Does not miss doses.   Reports she feels well. Does not report excessive fatigue, constipation,   Hypothyroidism - Tirosint 150mcg daily     Per discussion with patient's mother, patient follows with Endocrinologist Dr. Pebbles Coello, located in [place]   had GABRIEL for her Graves years ago with Dr. Coello   Was well managed on Synthroid for year   3-4 months ago numbers starting to go up  Discovered that rx was for levothyroxine instead of synthroid and thought that this was the reason for the tfts now becoming abnormal   They decided to Tirosint (gel capsule) - maybe will be better absorption   TSH    PAST MEDICAL & SURGICAL HISTORY:  Chronic atrial fibrillation      Hypothyroid      Hypertension      Severe tricuspid regurgitation      Anemia      S/P mitral valve replacement      H/O tricuspid valve repair          FAMILY HISTORY:      Social History:  Patient does not ambulate with assistive devices    No metallic implants such as PPM/AICD    No orthopedic hardware    Currently works 3 days a week at the [employer] in [place] (16 May 2023 16:14)      Home Medications:      MEDICATIONS  (STANDING):  cholecalciferol 400 Unit(s) Oral at bedtime  coronavirus bivalent (EUA) Booster Vaccine (PFIZER) 0.3 milliLiter(s) IntraMuscular once  furosemide    Tablet 40 milliGRAM(s) Oral every 12 hours  metoprolol succinate  milliGRAM(s) Oral daily  multivitamin 1 Tablet(s) Oral daily  sodium chloride 0.9% lock flush 3 milliLiter(s) IV Push every 8 hours  spironolactone 25 milliGRAM(s) Oral daily  Tirosint (Levothyroxine) 150mcg Capsule 1 Capsule(s) 1 Capsule(s) Oral before breakfast    MEDICATIONS  (PRN):      Allergies    Keflex (Unknown)  strawberry (Hives)    Intolerances        REVIEW OF SYSTEMS  Constitutional: No fever  Eyes: No blurry vision  Neuro: No tremors  HEENT: No pain  Cardiovascular: No chest pain, palpitations  Respiratory: No SOB, no cough  GI: No nausea, vomiting, abdominal pain  : No dysuria  Skin: no rash  Psych: no depression  Endocrine: no polyuria, polydipsia  Hem/lymph: no swelling  Osteoporosis: no fractures  ALL OTHER SYSTEMS REVIEWED AND NEGATIVE     PHYSICAL EXAM   Vital Signs Last 24 Hrs  T(C): 36.7 (17 May 2023 04:32), Max: 36.7 (16 May 2023 19:51)  T(F): 98.1 (17 May 2023 04:32), Max: 98.1 (17 May 2023 04:32)  HR: 74 (17 May 2023 04:32) (74 - 85)  BP: 106/77 (17 May 2023 04:32) (106/77 - 126/84)  BP(mean): 87 (17 May 2023 04:32) (87 - 87)  RR: 18 (17 May 2023 04:32) (18 - 18)  SpO2: 95% (17 May 2023 04:32) (92% - 95%)    Parameters below as of 17 May 2023 04:32  Patient On (Oxygen Delivery Method): room air      GENERAL: NAD, well-groomed, well-developed  EYES: No proptosis, no lid lag, anicteric  HEENT:  Atraumatic, Normocephalic, moist mucous membranes  THYROID: Normal size, no palpable nodules  RESPIRATORY: Clear to auscultation bilaterally; No rales, rhonchi, wheezing  CARDIOVASCULAR: Regular rate and rhythm; No murmurs; no peripheral edema  GI: Soft, nontender, non distended, normal bowel sounds  SKIN: Dry, intact, No rashes or lesions  MUSCULOSKELETAL: Full range of motion, normal strength  NEURO: sensation intact, extraocular movements intact, no tremor  PSYCH: Alert and oriented x 3, normal affect, normal mood  CUSHING'S SIGNS: no striae    CAPILLARY BLOOD GLUCOSE          A1C with Estimated Average Glucose Result: 6.1 % (05-16-23 @ 17:09)                            13.1   4.41  )-----------( 86       ( 17 May 2023 06:50 )             37.2       05-17    132<L>  |  94<L>  |  19  ----------------------------<  92  3.6   |  25  |  0.66    Ca    8.7      17 May 2023 06:51    TPro  6.2  /  Alb  4.3  /  TBili  3.9<H>  /  DBili  x   /  AST  20  /  ALT  11  /  AlkPhos  68  05-17      Thyroid Stimulating Hormone, Serum: 31.00 uIU/mL (05-16-23 @ 17:08)  Free Thyroxine, Serum: 1.5 ng/dL (05.16.23 @ 17:08)        LIPIDS    RADIOLOGY ENDOCRINE INITIAL CONSULT - elevated tsh    HPI:  Patient is a 53F allergic to Keflex and Strawberries, PMH Afib on Coumadin (last dose 5/15/23 7.5mg) developmental delay, HTN, Anemia, Hx of Graves DX, Hypothyroid, Anemia , MV repair  TV replacement 2015 @ [place] who now presents for increased weight gain over last 6 weeks and shortness of breath in the setting of severe tricuspid regurgitation and right sided heart failure prior to Tricuspid Valve in Valve procedure tentatively planned for Friday of this week.  Currently patient denies chest pain.  +WHITE.  No shortness of breath while laying in bed and is able to speak in full sentences. (16 May 2023 16:14)      ENDOCRINE HISTORY   Patient seen earlier this morning. History from patient limited as patient a poor historian. Reports she lives alone and takes her medications on her own. Parents live close by and help her with her medical care.   Reports hx of Graves disease many years ago. Says she does not recall how she was treated. Does not recall having any thyroid surgeries. Does not know if she was treated with GABRIEL but reports she has had hypothyroidism for a long time.   Reports that her thyroid medication was changed from Synthroid to Tirosint several weeks ago as it was "not working for her."   Does not recall her TFTs or what was wrong.   Reports compliance with Tirosint. Reports she takes it every morning on an empty stomach. Does not miss doses.   Reports she feels well. Does not report excessive fatigue, constipation,   Hypothyroidism - Tirosint 150mcg daily     Per discussion with patient's mother, [place], patient follows with Endocrinologist Dr. Pebbles Coello, located in [place]   Mother says patient has had GABRIEL for her Graves dx years ago, done with Dr. Coello   Was well managed on Synthroid 137mcg for years   3-4 months ago "numbers starting to go up" per mom   Discovered that rx was for levothyroxine generic instead of synthroid and thought that this was the reason for the tfts now becoming abnormal. They initially tried to go back to Synthroid brand only but this was also not working to control TFTs well per mom.   Eventually decided to switch to Tirosint (gel capsule), thinking was that maybe this will help with any absorption issues     Mother does not recall last TSH but says it was high.   Labs from [facility] reviewed   Jan 2023 TSH 12.5/FT4 1.9 (on LT4 137mcg daily? instead of brand synthroid)   repeat TSH 13.1/FT4 1.9 > TSH 10.3/FT4 2.1 >> TSH 15.4 w/ FT4 1.9 in April 2023 - changed to Tirosint 150mcg daily     Both mother and patient confirm that patient is compliant. Never misses her thyroid medication. Always takes it correctly, every morning at 6AM.   Patient also on Ca supplement but takes this at night.   Has a hx of Osteoporosis for which she takes Fosamax once weekly.       PAST MEDICAL & SURGICAL HISTORY:  Chronic atrial fibrillation      Hypothyroid      Hypertension      Severe tricuspid regurgitation      Anemia      S/P mitral valve replacement      H/O tricuspid valve repair          FAMILY HISTORY:      Social History:  Patient does not ambulate with assistive devices    No metallic implants such as PPM/AICD    No orthopedic hardware    Currently works 3 days a week at the [employer] in [place] (16 May 2023 16:14)      Home Medications:      MEDICATIONS  (STANDING):  cholecalciferol 400 Unit(s) Oral at bedtime  coronavirus bivalent (EUA) Booster Vaccine (PFIZER) 0.3 milliLiter(s) IntraMuscular once  furosemide    Tablet 40 milliGRAM(s) Oral every 12 hours  metoprolol succinate  milliGRAM(s) Oral daily  multivitamin 1 Tablet(s) Oral daily  sodium chloride 0.9% lock flush 3 milliLiter(s) IV Push every 8 hours  spironolactone 25 milliGRAM(s) Oral daily  Tirosint (Levothyroxine) 150mcg Capsule 1 Capsule(s) 1 Capsule(s) Oral before breakfast    MEDICATIONS  (PRN):      Allergies    Keflex (Unknown)  strawberry (Hives)    Intolerances        REVIEW OF SYSTEMS  Constitutional: No fever  Eyes: No blurry vision  Neuro: No tremors  HEENT: No pain  Cardiovascular: No chest pain, palpitations  Respiratory: No SOB, no cough  GI: No nausea, vomiting, abdominal pain  : No dysuria  Skin: no rash  Psych: no depression  Endocrine: no polyuria, polydipsia  Hem/lymph: no swelling  Osteoporosis: no fractures  ALL OTHER SYSTEMS REVIEWED AND NEGATIVE     PHYSICAL EXAM   Vital Signs Last 24 Hrs  T(C): 36.7 (17 May 2023 04:32), Max: 36.7 (16 May 2023 19:51)  T(F): 98.1 (17 May 2023 04:32), Max: 98.1 (17 May 2023 04:32)  HR: 74 (17 May 2023 04:32) (74 - 85)  BP: 106/77 (17 May 2023 04:32) (106/77 - 126/84)  BP(mean): 87 (17 May 2023 04:32) (87 - 87)  RR: 18 (17 May 2023 04:32) (18 - 18)  SpO2: 95% (17 May 2023 04:32) (92% - 95%)    Parameters below as of 17 May 2023 04:32  Patient On (Oxygen Delivery Method): room air      GENERAL: NAD, well-groomed, well-developed  EYES: No proptosis, no lid lag, anicteric  HEENT:  Atraumatic, Normocephalic, moist mucous membranes  THYROID: Normal size, no palpable nodules  RESPIRATORY: Clear to auscultation bilaterally; No rales, rhonchi, wheezing  CARDIOVASCULAR: Regular rate and rhythm; No murmurs; no peripheral edema  GI: Soft, nontender, non distended, normal bowel sounds  SKIN: Dry, intact, No rashes or lesions  MUSCULOSKELETAL: Full range of motion, normal strength  NEURO: sensation intact, extraocular movements intact, no tremor  PSYCH: Alert and oriented x 3, reactive affect/reactive mood  CUSHING'S SIGNS: no striae    CAPILLARY BLOOD GLUCOSE          A1C with Estimated Average Glucose Result: 6.1 % (05-16-23 @ 17:09)                            13.1   4.41  )-----------( 86       ( 17 May 2023 06:50 )             37.2       05-17    132<L>  |  94<L>  |  19  ----------------------------<  92  3.6   |  25  |  0.66    Ca    8.7      17 May 2023 06:51    TPro  6.2  /  Alb  4.3  /  TBili  3.9<H>  /  DBili  x   /  AST  20  /  ALT  11  /  AlkPhos  68  05-17      Thyroid Stimulating Hormone, Serum: 31.00 uIU/mL (05-16-23 @ 17:08)  Free Thyroxine, Serum: 1.5 ng/dL (05.16.23 @ 17:08)        LIPIDS    RADIOLOGY

## 2023-05-17 NOTE — CONSULT NOTE ADULT - ATTENDING COMMENTS
Patient is noted to have an elevated TSH but normal free T4 level.  Patient is currently on Tirosint 150 mcg once daily.  She reports adherence to her medication.  Patient feels well clinically.  Given elevated proBNP, andmild suspicion for gut edema, will recommend to replace oral thyroid hormone replacement with IV levothyroxine.  The equivalent doses is 112 mcg IV daily.  Of note, TSH level will take a couple weeks to normalize.  We can consider repeating a TSH level tomorrow, along with a free T4 level.

## 2023-05-18 ENCOUNTER — TRANSCRIPTION ENCOUNTER (OUTPATIENT)
Age: 53
End: 2023-05-18

## 2023-05-18 LAB
ALBUMIN SERPL ELPH-MCNC: 4.3 G/DL — SIGNIFICANT CHANGE UP (ref 3.3–5)
ALP SERPL-CCNC: 76 U/L — SIGNIFICANT CHANGE UP (ref 40–120)
ALT FLD-CCNC: 12 U/L — SIGNIFICANT CHANGE UP (ref 10–45)
ANION GAP SERPL CALC-SCNC: 14 MMOL/L — SIGNIFICANT CHANGE UP (ref 5–17)
APTT BLD: 39.4 SEC — HIGH (ref 27.5–35.5)
AST SERPL-CCNC: 20 U/L — SIGNIFICANT CHANGE UP (ref 10–40)
BILIRUB SERPL-MCNC: 3.7 MG/DL — HIGH (ref 0.2–1.2)
BLD GP AB SCN SERPL QL: NEGATIVE — SIGNIFICANT CHANGE UP
BUN SERPL-MCNC: 20 MG/DL — SIGNIFICANT CHANGE UP (ref 7–23)
CALCIUM SERPL-MCNC: 8.8 MG/DL — SIGNIFICANT CHANGE UP (ref 8.4–10.5)
CHLORIDE SERPL-SCNC: 94 MMOL/L — LOW (ref 96–108)
CO2 SERPL-SCNC: 24 MMOL/L — SIGNIFICANT CHANGE UP (ref 22–31)
CREAT SERPL-MCNC: 0.8 MG/DL — SIGNIFICANT CHANGE UP (ref 0.5–1.3)
EGFR: 88 ML/MIN/1.73M2 — SIGNIFICANT CHANGE UP
GLUCOSE SERPL-MCNC: 106 MG/DL — HIGH (ref 70–99)
HCT VFR BLD CALC: 37.2 % — SIGNIFICANT CHANGE UP (ref 34.5–45)
HGB BLD-MCNC: 12.9 G/DL — SIGNIFICANT CHANGE UP (ref 11.5–15.5)
INR BLD: 2.1 RATIO — HIGH (ref 0.88–1.16)
MCHC RBC-ENTMCNC: 33.9 PG — SIGNIFICANT CHANGE UP (ref 27–34)
MCHC RBC-ENTMCNC: 34.7 GM/DL — SIGNIFICANT CHANGE UP (ref 32–36)
MCV RBC AUTO: 97.6 FL — SIGNIFICANT CHANGE UP (ref 80–100)
NRBC # BLD: 0 /100 WBCS — SIGNIFICANT CHANGE UP (ref 0–0)
PLATELET # BLD AUTO: 88 K/UL — LOW (ref 150–400)
POTASSIUM SERPL-MCNC: 3.5 MMOL/L — SIGNIFICANT CHANGE UP (ref 3.5–5.3)
POTASSIUM SERPL-SCNC: 3.5 MMOL/L — SIGNIFICANT CHANGE UP (ref 3.5–5.3)
PROT SERPL-MCNC: 6.2 G/DL — SIGNIFICANT CHANGE UP (ref 6–8.3)
PROTHROM AB SERPL-ACNC: 24.5 SEC — HIGH (ref 10.5–13.4)
RBC # BLD: 3.81 M/UL — SIGNIFICANT CHANGE UP (ref 3.8–5.2)
RBC # FLD: 14.6 % — HIGH (ref 10.3–14.5)
RH IG SCN BLD-IMP: POSITIVE — SIGNIFICANT CHANGE UP
SODIUM SERPL-SCNC: 132 MMOL/L — LOW (ref 135–145)
WBC # BLD: 4.57 K/UL — SIGNIFICANT CHANGE UP (ref 3.8–10.5)
WBC # FLD AUTO: 4.57 K/UL — SIGNIFICANT CHANGE UP (ref 3.8–10.5)

## 2023-05-18 PROCEDURE — 99232 SBSQ HOSP IP/OBS MODERATE 35: CPT | Mod: GC

## 2023-05-18 PROCEDURE — 99232 SBSQ HOSP IP/OBS MODERATE 35: CPT

## 2023-05-18 PROCEDURE — 99231 SBSQ HOSP IP/OBS SF/LOW 25: CPT

## 2023-05-18 RX ORDER — CEFUROXIME AXETIL 250 MG
1500 TABLET ORAL ONCE
Refills: 0 | Status: DISCONTINUED | OUTPATIENT
Start: 2023-05-18 | End: 2023-05-19

## 2023-05-18 RX ORDER — CHLORHEXIDINE GLUCONATE 213 G/1000ML
1 SOLUTION TOPICAL ONCE
Refills: 0 | Status: COMPLETED | OUTPATIENT
Start: 2023-05-18 | End: 2023-05-18

## 2023-05-18 RX ORDER — CHLORHEXIDINE GLUCONATE 213 G/1000ML
30 SOLUTION TOPICAL ONCE
Refills: 0 | Status: DISCONTINUED | OUTPATIENT
Start: 2023-05-18 | End: 2023-05-19

## 2023-05-18 RX ADMIN — SODIUM CHLORIDE 3 MILLILITER(S): 9 INJECTION INTRAMUSCULAR; INTRAVENOUS; SUBCUTANEOUS at 05:22

## 2023-05-18 RX ADMIN — Medication 1 TABLET(S): at 12:02

## 2023-05-18 RX ADMIN — SPIRONOLACTONE 25 MILLIGRAM(S): 25 TABLET, FILM COATED ORAL at 06:39

## 2023-05-18 RX ADMIN — CHLORHEXIDINE GLUCONATE 1 APPLICATION(S): 213 SOLUTION TOPICAL at 22:09

## 2023-05-18 RX ADMIN — Medication 40 MILLIGRAM(S): at 14:18

## 2023-05-18 RX ADMIN — SODIUM CHLORIDE 3 MILLILITER(S): 9 INJECTION INTRAMUSCULAR; INTRAVENOUS; SUBCUTANEOUS at 14:38

## 2023-05-18 RX ADMIN — Medication 112 MICROGRAM(S): at 12:03

## 2023-05-18 RX ADMIN — Medication 100 MILLIGRAM(S): at 06:39

## 2023-05-18 RX ADMIN — Medication 40 MILLIGRAM(S): at 06:38

## 2023-05-18 RX ADMIN — Medication 400 UNIT(S): at 22:10

## 2023-05-18 RX ADMIN — SODIUM CHLORIDE 3 MILLILITER(S): 9 INJECTION INTRAMUSCULAR; INTRAVENOUS; SUBCUTANEOUS at 22:41

## 2023-05-18 NOTE — PROGRESS NOTE ADULT - ASSESSMENT
53F PMH Afib on Coumadin (last dose 5/15/23 7.5mg) developmental delay, HTN, Anemia, Graves DX, Hypothyroid, Anemia , MV repair  TV replacement 2015 @ [place] who now presents for increased weight gain over last 6 weeks and shortness of breath in the setting of severe tricuspid regurgitation and right sided heart failure prior to Tricuspid Valve in Valve procedure tentatively planned for Friday of this week.  Endocrinology consulted for elevated tsh iso hypothyroidism.     #Elevated TSH   #Hypothyroidism  #Hx of Graves dx   TSH 31, elevated; FT4 wnl at 1.5   severe TR & right sided heart failure   hyponatremia - low suspicion that this is 2/2 hypothyroidism given free T4 wnl, however this could be due to volume overload iso CHF   CHF may also be causing decreased GI absorption of thyroid hormone replacement   has been having recent fluctuation of tfts over the last 3-4 months  was previously on stable LT4 dose of 137mcg daily   Now on Tirosint 150mcg daily; patient and mother report compliance, taking correctly   follows with Dr. Pebbles Coello   Recommendations:   - continue LT4 112mcg IV daily while inpatient   - TSH response/improvement will however lag behind, FT4 wnl   - Repeat TSH, FT4, TT3  DC planning: likely back on home Tirosint, dose tbd. Outpatient follow up with her private endocrinologist Dr. Pebbles Coello.     #osteoporosis   - on fosamax as outpatient   - continue outpatient f/u   - fall precautions     Patient's thyroid hormone levels do not suggest patient should have important tricuspid procedure delayed. Proceed with surgery as planned. Discussed with surgery attending.    Dipak Hurtado DO, Endocrinology Fellow  For follow-up questions, discharge recommendations, or new consults please call answering service at [phone] (weekdays), [phone] (nights/weekends). For nonurgent matters, please email [email or web address] or [email or web address]. 53F PMH Afib on Coumadin (last dose 5/15/23 7.5mg) developmental delay, HTN, Anemia, Graves DX, Hypothyroid, Anemia , MV repair  TV replacement 2015 @ [place] who now presents for increased weight gain over last 6 weeks and shortness of breath in the setting of severe tricuspid regurgitation and right sided heart failure prior to Tricuspid Valve in Valve procedure tentatively planned for Friday of this week.  Endocrinology consulted for elevated tsh iso hypothyroidism.     #Elevated TSH   #Hypothyroidism  #Hx of Graves dx   TSH 31, elevated; FT4 wnl at 1.5   severe TR & right sided heart failure   hyponatremia - low suspicion that this is 2/2 hypothyroidism given free T4 wnl, however this could be due to volume overload iso CHF   CHF may also be causing decreased GI absorption of thyroid hormone replacement   has been having recent fluctuation of tfts over the last 3-4 months  was previously on stable LT4 dose of 137mcg daily   Now on Tirosint 150mcg daily; patient and mother report compliance, taking correctly   follows with Dr. Pebbles Coello   Recommendations:   - continue LT4 112mcg IV daily while inpatient   - TSH response/improvement will however lag behind, FT4 wnl   - Repeat TSH, FT4, TT3  DC planning: likely back on home Tirosint, dose tbd. Outpatient follow up with her private endocrinologist Dr. Pebbles Coello.     #osteoporosis   - on fosamax as outpatient   - continue outpatient f/u   - fall precautions     Prediabetes  A1c 6.1.  BG well controlled  F/u as outpatient    Patient's thyroid hormone levels do not suggest patient should have important tricuspid procedure delayed. Proceed with surgery as planned. Discussed with surgery attending.    Dipak Hurtado DO, Endocrinology Fellow  For follow-up questions, discharge recommendations, or new consults please call answering service at [phone] (weekdays), [phone] (nights/weekends). For nonurgent matters, please email [email or web address] or [email or web address].

## 2023-05-18 NOTE — PROGRESS NOTE ADULT - ASSESSMENT
53F PMH Afib on Coumadin (last dose 5/15/23 7.5mg) developmental delay, HTN, Anemia, Graves DX, Hypothyroid, Anemia, MV repair  TV replacement 2015 @ [place] who now presents for increased weight gain over last 6 weeks and shortness of breath in the setting of severe tricuspid regurgitation and right sided heart failure prior to Tricuspid Valve in Valve procedure tentatively planned for Friday of this week.  Currently patient denies chest pain.  +WHITE.  No shortness of breath while laying in bed and is able to speak in full sentences.    Hospital Course:   5/17 TSH 33 > nml T4  H. Endo consulted --> Started on synthroid 112 mcg IV.   5/18 VVS; OR deferred until next week 2/2 hypothyroid, TSH 33.  Endocrine following.  INR 2.10 --> Will start heparin gtt once INR is less than 2.  Structural Team following.    Patient cleared by Endo for OR in AM.   Will proceed with surgery in AM.  NPO after midnight.

## 2023-05-18 NOTE — PROGRESS NOTE ADULT - ATTENDING COMMENTS
Agree with assessment and plan as above by Dr. Hurtado. Reviewed all pertinent labs, glucose values, and imaging studies. Modifications made as indicated above. Pt. with post-ablative hypothyroidism on previously stable dose of thyroid hormone replacement now with elevated TSH likely in the setting of poor absorption from bowel edema with CHF. Started on IV levothyroxine for better absorption which should improve the TSH. Check Total T3 to make sure T3 level is not contributing to current thyroid function test results. Can proceed with surgery. Trend TFTs and follow-up as outpatient with Dr. Coello.    Micah Cuadra D.O  [phone]

## 2023-05-18 NOTE — PROGRESS NOTE ADULT - ASSESSMENT
53F PMH Afib on Coumadin (last dose 5/15/23 7.5mg) developmental delay, HTN, Anemia, Graves DX, Hypothyroid, Anemia, MV repair  TV replacement 2015 @ [place] who now presents for increased weight gain over last 6 weeks and shortness of breath in the setting of severe tricuspid regurgitation and right sided heart failure prior to Tricuspid Valve in Valve procedure tentatively planned for Friday of this week.  Currently patient denies chest pain.  +WHITE.  No shortness of breath while laying in bed and is able to speak in full sentences.    Hospital Course:   5/17 TSH 33 > nml T4  H. Endo consulted --> Started on synthroid 112 mcg IV.   5/18 VVS; OR deferred until next week 2/2 hypothyroid.  Endocrine following.  INR 2.10 --> Will start heparin gtt once INR is less than 2.  Structural Team following.       53F PMH Afib on Coumadin (last dose 5/15/23 7.5mg) developmental delay, HTN, Anemia, Graves DX, Hypothyroid, Anemia, MV repair  TV replacement 2015 @ [place] who now presents for increased weight gain over last 6 weeks and shortness of breath in the setting of severe tricuspid regurgitation and right sided heart failure prior to Tricuspid Valve in Valve procedure tentatively planned for Friday of this week.  Currently patient denies chest pain.  +WHITE.  No shortness of breath while laying in bed and is able to speak in full sentences.    Hospital Course:   5/17 TSH 33 > nml T4  H. Endo consulted --> Started on synthroid 112 mcg IV.   5/18 VVS; OR deferred until next week 2/2 hypothyroid, TSH 33.  Endocrine following.  INR 2.10 --> Will start heparin gtt once INR is less than 2.  Structural Team following.

## 2023-05-18 NOTE — PROGRESS NOTE ADULT - ASSESSMENT
Patient is a 53F allergic to Keflex and Strawberries, PMH Afib on Coumadin (last dose 5/15/23 7.5mg) developmental delay, HTN, Anemia, Graves DX, Hypothyroid, Anemia , MV repair  TV replacement 2015 @ [place] who now presents for increased weight gain over last 6 weeks and shortness of breath in the setting of severe tricuspid regurgitation and right sided heart failure prior to Tricuspid Valve in Valve on Friday 5/19 with Dr. Hess and Dr. Farmer.

## 2023-05-19 PROCEDURE — 99232 SBSQ HOSP IP/OBS MODERATE 35: CPT

## 2023-05-19 PROCEDURE — 0646T TTVI/RPLCMT W/PRSTC VLV PERQ: CPT | Mod: Q0

## 2023-05-19 PROCEDURE — 93010 ELECTROCARDIOGRAM REPORT: CPT

## 2023-05-19 PROCEDURE — 0646T TTVI/RPLCMT W/PRSTC VLV PERQ: CPT | Mod: 62,Q0

## 2023-05-19 PROCEDURE — 93355 ECHO TRANSESOPHAGEAL (TEE): CPT

## 2023-05-19 PROCEDURE — 99231 SBSQ HOSP IP/OBS SF/LOW 25: CPT

## 2023-05-19 DEVICE — SHEATH INTRODUCER TERUMO PINNACLE CORONARY 8FR X 10CM X 0.038" MINI WIRE: Type: IMPLANTABLE DEVICE | Status: FUNCTIONAL

## 2023-05-19 DEVICE — GUIDEWIRE AMPLATZ EXTRA-STIFF CURVED .035" X 260CM: Type: IMPLANTABLE DEVICE | Status: FUNCTIONAL

## 2023-05-19 DEVICE — SHEATH INTRODUCER TERUMO PINNACLE CORONARY 6FR X 10CM X 0.038" MINI WIRE: Type: IMPLANTABLE DEVICE | Status: FUNCTIONAL

## 2023-05-19 DEVICE — GWIRE GUID  0.035INX150CM: Type: IMPLANTABLE DEVICE | Status: FUNCTIONAL

## 2023-05-19 DEVICE — WIRE GUIDE EXCHANGE J TIP 3MM X 260CM: Type: IMPLANTABLE DEVICE | Status: FUNCTIONAL

## 2023-05-19 DEVICE — IMPLANTABLE DEVICE: Type: IMPLANTABLE DEVICE | Status: FUNCTIONAL

## 2023-05-19 DEVICE — INTRO AGLIS NXT MED CURL 8.5FR X  71: Type: IMPLANTABLE DEVICE | Status: FUNCTIONAL

## 2023-05-19 DEVICE — KIT A-LINE 1LUM 20G X 12CM SAFE KIT: Type: IMPLANTABLE DEVICE | Status: FUNCTIONAL

## 2023-05-19 DEVICE — VLV TRANS CATH W/COMM SYS SAPIEN 3 ULTRA 29MM: Type: IMPLANTABLE DEVICE | Status: FUNCTIONAL

## 2023-05-19 DEVICE — WIRE GD SAFARI2 275CM XSML CRV: Type: IMPLANTABLE DEVICE | Status: FUNCTIONAL

## 2023-05-19 DEVICE — CATH VASCULAR EXPO VENTRICULAR PIGTAIL CURVE (PIG 145) 0.045" X 5FR X 10CM: Type: IMPLANTABLE DEVICE | Status: FUNCTIONAL

## 2023-05-19 DEVICE — CATH VASC EXPO MULTIPURPOSE A CURVE MPA1 5FR X 100CMM: Type: IMPLANTABLE DEVICE | Status: FUNCTIONAL

## 2023-05-19 RX ORDER — ASPIRIN/CALCIUM CARB/MAGNESIUM 324 MG
81 TABLET ORAL DAILY
Refills: 0 | Status: DISCONTINUED | OUTPATIENT
Start: 2023-05-19 | End: 2023-05-22

## 2023-05-19 RX ORDER — SPIRONOLACTONE 25 MG/1
25 TABLET, FILM COATED ORAL DAILY
Refills: 0 | Status: DISCONTINUED | OUTPATIENT
Start: 2023-05-19 | End: 2023-05-22

## 2023-05-19 RX ORDER — LEVOTHYROXINE SODIUM 125 MCG
112 TABLET ORAL
Refills: 0 | Status: DISCONTINUED | OUTPATIENT
Start: 2023-05-19 | End: 2023-05-20

## 2023-05-19 RX ORDER — CEFUROXIME AXETIL 250 MG
1500 TABLET ORAL EVERY 8 HOURS
Refills: 0 | Status: COMPLETED | OUTPATIENT
Start: 2023-05-19 | End: 2023-05-20

## 2023-05-19 RX ORDER — CHOLECALCIFEROL (VITAMIN D3) 125 MCG
400 CAPSULE ORAL AT BEDTIME
Refills: 0 | Status: DISCONTINUED | OUTPATIENT
Start: 2023-05-19 | End: 2023-05-22

## 2023-05-19 RX ORDER — ACETAMINOPHEN 500 MG
1000 TABLET ORAL ONCE
Refills: 0 | Status: COMPLETED | OUTPATIENT
Start: 2023-05-19 | End: 2023-05-19

## 2023-05-19 RX ORDER — FUROSEMIDE 40 MG
40 TABLET ORAL EVERY 12 HOURS
Refills: 0 | Status: DISCONTINUED | OUTPATIENT
Start: 2023-05-19 | End: 2023-05-22

## 2023-05-19 RX ADMIN — Medication 40 MILLIGRAM(S): at 17:39

## 2023-05-19 RX ADMIN — SODIUM CHLORIDE 3 MILLILITER(S): 9 INJECTION INTRAMUSCULAR; INTRAVENOUS; SUBCUTANEOUS at 05:00

## 2023-05-19 RX ADMIN — SPIRONOLACTONE 25 MILLIGRAM(S): 25 TABLET, FILM COATED ORAL at 06:12

## 2023-05-19 RX ADMIN — Medication 40 MILLIGRAM(S): at 06:13

## 2023-05-19 RX ADMIN — Medication 400 UNIT(S): at 21:23

## 2023-05-19 RX ADMIN — Medication 100 MILLIGRAM(S): at 17:35

## 2023-05-19 RX ADMIN — Medication 400 MILLIGRAM(S): at 11:55

## 2023-05-19 RX ADMIN — Medication 1000 MILLIGRAM(S): at 12:41

## 2023-05-19 RX ADMIN — Medication 100 MILLIGRAM(S): at 06:12

## 2023-05-19 NOTE — CHART NOTE - NSCHARTNOTEFT_GEN_A_CORE
Patient is a 53F allergic to Keflex and Strawberries, PMH Afib on Coumadin (last dose 5/15/23 7.5mg) developmental delay, HTN, Anemia, Graves DX, Hypothyroid, Anemia , MV repair  TV replacement 2015 @ [place] who now presents for increased weight gain over last 6 weeks and shortness of breath in the setting of severe tricuspid regurgitation and right sided heart failure prior to Tricuspid Valve in Valve procedure tentatively planned for Friday of this week.  Currently patient denies chest pain.  +WHITE.  No shortness of breath while laying in bed and is able to speak in full sentences.   5/19- 29 m Bhavana Tricuspid valve in valve, RFV-Sutured to be removed in AM, Left radial Daisy removed, ABTx x 2 doses.     Right groin with hematoma  Compressed for 20 minutes  Hematoma subsided  Tylenol IV x 1 given  Pt stable

## 2023-05-19 NOTE — PROGRESS NOTE ADULT - ASSESSMENT
53F PMH Afib on Coumadin (last dose 5/15/23 7.5mg) developmental delay, HTN, Anemia, Graves DX, Hypothyroid, Anemia, MV repair  TV replacement 2015 @ [place] who now presents for increased weight gain over last 6 weeks and shortness of breath in the setting of severe tricuspid regurgitation and right sided heart failure prior to Tricuspid Valve in Valve procedure tentatively planned for Friday of this week.  Currently patient denies chest pain.  +WHITE.  No shortness of breath while laying in bed and is able to speak in full sentences.    Hospital Course:   5/17 TSH 33 > nml T4  H. Endo consulted --> Started on synthroid 112 mcg IV.   5/18 VVS; OR deferred until next week 2/2 hypothyroid, TSH 33.  Endocrine following.  INR 2.10 --> Will start heparin gtt once INR is less than 2.  Structural Team following.    Patient cleared by Endo for OR in AM.   Will proceed with surgery in AM.  NPO after midnight.     5/19 - 29 m Bhavana Tricuspid valve in valve, RFV-Sutured to be removed in AM, Recovered in PACU - compression of right groin for hematoma stabilized transferred to floor ABX x2- Toprol  in am  Coumadin on hold disposition to home

## 2023-05-19 NOTE — PROGRESS NOTE ADULT - ASSESSMENT
patient presently is quite stable status post tricuspid valve in valve for severe tricuspid insufficiency.  She is hemodynamically stable and awake and alert.      Recommend   continue as per structural heart team   thyroid medications as per endocrine   would place back on Lasix p.o. and replace potassium   discharge planning as per structural heart team/cardiac surgery

## 2023-05-19 NOTE — CHART NOTE - NSCHARTNOTEFT_GEN_A_CORE
Endocrine Follow up   53F PMH Afib on Coumadin (last dose 5/15/23 7.5mg) developmental delay, HTN, Anemia, Graves DX, Hypothyroid, Anemia , MV repair  TV replacement 2015 @ [place] who now presents for increased weight gain over last 6 weeks and shortness of breath in the setting of severe tricuspid regurgitation and right sided heart failure prior to Tricuspid Valve in Valve procedure tentatively planned for Friday of this week.  Endocrinology consulted for elevated tsh iso hypothyroidism.     Patient currently in OR     #Elevated TSH   #Hypothyroidism  #Hx of Graves dx   - TSH 31, elevated; FT4 wnl at 1.5 on admission   - Presenting with severe TR & right sided heart failure   - hyponatremia - low suspicion that this is 2/2 hypothyroidism given free T4 wnl, however this could be due to volume overload iso CHF   - CHF may also be causing decreased GI absorption of thyroid hormone replacement   - Currently on LT4 112mcg IV daily here.   - Patient's thyroid hormone levels do not suggest patient should have important tricuspid procedure delayed. Primary team can proceed with surgery as planned.     Recommendations:   - continue LT4 112mcg IV daily while inpatient   - TSH response/improvement will however lag behind, FT4 wnl   - Repeat TSH, FT4, TT3  - Will follow up when repeat TFTs available.     Heather Salazar DO  PGY2, Internal Medicine  Discussed with Attending Micah Zacarias     For follow up questions, discharge recommendations, or new consults please call answering service at [phone] (weekdays), [phone] (nights/weekends). For nonurgent matters, please email [email or web address] or [email or web address]. Endocrine Follow up   53F PMH Afib on Coumadin (last dose 5/15/23 7.5mg) developmental delay, HTN, Anemia, Graves DX, Hypothyroid, Anemia , MV repair  TV replacement 2015 @ [place] who now presents for increased weight gain over last 6 weeks and shortness of breath in the setting of severe tricuspid regurgitation and right sided heart failure prior to Tricuspid Valve in Valve procedure planned for Today. Endocrinology following for elevated tsh iso hypothyroidism.     Patient currently in OR, unable to examine patient. No repeat TFTs available.     #Elevated TSH   #Hypothyroidism  #Hx of Graves dx   - TSH 31, elevated; FT4 wnl at 1.5 on admission   - Presenting with severe TR & right sided heart failure   - hyponatremia - low suspicion that this is 2/2 hypothyroidism given free T4 wnl, however this could be due to volume overload iso CHF   - CHF may also be causing decreased GI absorption of thyroid hormone replacement   - Currently on LT4 112mcg IV daily here.   - Patient's thyroid hormone levels do not suggest patient should have important tricuspid procedure delayed. Primary team can proceed with surgery as planned.     Recommendations:   - continue LT4 112mcg IV daily while inpatient   - TSH response/improvement will however lag behind, FT4 wnl   - Repeat TSH, FT4, TT3  - Will follow up when repeat TFTs available.     Heather Salazar DO  PGY2, Internal Medicine  Discussed with Attending Micah Zacarias     For follow up questions, discharge recommendations, or new consults please call answering service at [phone] (weekdays), [phone] (nights/weekends). For nonurgent matters, please email [email or web address] or [email or web address]. Endocrine Follow up   53F PMH Afib on Coumadin (last dose 5/15/23 7.5mg) developmental delay, HTN, Anemia, Graves DX, Hypothyroid, Anemia , MV repair  TV replacement 2015 @ [place] who now presents for increased weight gain over last 6 weeks and shortness of breath in the setting of severe tricuspid regurgitation and right sided heart failure prior to Tricuspid Valve in Valve procedure planned for Today. Endocrinology following for elevated tsh iso hypothyroidism.     Patient currently in OR, unable to examine patient. No repeat TFTs available.     #Elevated TSH   #Hypothyroidism  #Hx of Graves dx   - TSH 31, elevated; FT4 wnl at 1.5 on admission   - Presenting with severe TR & right sided heart failure   - hyponatremia - low suspicion that this is 2/2 hypothyroidism given free T4 wnl, however this could be due to volume overload iso CHF   - CHF may also be causing decreased GI absorption of thyroid hormone replacement   - Currently on LT4 112mcg IV daily here.   - Patient's thyroid hormone levels do not suggest patient should have important tricuspid procedure delayed. Primary team can proceed with surgery as planned.     Recommendations:   - continue LT4 112mcg IV daily while inpatient   - TSH response/improvement will however lag behind, FT4 wnl   - Repeat TSH, FT4, TT3  - Will follow up when repeat TFTs available with further adjustments as needed.    Heather Salazar DO  PGY2, Internal Medicine  Discussed with Attending Micah Zacarias     For follow up questions, discharge recommendations, or new consults please call answering service at [phone] (weekdays), [phone] (nights/weekends). For nonurgent matters, please email [email or web address] or [email or web address].

## 2023-05-20 LAB
ALBUMIN SERPL ELPH-MCNC: 4.5 G/DL — SIGNIFICANT CHANGE UP (ref 3.3–5)
ALP SERPL-CCNC: 72 U/L — SIGNIFICANT CHANGE UP (ref 40–120)
ALT FLD-CCNC: 14 U/L — SIGNIFICANT CHANGE UP (ref 10–45)
ANION GAP SERPL CALC-SCNC: 13 MMOL/L — SIGNIFICANT CHANGE UP (ref 5–17)
APTT BLD: 34.5 SEC — SIGNIFICANT CHANGE UP (ref 27.5–35.5)
AST SERPL-CCNC: 22 U/L — SIGNIFICANT CHANGE UP (ref 10–40)
BILIRUB SERPL-MCNC: 4.5 MG/DL — HIGH (ref 0.2–1.2)
BUN SERPL-MCNC: 22 MG/DL — SIGNIFICANT CHANGE UP (ref 7–23)
CALCIUM SERPL-MCNC: 9 MG/DL — SIGNIFICANT CHANGE UP (ref 8.4–10.5)
CHLORIDE SERPL-SCNC: 94 MMOL/L — LOW (ref 96–108)
CO2 SERPL-SCNC: 27 MMOL/L — SIGNIFICANT CHANGE UP (ref 22–31)
CREAT SERPL-MCNC: 0.8 MG/DL — SIGNIFICANT CHANGE UP (ref 0.5–1.3)
EGFR: 88 ML/MIN/1.73M2 — SIGNIFICANT CHANGE UP
GLUCOSE SERPL-MCNC: 143 MG/DL — HIGH (ref 70–99)
HCT VFR BLD CALC: 37 % — SIGNIFICANT CHANGE UP (ref 34.5–45)
HGB BLD-MCNC: 12.5 G/DL — SIGNIFICANT CHANGE UP (ref 11.5–15.5)
INR BLD: 1.45 RATIO — HIGH (ref 0.88–1.16)
MCHC RBC-ENTMCNC: 33.8 GM/DL — SIGNIFICANT CHANGE UP (ref 32–36)
MCHC RBC-ENTMCNC: 33.9 PG — SIGNIFICANT CHANGE UP (ref 27–34)
MCV RBC AUTO: 100.3 FL — HIGH (ref 80–100)
NRBC # BLD: 0 /100 WBCS — SIGNIFICANT CHANGE UP (ref 0–0)
PLATELET # BLD AUTO: 95 K/UL — LOW (ref 150–400)
POTASSIUM SERPL-MCNC: 4 MMOL/L — SIGNIFICANT CHANGE UP (ref 3.5–5.3)
POTASSIUM SERPL-SCNC: 4 MMOL/L — SIGNIFICANT CHANGE UP (ref 3.5–5.3)
PROT SERPL-MCNC: 6.8 G/DL — SIGNIFICANT CHANGE UP (ref 6–8.3)
PROTHROM AB SERPL-ACNC: 16.9 SEC — HIGH (ref 10.5–13.4)
RBC # BLD: 3.69 M/UL — LOW (ref 3.8–5.2)
RBC # FLD: 14.7 % — HIGH (ref 10.3–14.5)
SODIUM SERPL-SCNC: 134 MMOL/L — LOW (ref 135–145)
T3 SERPL-MCNC: 23 NG/DL — LOW (ref 80–200)
T4 FREE SERPL-MCNC: 1.5 NG/DL — SIGNIFICANT CHANGE UP (ref 0.9–1.8)
TSH SERPL-MCNC: 5.18 UIU/ML — HIGH (ref 0.27–4.2)
WBC # BLD: 7.78 K/UL — SIGNIFICANT CHANGE UP (ref 3.8–10.5)
WBC # FLD AUTO: 7.78 K/UL — SIGNIFICANT CHANGE UP (ref 3.8–10.5)

## 2023-05-20 PROCEDURE — 99232 SBSQ HOSP IP/OBS MODERATE 35: CPT

## 2023-05-20 PROCEDURE — 93010 ELECTROCARDIOGRAM REPORT: CPT

## 2023-05-20 PROCEDURE — 76376 3D RENDER W/INTRP POSTPROCES: CPT | Mod: 26

## 2023-05-20 PROCEDURE — 93306 TTE W/DOPPLER COMPLETE: CPT | Mod: 26

## 2023-05-20 RX ORDER — METOPROLOL TARTRATE 50 MG
100 TABLET ORAL DAILY
Refills: 0 | Status: DISCONTINUED | OUTPATIENT
Start: 2023-05-20 | End: 2023-05-22

## 2023-05-20 RX ORDER — LEVOTHYROXINE SODIUM 125 MCG
90 TABLET ORAL
Refills: 0 | Status: DISCONTINUED | OUTPATIENT
Start: 2023-05-20 | End: 2023-05-22

## 2023-05-20 RX ORDER — WARFARIN SODIUM 2.5 MG/1
7.5 TABLET ORAL ONCE
Refills: 0 | Status: COMPLETED | OUTPATIENT
Start: 2023-05-20 | End: 2023-05-20

## 2023-05-20 RX ADMIN — Medication 40 MILLIGRAM(S): at 17:34

## 2023-05-20 RX ADMIN — Medication 81 MILLIGRAM(S): at 12:36

## 2023-05-20 RX ADMIN — Medication 100 MILLIGRAM(S): at 17:34

## 2023-05-20 RX ADMIN — Medication 40 MILLIGRAM(S): at 05:32

## 2023-05-20 RX ADMIN — Medication 100 MILLIGRAM(S): at 00:05

## 2023-05-20 RX ADMIN — SPIRONOLACTONE 25 MILLIGRAM(S): 25 TABLET, FILM COATED ORAL at 05:32

## 2023-05-20 RX ADMIN — Medication 1 TABLET(S): at 12:36

## 2023-05-20 RX ADMIN — WARFARIN SODIUM 7.5 MILLIGRAM(S): 2.5 TABLET ORAL at 21:25

## 2023-05-20 RX ADMIN — Medication 112 MICROGRAM(S): at 12:37

## 2023-05-20 RX ADMIN — Medication 400 UNIT(S): at 21:08

## 2023-05-20 NOTE — PROGRESS NOTE ADULT - ASSESSMENT
53F PMH Afib on Coumadin (last dose 5/15/23 7.5mg) developmental delay, HTN, Anemia, Graves DX, Hypothyroid, Anemia, MV repair  TV replacement 2015 @ [place] who now presents for increased weight gain over last 6 weeks and shortness of breath in the setting of severe tricuspid regurgitation and right sided heart failure prior to Tricuspid Valve in Valve procedure tentatively planned for Friday of this week.  Currently patient denies chest pain.  +WHITE.  No shortness of breath while laying in bed and is able to speak in full sentences.    Hospital Course:   5/17 TSH 33 > nml T4  H. Endo consulted --> Started on synthroid 112 mcg IV.   5/18 VVS; OR deferred until next week 2/2 hypothyroid, TSH 33.  Endocrine following.  INR 2.10 --> Will start heparin gtt once INR is less than 2.  Structural Team following.    Patient cleared by Endo for OR in AM.   Will proceed with surgery in AM.  NPO after midnight.     5/19 - 29 m Bhavana Tricuspid valve in valve, RFV-Sutured to be removed in AM, Recovered in PACU - compression of right groin for hematoma stabilized transferred to floor ABX x2- Toprol  in am  Coumadin on hold disposition to home  5/20 VSS  afib 77 on coumadin INR 1.45.  2 groin sutures+ ecchymosis -stable from last night.  toprol resumed.  +370cc/24hrs on lasix 40 bid/ald 25.  TTE reviewed by Dr Hess no valvular leak

## 2023-05-20 NOTE — PROGRESS NOTE ADULT - ASSESSMENT
53F PMH Afib on Coumadin (last dose 5/15/23 7.5mg) developmental delay, HTN, Anemia, Graves DX, Hypothyroid, Anemia , MV repair  TV replacement 2015 @ [place] who now presented for increased weight gain over last 6 weeks and shortness of breath in the setting of severe tricuspid regurgitation and right sided heart failure prior to Tricuspid Valve in Valve procedure tentatively planned for Friday of this week.  Endocrinology consulted for elevated tsh iso hypothyroidism.     #Elevated TSH   #Hypothyroidism  #Hx of Graves dx   Initially TSH 31, elevated; FT4 wnl at 1.5   severe TR & right sided heart failure   CHF likely causing decreased GI absorption of thyroid hormone replacement   has been having recent fluctuation of tfts over the last 3-4 months  was previously on stable LT4 dose of 137mcg daily   Now on Tirosint 150mcg daily; patient and mother report compliance, taking correctly   follows with Dr. Pebbles Coello   TSH has significantly decreased over the past 4 days from 30 to 5.   Recommendations:   -Empirically decrease IV levothyroxine to 90 mcg daily given rapid decrease in TSH indicating 112 mcg is likely too high of a dose for her now that she is absorbing the medication.   - Repeat TSH, FT4, TT3 on 5/26 if still inpatient.  DC planning: likely back on home Tirosint at 125 mcg daily at lower dose given improvement in edema and CHF s/p valve surgery. Outpatient follow up with her private endocrinologist Dr. Pebbles Coello.     #osteoporosis   - on fosamax as outpatient   - continue outpatient f/u   - fall precautions     Prediabetes  A1c 6.1.  BG well controlled  F/u as outpatient

## 2023-05-21 LAB
ANION GAP SERPL CALC-SCNC: 15 MMOL/L — SIGNIFICANT CHANGE UP (ref 5–17)
BUN SERPL-MCNC: 24 MG/DL — HIGH (ref 7–23)
CALCIUM SERPL-MCNC: 8.8 MG/DL — SIGNIFICANT CHANGE UP (ref 8.4–10.5)
CHLORIDE SERPL-SCNC: 92 MMOL/L — LOW (ref 96–108)
CO2 SERPL-SCNC: 27 MMOL/L — SIGNIFICANT CHANGE UP (ref 22–31)
CREAT SERPL-MCNC: 0.83 MG/DL — SIGNIFICANT CHANGE UP (ref 0.5–1.3)
EGFR: 84 ML/MIN/1.73M2 — SIGNIFICANT CHANGE UP
GLUCOSE SERPL-MCNC: 121 MG/DL — HIGH (ref 70–99)
HCT VFR BLD CALC: 36.9 % — SIGNIFICANT CHANGE UP (ref 34.5–45)
HGB BLD-MCNC: 12.8 G/DL — SIGNIFICANT CHANGE UP (ref 11.5–15.5)
INR BLD: 1.52 RATIO — HIGH (ref 0.88–1.16)
MCHC RBC-ENTMCNC: 34.7 GM/DL — SIGNIFICANT CHANGE UP (ref 32–36)
MCHC RBC-ENTMCNC: 34.7 PG — HIGH (ref 27–34)
MCV RBC AUTO: 100 FL — SIGNIFICANT CHANGE UP (ref 80–100)
NRBC # BLD: 0 /100 WBCS — SIGNIFICANT CHANGE UP (ref 0–0)
PLATELET # BLD AUTO: 99 K/UL — LOW (ref 150–400)
POTASSIUM SERPL-MCNC: 3.5 MMOL/L — SIGNIFICANT CHANGE UP (ref 3.5–5.3)
POTASSIUM SERPL-SCNC: 3.5 MMOL/L — SIGNIFICANT CHANGE UP (ref 3.5–5.3)
PROTHROM AB SERPL-ACNC: 17.6 SEC — HIGH (ref 10.5–13.4)
RBC # BLD: 3.69 M/UL — LOW (ref 3.8–5.2)
RBC # FLD: 14.7 % — HIGH (ref 10.3–14.5)
SODIUM SERPL-SCNC: 134 MMOL/L — LOW (ref 135–145)
WBC # BLD: 7.83 K/UL — SIGNIFICANT CHANGE UP (ref 3.8–10.5)
WBC # FLD AUTO: 7.83 K/UL — SIGNIFICANT CHANGE UP (ref 3.8–10.5)

## 2023-05-21 PROCEDURE — 93010 ELECTROCARDIOGRAM REPORT: CPT

## 2023-05-21 PROCEDURE — 99232 SBSQ HOSP IP/OBS MODERATE 35: CPT

## 2023-05-21 RX ORDER — POTASSIUM CHLORIDE 20 MEQ
20 PACKET (EA) ORAL
Refills: 0 | Status: COMPLETED | OUTPATIENT
Start: 2023-05-21 | End: 2023-05-21

## 2023-05-21 RX ORDER — WARFARIN SODIUM 2.5 MG/1
7.5 TABLET ORAL ONCE
Refills: 0 | Status: COMPLETED | OUTPATIENT
Start: 2023-05-21 | End: 2023-05-21

## 2023-05-21 RX ORDER — LISINOPRIL 2.5 MG/1
10 TABLET ORAL DAILY
Refills: 0 | Status: DISCONTINUED | OUTPATIENT
Start: 2023-05-21 | End: 2023-05-22

## 2023-05-21 RX ADMIN — Medication 81 MILLIGRAM(S): at 11:56

## 2023-05-21 RX ADMIN — SPIRONOLACTONE 25 MILLIGRAM(S): 25 TABLET, FILM COATED ORAL at 05:16

## 2023-05-21 RX ADMIN — Medication 40 MILLIGRAM(S): at 05:16

## 2023-05-21 RX ADMIN — Medication 1 TABLET(S): at 11:56

## 2023-05-21 RX ADMIN — Medication 90 MICROGRAM(S): at 10:49

## 2023-05-21 RX ADMIN — Medication 20 MILLIEQUIVALENT(S): at 14:02

## 2023-05-21 RX ADMIN — Medication 40 MILLIGRAM(S): at 17:03

## 2023-05-21 RX ADMIN — Medication 400 UNIT(S): at 21:45

## 2023-05-21 RX ADMIN — WARFARIN SODIUM 7.5 MILLIGRAM(S): 2.5 TABLET ORAL at 21:45

## 2023-05-21 RX ADMIN — LISINOPRIL 10 MILLIGRAM(S): 2.5 TABLET ORAL at 16:34

## 2023-05-21 RX ADMIN — Medication 20 MILLIEQUIVALENT(S): at 12:18

## 2023-05-21 RX ADMIN — Medication 100 MILLIGRAM(S): at 05:16

## 2023-05-21 NOTE — PROGRESS NOTE ADULT - PROBLEM SELECTOR PLAN 3
on Tirosint 150mcg QD at home as of 3 weeks ago> reports compliance  TSH 33 this am  House Endo consulted> await attending to weigh in on recs>spoke w/ fellow Mandie  Anesthesia will not clear for OR with TSH elevated
on Tirosint 150mcg QD at home as of 3 weeks ago> reports compliance  House Endo following cleared to proceed with surgery   Continue on IV Synthroid 112 mcg
on Tirosint 150mcg QD at home as of 3 weeks ago> reports compliance  House Endo following cleared to proceed with surgery
on Tirosint 150mcg QD at home as of 3 weeks ago> reports compliance  House Endo following cleared to proceed with surgery
on Tirosint 150mcg QD at home as of 3 weeks ago> reports compliance  House Endo following cleared to proceed with surgery   Continue on IV Synthroid 112 mcg
Warfarin on hold pre-op, INR 2.1  Rate controlled 65-80 on tele   Continue Metoprolol
on Tirosint 150mcg QD at home as of 3 weeks ago> reports compliance  TSH 33 this am  House Endo consulted> await attending to weigh in on recs>spoke w/ fellow Mandie  Anesthesia will not clear for OR with TSH elevated

## 2023-05-21 NOTE — PROGRESS NOTE ADULT - PROBLEM SELECTOR PROBLEM 3
Hypothyroid
Hypothyroid
Postablative hypothyroidism
Hypothyroid
Postablative hypothyroidism
Chronic atrial fibrillation
Hypothyroid
Spine appears normal, range of motion is not limited, no muscle or joint tenderness

## 2023-05-21 NOTE — PROGRESS NOTE ADULT - ASSESSMENT
53F PMH Afib on Coumadin (last dose 5/15/23 7.5mg) developmental delay, HTN, Anemia, Graves DX, Hypothyroid, Anemia, MV repair  TV replacement 2015 @ [place] who now presents for increased weight gain over last 6 weeks and shortness of breath in the setting of severe tricuspid regurgitation and right sided heart failure prior to Tricuspid Valve in Valve procedure tentatively planned for Friday of this week.  Currently patient denies chest pain.  +WHITE.  No shortness of breath while laying in bed and is able to speak in full sentences.    Hospital Course:   5/17 TSH 33 > nml T4  H. Endo consulted --> Started on synthroid 112 mcg IV.   5/18 VVS; OR deferred until next week 2/2 hypothyroid, TSH 33.  Endocrine following.  INR 2.10 --> Will start heparin gtt once INR is less than 2.  Structural Team following.    Patient cleared by Endo for OR in AM.   Will proceed with surgery in AM.  NPO after midnight.     5/19 - 29 m Bhavana Tricuspid valve in valve, RFV-Sutured to be removed in AM, Recovered in PACU - compression of right groin for hematoma stabilized transferred to floor ABX x2- Toprol  in am  Coumadin on hold disposition to home  5/20 VSS  afib 77 on coumadin INR 1.45.  2 groin sutures+ ecchymosis -stable from last night.  toprol resumed.  +370cc/24hrs on lasix 40 bid/ald 25.  TTE reviewed by Dr Hess no valvular leak  5/21: VSS, INR 1.5 cont Coumadin 7.5 mg every night, Groin sutures removed, + ecchymosis stable, May resume 1/2 dose of ramipril if BP stable

## 2023-05-22 ENCOUNTER — TRANSCRIPTION ENCOUNTER (OUTPATIENT)
Age: 53
End: 2023-05-22

## 2023-05-22 VITALS
TEMPERATURE: 98 F | SYSTOLIC BLOOD PRESSURE: 104 MMHG | OXYGEN SATURATION: 100 % | DIASTOLIC BLOOD PRESSURE: 73 MMHG | HEART RATE: 81 BPM | RESPIRATION RATE: 18 BRPM

## 2023-05-22 PROBLEM — E03.9 HYPOTHYROIDISM, UNSPECIFIED: Chronic | Status: ACTIVE | Noted: 2023-05-16

## 2023-05-22 PROBLEM — I48.20 CHRONIC ATRIAL FIBRILLATION, UNSPECIFIED: Chronic | Status: ACTIVE | Noted: 2023-05-16

## 2023-05-22 PROBLEM — I07.1 RHEUMATIC TRICUSPID INSUFFICIENCY: Chronic | Status: ACTIVE | Noted: 2023-05-16

## 2023-05-22 PROBLEM — D64.9 ANEMIA, UNSPECIFIED: Chronic | Status: ACTIVE | Noted: 2023-05-16

## 2023-05-22 PROBLEM — I10 ESSENTIAL (PRIMARY) HYPERTENSION: Chronic | Status: ACTIVE | Noted: 2023-05-16

## 2023-05-22 LAB
ANION GAP SERPL CALC-SCNC: 12 MMOL/L — SIGNIFICANT CHANGE UP (ref 5–17)
APTT BLD: 32.7 SEC — SIGNIFICANT CHANGE UP (ref 27.5–35.5)
BUN SERPL-MCNC: 25 MG/DL — HIGH (ref 7–23)
CALCIUM SERPL-MCNC: 8.7 MG/DL — SIGNIFICANT CHANGE UP (ref 8.4–10.5)
CHLORIDE SERPL-SCNC: 91 MMOL/L — LOW (ref 96–108)
CO2 SERPL-SCNC: 29 MMOL/L — SIGNIFICANT CHANGE UP (ref 22–31)
CREAT SERPL-MCNC: 0.78 MG/DL — SIGNIFICANT CHANGE UP (ref 0.5–1.3)
EGFR: 90 ML/MIN/1.73M2 — SIGNIFICANT CHANGE UP
GLUCOSE SERPL-MCNC: 106 MG/DL — HIGH (ref 70–99)
HCT VFR BLD CALC: 34.3 % — LOW (ref 34.5–45)
HGB BLD-MCNC: 11.7 G/DL — SIGNIFICANT CHANGE UP (ref 11.5–15.5)
INR BLD: 1.45 RATIO — HIGH (ref 0.88–1.16)
MCHC RBC-ENTMCNC: 34.1 GM/DL — SIGNIFICANT CHANGE UP (ref 32–36)
MCHC RBC-ENTMCNC: 34.5 PG — HIGH (ref 27–34)
MCV RBC AUTO: 101.2 FL — HIGH (ref 80–100)
NRBC # BLD: 0 /100 WBCS — SIGNIFICANT CHANGE UP (ref 0–0)
PLATELET # BLD AUTO: 92 K/UL — LOW (ref 150–400)
POTASSIUM SERPL-MCNC: 3.8 MMOL/L — SIGNIFICANT CHANGE UP (ref 3.5–5.3)
POTASSIUM SERPL-SCNC: 3.8 MMOL/L — SIGNIFICANT CHANGE UP (ref 3.5–5.3)
PROTHROM AB SERPL-ACNC: 16.9 SEC — HIGH (ref 10.5–13.4)
RBC # BLD: 3.39 M/UL — LOW (ref 3.8–5.2)
RBC # FLD: 15.1 % — HIGH (ref 10.3–14.5)
SODIUM SERPL-SCNC: 132 MMOL/L — LOW (ref 135–145)
WBC # BLD: 6.13 K/UL — SIGNIFICANT CHANGE UP (ref 3.8–10.5)
WBC # FLD AUTO: 6.13 K/UL — SIGNIFICANT CHANGE UP (ref 3.8–10.5)

## 2023-05-22 PROCEDURE — 84443 ASSAY THYROID STIM HORMONE: CPT

## 2023-05-22 PROCEDURE — 76376 3D RENDER W/INTRP POSTPROCES: CPT

## 2023-05-22 PROCEDURE — 36415 COLL VENOUS BLD VENIPUNCTURE: CPT

## 2023-05-22 PROCEDURE — 81001 URINALYSIS AUTO W/SCOPE: CPT

## 2023-05-22 PROCEDURE — 85730 THROMBOPLASTIN TIME PARTIAL: CPT

## 2023-05-22 PROCEDURE — 84480 ASSAY TRIIODOTHYRONINE (T3): CPT

## 2023-05-22 PROCEDURE — C1766: CPT

## 2023-05-22 PROCEDURE — 93005 ELECTROCARDIOGRAM TRACING: CPT

## 2023-05-22 PROCEDURE — 85610 PROTHROMBIN TIME: CPT

## 2023-05-22 PROCEDURE — C9399: CPT

## 2023-05-22 PROCEDURE — C1887: CPT

## 2023-05-22 PROCEDURE — 83880 ASSAY OF NATRIURETIC PEPTIDE: CPT

## 2023-05-22 PROCEDURE — 87640 STAPH A DNA AMP PROBE: CPT

## 2023-05-22 PROCEDURE — 80048 BASIC METABOLIC PNL TOTAL CA: CPT

## 2023-05-22 PROCEDURE — 83036 HEMOGLOBIN GLYCOSYLATED A1C: CPT

## 2023-05-22 PROCEDURE — 85027 COMPLETE CBC AUTOMATED: CPT

## 2023-05-22 PROCEDURE — 86901 BLOOD TYPING SEROLOGIC RH(D): CPT

## 2023-05-22 PROCEDURE — 86900 BLOOD TYPING SEROLOGIC ABO: CPT

## 2023-05-22 PROCEDURE — 84702 CHORIONIC GONADOTROPIN TEST: CPT

## 2023-05-22 PROCEDURE — 93306 TTE W/DOPPLER COMPLETE: CPT

## 2023-05-22 PROCEDURE — 85384 FIBRINOGEN ACTIVITY: CPT

## 2023-05-22 PROCEDURE — C1894: CPT

## 2023-05-22 PROCEDURE — 94010 BREATHING CAPACITY TEST: CPT

## 2023-05-22 PROCEDURE — 86923 COMPATIBILITY TEST ELECTRIC: CPT

## 2023-05-22 PROCEDURE — L8699: CPT

## 2023-05-22 PROCEDURE — 99238 HOSP IP/OBS DSCHRG MGMT 30/<: CPT

## 2023-05-22 PROCEDURE — 84439 ASSAY OF FREE THYROXINE: CPT

## 2023-05-22 PROCEDURE — 71045 X-RAY EXAM CHEST 1 VIEW: CPT

## 2023-05-22 PROCEDURE — 93355 ECHO TRANSESOPHAGEAL (TEE): CPT

## 2023-05-22 PROCEDURE — 87641 MR-STAPH DNA AMP PROBE: CPT

## 2023-05-22 PROCEDURE — C1769: CPT

## 2023-05-22 PROCEDURE — 86850 RBC ANTIBODY SCREEN: CPT

## 2023-05-22 PROCEDURE — 80053 COMPREHEN METABOLIC PANEL: CPT

## 2023-05-22 PROCEDURE — 85576 BLOOD PLATELET AGGREGATION: CPT

## 2023-05-22 PROCEDURE — 93010 ELECTROCARDIOGRAM REPORT: CPT

## 2023-05-22 PROCEDURE — 76000 FLUOROSCOPY <1 HR PHYS/QHP: CPT

## 2023-05-22 PROCEDURE — 85025 COMPLETE CBC W/AUTO DIFF WBC: CPT

## 2023-05-22 RX ORDER — ASPIRIN/CALCIUM CARB/MAGNESIUM 324 MG
1 TABLET ORAL
Qty: 30 | Refills: 0
Start: 2023-05-22 | End: 2023-06-20

## 2023-05-22 RX ORDER — METOPROLOL TARTRATE 50 MG
1 TABLET ORAL
Qty: 30 | Refills: 0
Start: 2023-05-22 | End: 2023-06-20

## 2023-05-22 RX ORDER — FUROSEMIDE 40 MG
40 TABLET ORAL DAILY
Refills: 0 | Status: DISCONTINUED | OUTPATIENT
Start: 2023-05-23 | End: 2023-05-22

## 2023-05-22 RX ORDER — WARFARIN SODIUM 2.5 MG/1
1 TABLET ORAL
Qty: 30 | Refills: 0
Start: 2023-05-22 | End: 2023-06-20

## 2023-05-22 RX ORDER — FUROSEMIDE 40 MG
1 TABLET ORAL
Qty: 30 | Refills: 0
Start: 2023-05-22 | End: 2023-06-20

## 2023-05-22 RX ORDER — LEVOTHYROXINE SODIUM 125 MCG
1 TABLET ORAL
Qty: 30 | Refills: 0
Start: 2023-05-22 | End: 2023-06-20

## 2023-05-22 RX ORDER — METOPROLOL TARTRATE 50 MG
50 TABLET ORAL DAILY
Refills: 0 | Status: DISCONTINUED | OUTPATIENT
Start: 2023-05-23 | End: 2023-05-22

## 2023-05-22 RX ORDER — CHOLECALCIFEROL (VITAMIN D3) 125 MCG
400 CAPSULE ORAL
Qty: 0 | Refills: 0 | DISCHARGE
Start: 2023-05-22

## 2023-05-22 RX ORDER — SPIRONOLACTONE 25 MG/1
1 TABLET, FILM COATED ORAL
Qty: 30 | Refills: 0
Start: 2023-05-22 | End: 2023-06-20

## 2023-05-22 RX ORDER — POTASSIUM CHLORIDE 20 MEQ
20 PACKET (EA) ORAL ONCE
Refills: 0 | Status: COMPLETED | OUTPATIENT
Start: 2023-05-22 | End: 2023-05-22

## 2023-05-22 RX ADMIN — Medication 81 MILLIGRAM(S): at 12:00

## 2023-05-22 RX ADMIN — Medication 90 MICROGRAM(S): at 14:14

## 2023-05-22 RX ADMIN — Medication 1 TABLET(S): at 12:00

## 2023-05-22 RX ADMIN — Medication 20 MILLIEQUIVALENT(S): at 12:00

## 2023-05-22 RX ADMIN — Medication 40 MILLIGRAM(S): at 05:41

## 2023-05-22 RX ADMIN — SPIRONOLACTONE 25 MILLIGRAM(S): 25 TABLET, FILM COATED ORAL at 12:00

## 2023-05-22 NOTE — PROGRESS NOTE ADULT - PROVIDER SPECIALTY LIST ADULT
Endocrinology
Cardiology
Structural Heart
CT Surgery
Structural Heart
CT Surgery
Endocrinology
CT Surgery
CT Surgery

## 2023-05-22 NOTE — DISCHARGE NOTE PROVIDER - NSDCCPCAREPLAN_GEN_ALL_CORE_FT
PRINCIPAL DISCHARGE DIAGNOSIS  Diagnosis: Severe tricuspid regurgitation  Assessment and Plan of Treatment: s/p 5/19/23 Tricuspid valve in valve  shower daily  weigh yourself daily  continue current prescriptions as ordered  increase activity as tolerated   no added salt; low fat; low cholesterol, low salt diet.   follow up with Cardiologist, Dr. Alden Gale' for coumadin bloodwork/ dosing starting thursday May 25th  follow up with cardiac surgeon, Dr. farmer on May 26th at 11:00 am; Call to confirm appointment. [phone]  follow up echocardiogram in 30 days at Dr. Farmer's office   follow up with endocrinologist, Dr. Coello in 2 weeks; Call to schedule appointment.

## 2023-05-22 NOTE — DISCHARGE NOTE NURSING/CASE MANAGEMENT/SOCIAL WORK - PATIENT PORTAL LINK FT
You can access the FollowMyHealth Patient Portal offered by [facility] by registering at the following website: [email or web address]. By joining [facility]’s FollowMyHealth portal, you will also be able to view your health information using other applications (apps) compatible with our system.

## 2023-05-22 NOTE — DIETITIAN INITIAL EVALUATION ADULT - PERTINENT LABORATORY DATA
05-22    132<L>  |  91<L>  |  25<H>  ----------------------------<  106<H>  3.8   |  29  |  0.78    Ca    8.7      22 May 2023 06:32    A1C with Estimated Average Glucose Result: 6.1 % (05-16-23 @ 17:09)

## 2023-05-22 NOTE — PROGRESS NOTE ADULT - PROBLEM SELECTOR PROBLEM 2
Hypothyroid
Chronic atrial fibrillation
Chronic atrial fibrillation
History of Graves' disease
History of Graves' disease
Chronic atrial fibrillation

## 2023-05-22 NOTE — DISCHARGE NOTE PROVIDER - NSDCMRMEDTOKEN_GEN_ALL_CORE_FT
Respiratory
aspirin 81 mg oral delayed release tablet: 1 tab(s) orally once a day  cholecalciferol oral tablet: 400 unit(s) orally once a day (at bedtime)  furosemide 40 mg oral tablet: 1 tab(s) orally once a day  metoprolol succinate 50 mg oral tablet, extended release: 1 tab(s) orally once a day  Multiple Vitamins oral tablet: 1 tab(s) orally once a day  spironolactone 25 mg oral tablet: 1 tab(s) orally once a day  Tirosint 125 mcg (0.125 mg) oral capsule: 1 cap(s) orally once a day  warfarin 7.5 mg oral tablet: 1 tab(s) orally once a day

## 2023-05-22 NOTE — DISCHARGE NOTE NURSING/CASE MANAGEMENT/SOCIAL WORK - NSDCPEFALRISK_GEN_ALL_CORE
For information on Fall & Injury Prevention, visit: [email or web address] OR  [email or web address] OR  https://www.cdc.gov/steadi/patient.html

## 2023-05-22 NOTE — DIETITIAN INITIAL EVALUATION ADULT - REASON FOR ADMISSION
53F PMH Afib on Coumadin (last dose 5/15/23 7.5mg) developmental delay, HTN, Anemia, Graves DX, Hypothyroid, Anemia, MV repair  TV replacement 2015 @ [place] who now presents for increased weight gain over last 6 weeks and shortness of breath in the setting of severe tricuspid regurgitation and right sided heart failure.

## 2023-05-22 NOTE — DISCHARGE NOTE PROVIDER - HOSPITAL COURSE
53F PMH Afib on Coumadin (last dose 5/15/23 7.5mg) developmental delay, HTN, Anemia, Graves DX, Hypothyroid, Anemia, MV repair  TV replacement 2015 @ [place] who now presents for increased weight gain over last 6 weeks and shortness of breath in the setting of severe tricuspid regurgitation and right sided heart failure prior to Tricuspid Valve in Valve procedure tentatively planned for Friday of this week.  Currently patient denies chest pain.  +WHITE.  No shortness of breath while laying in bed and is able to speak in full sentences.    Hospital Course:   5/17 TSH 33 > nml T4  H. Endo consulted --> Started on synthroid 112 mcg IV.   5/18 VVS; OR deferred until next week 2/2 hypothyroid, TSH 33.  Endocrine following.  INR 2.10 --> Will start heparin gtt once INR is less than 2.  Structural Team following.    Patient cleared by Endo for OR in AM.   Will proceed with surgery in AM.  NPO after midnight.     5/19 - 29 m Bhavana Tricuspid valve in valve, RFV-Sutured to be removed in AM, Recovered in PACU - compression of right groin for hematoma stabilized transferred to floor ABX x2- Toprol  in am  Coumadin on hold disposition to home  5/20 VSS  afib 77 on coumadin INR 1.45.  2 groin sutures+ ecchymosis -stable from last night.  toprol resumed.  +370cc/24hrs on lasix 40 bid/ald 25.  TTE reviewed by Dr Hess no valvular leak  5/21: VSS, INR 1.5 cont Coumadin 7.5 mg every night, Groin sutures removed, + ecchymosis stable, May resume 1/2 dose of ramipril if BP stable   5/22 + hypotension noted this am --pt asymptomatic- ace d/c; sbp now 100; decrease toprol 50 qd and lasix 40 mg poqd as per struct heart team; rt groin neg bleeding  Discharge planning- home today as per struct heart team and Dr. Farmer

## 2023-05-22 NOTE — PROGRESS NOTE ADULT - SUBJECTIVE AND OBJECTIVE BOX
Chief Complaint: Evaluating this 54 y/o F for hypothyroidism    Interval History: Feeling well without complaints at this time. TFTs have improved significantly over the past few days now with TSH around 5.     MEDICATIONS  (STANDING):  aspirin enteric coated 81 milliGRAM(s) Oral daily  cholecalciferol 400 Unit(s) Oral at bedtime  furosemide    Tablet 40 milliGRAM(s) Oral every 12 hours  levothyroxine Injectable 90 MICROGram(s) IV Push <User Schedule>  metoprolol succinate  milliGRAM(s) Oral daily  multivitamin 1 Tablet(s) Oral daily  spironolactone 25 milliGRAM(s) Oral daily  warfarin 7.5 milliGRAM(s) Oral once    MEDICATIONS  (PRN):      Allergies    Keflex (Unknown)  strawberry (Hives)    Intolerances      Review of Systems:  Constitutional: No fever  Eyes: No blurry vision  Cardiovascular: No chest pain  Respiratory: No SOB  GI: No abdominal pain, No nausea, No vomiting  Endocrine: as noted in HPI    All other negative      PHYSICAL EXAM:  VITALS: T(C): 36.9 (05-20-23 @ 12:17)  T(F): 98.5 (05-20-23 @ 12:17), Max: 98.5 (05-20-23 @ 12:17)  HR: 87 (05-20-23 @ 12:17) (70 - 87)  BP: 113/64 (05-20-23 @ 12:17) (101/66 - 113/64)  RR:  (18 - 18)  SpO2:  (92% - 97%)  Wt(kg): --  GENERAL: NAD at this time  EYES: EOMI, No proptosis  HEENT:  Atraumatic, Normocephalic,   RESPIRATORY: Clear to auscultation bilaterally, full excursion, non labored  CARDIOVASCULAR: Regular rhythm; normal S1/S2, no peripheral edema  GI: Soft, nontender, non distended, normal bowel sounds  SKIN: Warm and dry  PSYCH: normal affect, normal mood            05-20    134<L>  |  94<L>  |  22  ----------------------------<  143<H>  4.0   |  27  |  0.80    eGFR: 88    Ca    9.0      05-20    TPro  6.8  /  Alb  4.5  /  TBili  4.5<H>  /  DBili  x   /  AST  22  /  ALT  14  /  AlkPhos  72  05-20        Thyroid Function Tests:  05-20 @ 06:09 TSH 5.18 FreeT4 1.5 T3 23 Anti TPO -- Anti Thyroglobulin Ab -- TSI --  05-16 @ 17:08 TSH 31.00 FreeT4 1.5 T3 -- Anti TPO -- Anti Thyroglobulin Ab -- TSI --                      
  Subjective: Pt states "I'm ok " denies any CP, SOB, N/V and palpitations. No acute events overnight.     VITAL SIGNS    Telemetry:  AFIB  Vital Signs Last 24 Hrs  T(C): 36.6 (23 @ 12:09), Max: 36.7 (23 @ 04:47)  T(F): 97.8 (23 @ 12:09), Max: 98.1 (23 @ 04:47)  HR: 84 (23 @ 12:09) (82 - 98)  BP: 104/70 (23 @ 12:09) (103/63 - 112/64)  RR: 18 (23 @ 12:09) (18 - 18)  SpO2: 95% (23 @ 12:09) (94% - 97%)            0520 @ 07:01  -   @ 07:00  --------------------------------------------------------  IN: 740 mL / OUT: 900 mL / NET: -160 mL     @ 07:01  -   @ 13:51  --------------------------------------------------------  IN: 200 mL / OUT: 600 mL / NET: -400 mL       Daily     Daily Weight in k.4 (21 May 2023 07:49)    Drug Dosing Weight                MEDICATIONS  aspirin enteric coated 81 milliGRAM(s) Oral daily  cholecalciferol 400 Unit(s) Oral at bedtime  furosemide    Tablet 40 milliGRAM(s) Oral every 12 hours  levothyroxine Injectable 90 MICROGram(s) IV Push <User Schedule>  metoprolol succinate  milliGRAM(s) Oral daily  multivitamin 1 Tablet(s) Oral daily  potassium chloride    Tablet ER 20 milliEquivalent(s) Oral every 2 hours  spironolactone 25 milliGRAM(s) Oral daily  warfarin 7.5 milliGRAM(s) Oral once        PHYSICAL EXAM    Neurology: A&Ox3, nonfocal, no gross deficits  CV : +S1S2, irreguar  Lungs: CTA,  non-labored,  Abdomen: Soft, NT/ND, +BSx4Q, last BM on   : Voiding without difficulty  Extremities: No C/C/ 1+ Edema noted bilaterally,  +PP Right groin with ecchymosis,  stitch removed      LABS      134<L>  |  92<L>  |  24<H>  ----------------------------<  121<H>  3.5   |  27  |  0.83    Ca    8.8      21 May 2023 06:54    TPro  6.8  /  Alb  4.5  /  TBili  4.5<H>  /  DBili  x   /  AST  22  /  ALT  14  /  AlkPhos  72                                   12.8   7.83  )-----------( 99       ( 21 May 2023 06:54 )             36.9          PT/INR - ( 21 May 2023 06:54 )   PT: 17.6 sec;   INR: 1.52 ratio         PTT - ( 20 May 2023 06:10 )  PTT:34.5 sec       PAST MEDICAL & SURGICAL HISTORY:  Chronic atrial fibrillation      Hypothyroid      Hypertension      Severe tricuspid regurgitation      Anemia      S/P mitral valve replacement      H/O tricuspid valve repair           Physical Therapy Rec:   Home  [X ]   Home w/ PT  [  ]  Rehab  [  ]    Discussed with CT Surgery attending.    
Subjective: Patient seen and examined. No new events except as noted.   Patient seen yesterday 5/18 and again today 5/19 post valve in valve with the tricuspid valve   there was a long discussion about the patient's hypothyroidism and ultimately it was agreed upon by endocrine and anesthesia to proceed with the valve in valve for severe tricuspid insufficiency status post prior tricuspid valve replacement     the patient had the procedure today which was well-tolerated.  It was a TIFFANIE guided and the result was excellent with no residual tricuspid insufficiency.  In the recovery area the patient was awake and alert with stable vital signs in A-fib with a controlled ventricular response.  MEDICATIONS:  MEDICATIONS  (STANDING):  cefuroxime  IVPB 1500 milliGRAM(s) IV Intermittent every 8 hours  cholecalciferol 400 Unit(s) Oral at bedtime  furosemide    Tablet 40 milliGRAM(s) Oral every 12 hours  levothyroxine Injectable 112 MICROGram(s) IV Push <User Schedule>  multivitamin 1 Tablet(s) Oral daily  spironolactone 25 milliGRAM(s) Oral daily      PHYSICAL EXAM:  T(C): 36.7 (05-19-23 @ 13:35), Max: 36.9 (05-18-23 @ 18:55)  HR: 75 (05-19-23 @ 13:35) (65 - 88)  BP: 105/67 (05-19-23 @ 13:35) (100/69 - 133/66)  RR: 18 (05-19-23 @ 13:35) (12 - 18)  SpO2: 98% (05-19-23 @ 13:35) (94% - 100%)  Wt(kg): --  I&O's Summary    18 May 2023 07:01  -  19 May 2023 07:00  --------------------------------------------------------  IN: 1070 mL / OUT: 1900 mL / NET: -830 mL        Weight (kg): 70.6 (05-19 @ 06:43)    Appearance: Normal  Looks well awake and alert status post valve in valve tricuspid valve	  HEENT:   Normal oral mucosa, PERRL, EOMI	  Cardiovascular: Normal S1 S2, irregularly irregular 2/6 murmur at the apex  radiating to the axilla Respiratory: Lungs clear to auscultation, normal effort 	  Gastrointestinal:  Soft, Non-tender, + BS	  Skin: No rashes, No ecchymoses, No cyanosis, warm to touch  Musculoskeletal: Normal range of motion, normal strength  Psychiatry:  Mood & affect appropriate patient in good spirits  Ext:  brawny edema of the lower extremities with perhaps 1+ pitting edema        LABS:    CARDIAC MARKERS:                                12.9   4.57  )-----------( 88       ( 18 May 2023 05:43 )             37.2     05-18    132<L>  |  94<L>  |  20  ----------------------------<  106<H>  3.5   |  24  |  0.80    Ca    8.8      18 May 2023 05:43    TPro  6.2  /  Alb  4.3  /  TBili  3.7<H>  /  DBili  x   /  AST  20  /  ALT  12  /  AlkPhos  76  05-18    proBNP:   Lipid Profile:   HgA1c:   TSH:           TELEMETRY: 	    ECG:  	  RADIOLOGY:   DIAGNOSTIC TESTING:  [ ] Echocardiogram:  [ ]  Catheterization:  [ ] Stress Test:    OTHER: 	    < from: Intra-Operative Transesophageal Echo W or WO Ultrasound Enhancing Agent (05.19.23 @ 07:37) >  TIFFANIE Procedure:  Study was performed under general anesthesia. Images were obtained with the patient in a supine position. Image quality was good. The patient's vital signs; including heart rate, blood pressure, and oxygen saturation; remained stable throughout the procedure. The patient tolerated the procedure well and without complications.    Structural Heart Procedure:TIFFANIE imaging and3D guidance were provided during valve-in-valve tricuspid valve replacement (TVIV).    Intra-Procedure Note:  The wire was advanced from the IVC into the right atrium and positioned under fluoroscopic and echocardiographic guidance across the tricuspid valve into the RV apex. A #29mm+2cc ALL Ultra Resilia was advanced across the tricuspid bioprosthesis and positioned under fluoroscopic and echocardiographic guidance. The valve was deployed and imaging revealed a well seated and postioned valvewith normal leaflet excursion. There was mild central tricuspid regurgitation with the wire across which resolved after removal of the wire. There was no central or paravalvular tricuspid regurgitation. The mean diastolic gradients= 1mmHg. There was no change in right and left ventricular size and function.     ____________________________________________________________________     Post-Procedure Note:  Final imaging showed:    A 29mm+2cc ALL Ultra Resilia in a #31mm bio TVR which was well positioned and seated with normal function. There was no central or paravalvular tricuspid regurgitation seen. The mean diastolic gradient= 1mmHg.    The left and right ventricular size and function were unchanged.  There was no change in mitral regurgitation.  There was no pericardial effusion.    Images were reviewed with the structural heart team throughout the study.  ________________________________________________________________________________________  PRE-PROCEDURAL FINDINGS:  Left Ventricle:  Moderately dilated left ventricular cavity size. The left ventricular systolic function is mildly decreased with a calculated ejection fraction of 43 % by the Ramon's biplane method of disks46 % by 3D. LV EDV index= 79ml/m2.     Right Ventricle:  Severely enlarged right ventricular cavity size and moderately reduced systolic function.     Right Atrium:  There is a prominent Eustachian valve present, which is a normal variant.     Aortic Valve:  The aortic valve is tricuspid with normal structure without stenosis. There is no evidence of aortic regurgitation.     Mitral Valve:  An annuloplasty ring is noted in the mitral position. (complete ring). There is moderate mitral regurgitation.  The total average 3D VCA= 26mm2 with a regurgitant volume= 39ml/beat.  MR VTI= 150cm, MR Vmax= 3.9m/s.     Tricuspid Valve:  Bioprosthetic in the tricuspid position. There is degeneration of the tricuspid valve prosthesis. There is severe valvular regurgitation of the tricuspid valve prosthesis. Thereis no paravalvular regurgitation. PISA radius= 1cm. (alias velocity= 27.2cm/s.     Pulmonic Valve:  There is moderate pulmonic regurgitation.     Pericardium:  No pericardial effusion seen.  ____________________________________________________________________  QUANTITATIVE DATA (pre)  Left Ventricle Measurements     LV Vol d, MOD A2C: 131.5 ml 76.45 ml/m²  LV Vol d, MOD A4C: 95.7 ml  55.67 ml/m²  LV Vol d, MOD BP:  115.1 ml 66.94 ml/m²  LV Vol s, MOD A2C: 87.8 ml  51.07 ml/m²  LV Vol s, MOD A4C: 47.9 ml  27.84 ml/m²  LV Vol s, MOD BP:  65.3 ml  37.94 ml/m²  LVOT SV MOD BP:    49.9 ml    Aorta Measurements     Ao Sinus:    2.5 cm  Ao Asc prox: 2.80 cm       LVOT / RVOT/ Qp/Qs Data:  Mitral Valve Measurements     MV Vmax:          1.5 m/s  MV Peak Gradient: 9.0 mmHg  MV Mean Gradient: 3.5 mmHg       Tricuspid Valve Measurements     TV Mean Gradient: 3.5 mmHg       --------------------------------------------------------------------------------  TomTec  LV Analysis  EF:  46 %  EDV: 134.00 ml  ESV: 72.50 ml  SV:  61.00 ml       ________________________________________________________________________________________  Electronically signed by Dayana Gale on 5/19/2023 at 4:15:34 PM         *** Final ***    < end of copied text >  < from: Intra-Operative Transesophageal Echo W or WO Ultrasound Enhancing Agent (05.19.23 @ 07:37) >  TIFFANIE Procedure:  Study was performed under general anesthesia. Images were obtained with the patient in a supine position. Image quality was good. The patient's vital signs; including heart rate, blood pressure, and oxygen saturation; remained stable throughout the procedure. The patient tolerated the procedure well and without complications.    Structural Heart Procedure:TIFFANIE imaging and3D guidance were provided during valve-in-valve tricuspid valve replacement (TVIV).    Intra-Procedure Note:  The wire was advanced from the IVC into the right atrium and positioned under fluoroscopic and echocardiographic guidance across the tricuspid valve into the RV apex. A #29mm+2cc ALL Ultra Resilia was advanced across the tricuspid bioprosthesis and positioned under fluoroscopic and echocardiographic guidance. The valve was deployed and imaging revealed a well seated and postioned valvewith normal leaflet excursion. There was mild central tricuspid regurgitation with the wire across which resolved after removal of the wire. There was no central or paravalvular tricuspid regurgitation. The mean diastolic gradients= 1mmHg. There was no change in right and left ventricular size and function.     ____________________________________________________________________     Post-Procedure Note:  Final imaging showed:    A 29mm+2cc ALL Ultra Resilia in a #31mm bio TVR which was well positioned and seated with normal function. There was no central or paravalvular tricuspid regurgitation seen. The mean diastolic gradient= 1mmHg.    The left and right ventricular size and function were unchanged.  There was no change in mitral regurgitation.  There was no pericardial effusion.    Images were reviewed with the structural heart team throughout the study.  ________________________________________________________________________________________  PRE-PROCEDURAL FINDINGS:  Left Ventricle:  Moderately dilated left ventricular cavity size. The left ventricular systolic function is mildly decreased with a calculated ejection fraction of 43 % by the Ramon's biplane method of disks46 % by 3D. LV EDV index= 79ml/m2.     Right Ventricle:  Severely enlarged right ventricular cavity size and moderately reduced systolic function.     Right Atrium:  There is a prominent Eustachian valve present, which is a normal variant.     Aortic Valve:  The aortic valve is tricuspid with normal structure without stenosis. There is no evidence of aortic regurgitation.     Mitral Valve:  An annuloplasty ring is noted in the mitral position. (complete ring). There is moderate mitral regurgitation.  The total average 3D VCA= 26mm2 with a regurgitant volume= 39ml/beat.  MR VTI= 150cm, MR Vmax= 3.9m/s.     Tricuspid Valve:  Bioprosthetic in the tricuspid position. There is degeneration of the tricuspid valve prosthesis. There is severe valvular regurgitation of the tricuspid valve prosthesis. Thereis no paravalvular regurgitation. PISA radius= 1cm. (alias velocity= 27.2cm/s.     Pulmonic Valve:  There is moderate pulmonic regurgitation.     Pericardium:  No pericardial effusion seen.  ____________________________________________________________________  QUANTITATIVE DATA (pre)  Left Ventricle Measurements     LV Vol d, MOD A2C: 131.5 ml 76.45 ml/m²  LV Vol d, MOD A4C: 95.7 ml  55.67 ml/m²  LV Vol d, MOD BP:  115.1 ml 66.94 ml/m²  LV Vol s, MOD A2C: 87.8 ml  51.07 ml/m²  LV Vol s, MOD A4C: 47.9 ml  27.84 ml/m²  LV Vol s, MOD BP:  65.3 ml  37.94 ml/m²  LVOT SV MOD BP:    49.9 ml    Aorta Measurements     Ao Sinus:    2.5 cm  Ao Asc prox: 2.80 cm       LVOT / RVOT/ Qp/Qs Data:  Mitral Valve Measurements     MV Vmax:          1.5 m/s  MV Peak Gradient: 9.0 mmHg  MV Mean Gradient: 3.5 mmHg       Tricuspid Valve Measurements     TV Mean Gradient: 3.5 mmHg       --------------------------------------------------------------------------------  [employer]  LV Analysis  EF:  46 %  EDV: 134.00 ml  ESV: 72.50 ml  SV:  61.00 ml       ________________________________________________________________________________________  Electronically signed by Dayana Gale on 5/19/2023 at 4:15:34 PM               
subjective 'hello I am ok"    VITAL SIGNS    Telemetry:      Vital Signs Last 24 Hrs  T(C): 36.7 (23 @ 13:35), Max: 36.9 (23 @ 18:55)  T(F): 98.1 (23 @ 13:35), Max: 98.4 (23 @ 18:55)  HR: 75 (23 @ 13:35) (65 - 88)  BP: 105/67 (23 @ 13:35) (100/69 - 133/66)  RR: 18 (23 @ 13:35) (12 - 18)  SpO2: 98% (23 @ 13:35) (94% - 100%)                    @ 07:01  -   @ 07:00  --------------------------------------------------------  IN: 1070 mL / OUT: 1900 mL / NET: -830 mL    Daily Weight in k.6 (19 May 2023 04:24)                        12.9   4.57  )-----------( 88       ( 18 May 2023 05:43 )             37.2           132<L>  |  94<L>  |  20  ----------------------------<  106<H>  3.5   |  24  |  0.80    Ca    8.8      18 May 2023 05:43    TPro  6.2  /  Alb  4.3  /  TBili  3.7<H>  /  DBili  x   /  AST  20  /  ALT  12  /  AlkPhos  76  18    MEDICATIONS  (STANDING):  cefuroxime  IVPB 1500 milliGRAM(s) IV Intermittent every 8 hours  cholecalciferol 400 Unit(s) Oral at bedtime  furosemide    Tablet 40 milliGRAM(s) Oral every 12 hours  levothyroxine Injectable 112 MICROGram(s) IV Push <User Schedule>  multivitamin 1 Tablet(s) Oral daily  spironolactone 25 milliGRAM(s) Oral daily    MEDICATIONS  (PRN):                            PHYSICAL EXAM        Neurology: alert and oriented x 3, nonfocal, no gross deficits    CV :S1IRR  Lungs: B/l CTA on roomair     Abdomen: soft, nontender, nondistended, positive bowel sounds, + Bm    :Voids               Extremities:    Right groin ecchymotic  left groin benign  warm well perfused equal strength throughout    B/ll e+ DP                                      Physical Therapy Rec:   Home  [x  ]   Home w/ PT  [  ]  Rehab  [  ]    Discussed with Cardiothoracic Team at AM rounds.
    Chief Complaint: tricuspid valve dysfunction    History: Feeling well. No palpitations or tremors.    MEDICATIONS  (STANDING):  cholecalciferol 400 Unit(s) Oral at bedtime  coronavirus bivalent (EUA) Booster Vaccine (PFIZER) 0.3 milliLiter(s) IntraMuscular once  furosemide   Injectable 40 milliGRAM(s) IV Push two times a day  levothyroxine Injectable 112 MICROGram(s) IV Push <User Schedule>  metoprolol succinate  milliGRAM(s) Oral daily  multivitamin 1 Tablet(s) Oral daily  sodium chloride 0.9% lock flush 3 milliLiter(s) IV Push every 8 hours  spironolactone 25 milliGRAM(s) Oral daily    MEDICATIONS  (PRN):      PHYSICAL EXAM:  VITALS: T(C): 36.3 (05-18-23 @ 11:38)  T(F): 97.3 (05-18-23 @ 11:38), Max: 98.4 (05-17-23 @ 18:52)  HR: 76 (05-18-23 @ 11:38) (76 - 88)  BP: 101/76 (05-18-23 @ 11:38) (99/63 - 127/87)  RR:  (18 - 18)  SpO2:  (93% - 97%)  Wt(kg): --  General: Well-developed female, No acute distress, Speaking full sentences.   Eye:  No scleral icterus.   Respiratory:  Respirations are non-labored, Symmetric chest wall expansion.  Cardiovascular:  Normal rate, Regular rhythm, No edema.  Gastrointestinal:  Soft, Non-tender, Non-distended.   Integumentary:  Warm, dry.        05-18    132<L>  |  94<L>  |  20  ----------------------------<  106<H>  3.5   |  24  |  0.80    eGFR: 88    Ca    8.8      05-18    TPro  6.2  /  Alb  4.3  /  TBili  3.7<H>  /  DBili  x   /  AST  20  /  ALT  12  /  AlkPhos  76  05-18          Thyroid Function Tests:  05-16 @ 17:08 TSH 31.00 FreeT4 1.5 T3 -- Anti TPO -- Anti Thyroglobulin Ab -- TSI --                    
HPI/Interval Hx    Patient is a 53F allergic to Keflex and Strawberries, PMH Afib on Coumadin (last dose 5/15/23 7.5mg) developmental delay, HTN, Anemia, Graves DX, Hypothyroid, Anemia , MV repair  TV replacement  @ [place] who now presents for increased weight gain over last 6 weeks and shortness of breath in the setting of severe tricuspid regurgitation and right sided heart failure (16 May 2023 16:14)    She is now POD#3 s/p tricuspid Ellie (29mm Bhavana 3 Ultra Resilia valve) with Dr. Farmer and Dr. Hess. She is feeling well postoperatively and is awaiting discharge today.    MEDICATIONS  (STANDING):  aspirin enteric coated 81 milliGRAM(s) Oral daily  cholecalciferol 400 Unit(s) Oral at bedtime  furosemide    Tablet 40 milliGRAM(s) Oral every 12 hours  levothyroxine Injectable 90 MICROGram(s) IV Push <User Schedule>  metoprolol succinate  milliGRAM(s) Oral daily  multivitamin 1 Tablet(s) Oral daily  potassium chloride    Tablet ER 20 milliEquivalent(s) Oral once  spironolactone 25 milliGRAM(s) Oral daily    MEDICATIONS  (PRN):      PAST MEDICAL & SURGICAL HISTORY:  Chronic atrial fibrillation      Hypothyroid      Hypertension      Severe tricuspid regurgitation      Anemia      S/P mitral valve replacement      H/O tricuspid valve repair          Review of Systems  CONSTITUTIONAL: No weakness, fevers or chills  EYES/ENT: No visual changes;  No vertigo or throat pain   NECK: No pain or stiffness  RESPIRATORY: No cough, wheezing, hemoptysis; No shortness of breath  CARDIOVASCULAR: No chest pain or palpitations, PND, or orthopnea, (+) ankle edema bilaterally  GASTROINTESTINAL: No abdominal or epigastric pain. No nausea, vomiting, or hematemesis; No diarrhea or constipation. No melena or hematochezia.  GENITOURINARY: No dysuria, frequency or hematuria  NEUROLOGICAL: No numbness or weakness  SKIN: No itching, burning, rashes, or lesions   All other review of systems is negative unless indicated above.    Physical Exam  General: A/ox 3, No acute Distress  Neck: Supple, NO JVD  Cardiac: S1 S2, No M/R/G  Pulmonary: Breathing unlabored, No Rhonchi/Rales/Wheezing, faint bibasilar crackles   Abdomen: Soft, Non -tender, +BS x 4 quads  Extremities: No Rashes, Trace edema bilaterally  Neuro: A/o x 3, No focal deficits    Vital Signs Last 24 Hrs  T(C): 36.8 (22 May 2023 04:30), Max: 36.8 (22 May 2023 04:30)  T(F): 98.2 (22 May 2023 04:30), Max: 98.2 (22 May 2023 04:30)  HR: 81 (22 May 2023 08:43) (73 - 84)  BP: 88/56 (22 May 2023 08:47) (87/53 - 105/70)  BP(mean): --  RR: 18 (22 May 2023 04:30) (18 - 18)  SpO2: 99% (22 May 2023 04:30) (95% - 99%)    Parameters below as of 22 May 2023 04:30  Patient On (Oxygen Delivery Method): room air                            11.7   6.13  )-----------( 92       ( 22 May 2023 07:33 )             34.3     05-    132<L>  |  91<L>  |  25<H>  ----------------------------<  106<H>  3.8   |  29  |  0.78    Ca    8.7      22 May 2023 06:32        Telemetry: AFib 50-70    Other  < from: Structural Heart (23 @ 07:53) >  Study Date:     2023   Name:           BANDAR VAZ   :            1970   (53 years)   Gender:         female   MR#:            [id number]   [facility]#:           [id number]   Patient Class:  Inpatient     Valve-in-Valve Transcatheter Tricuspid Valve Replacement Report   Diagnostic Cardiologist:       Jabari Hess MD   Interventional Cardiologist:   Jabari Hess MD   Referring Physician:           Alden Gale MD   Surgeon:                       OLIVIA Lantigua Surgeon   Anesthesiologist:              Sridhar Russell MD   Cardiologist:                  Dayana Gale MD   Xray Technician:               LM MontenegroT     Procedures Performed   Procedures:              1.    Venous Access - Right Femoral     2.    Ultrasound Guided Access   3.    Transesophageal Echo   4.    Cardiac Fluoro   5.    Intubation - Non Cath Physician   6.    Percutaneous Tricuspid Valve Repair     Indications:               Congestive heart failure with  cardiomyopathy  Tricuspid insufficiency     Conclusions     Successful Tricuspid Valve in Valve (TVIV) with a 29mm Bhavana 3  Ultra Resilia valve (+3cc added to the prep) in a    degenerated bioprosthetic TVR (31mm Epic) with severe tricuspid  regurgiation and Class IV decompensated heart failure. The  patient was admitted with acute on chronic right-sided congestive  heart failure that was medically refractory to escalating doses  of diuretics. There was no TR post TVIV; please refer to the  intraoperative TIFFANIE report for additional details.    Follow-Up   Start a low dose aspirin 6 hours post TVIV; resume warfarin tonight if  the access site is stable; optimization of the thyroid    regimen as per our Endocrine team; continued optimization of the heart  failure regimen (with known moderate MR); TTE prior to  discharge; continued close outpatient cardiac follow up with Dr Gale,  as prior.    History and Risk Factors     Cardiac History   Prior PCI:                                              No   Prior CABG:                       No   Other Cardiac Surgery:                                  Yes   Number of Cardiac Surgeries:                            2   Cardiomyopathy:                                         Yes   Cardiomyopathy Type:                 non-ischemic   CAD:                                                    No   Heart Failure in the Past Year:                         Yes     Patient: BANDAR VAZ          MRN: [id number]  Study Date: 2023   07:53 AM      Page1 of 4    < end of copied text >    < from: TTE W or WO Ultrasound Enhancing Agent (23 @ 07:27) >  TRANSTHORACIC ECHOCARDIOGRAM REPORT  ________________________________________________________________________________                                      _______       Pt. Name:       BANDAR VAZ Study Date:    2023  MRN:            [id number]         YOB: 1970  Accession #:    [id number]        Age:           53 years  Account#:       [id number]     Gender:        F  Heart Rate:                      Height:        62.20 in (158.00 cm)  Rhythm:                          Weight:        155.00 lb (70.31 kg)  Blood Pressure: 101/66 mmHg      BSA/BMI:       1.72 m² / 28.16 kg/m²  ________________________________________________________________________________________  Referring Physician:    [id number] Blaise Farmer  Interpreting Physician: Vineet Ferreira M.D.  Primary Sonographer:    Luis Eduardo Arana RDCS    CPT:               ECHO TTE WO CON COMP W DOPP - 17199.m; 3D RECONST W/O                     WKSTATION - 47110.m  Indication(s):     Dyspnea, unspecified - R06.00  Procedure:         Transthoracic echocardiogram with 2-D, M-mode and complete                     spectral and color flow Doppler. Real time and full volume                     3-dimensional imaging performed.  Ordering Location: [facility]  Admission Status:Inpatient  Study Information: Image quality for this study is good.    _______________________________________________________________________________________  CONCLUSIONS:  1. Mildly dilated left ventricular cavity size. The interventricular septum is flattened in systole and diastole consistent with right ventricular pressure and volume overload. The left ventricular systolic function is mildly-moderately decreased with an ejection fraction visually estimated at 40 to 45%. Indeterminate diastolic function in the setting of a mitral annuloplasty ring.  Severely enlarged right ventricular cavity size and moderately reduced systolic function. Tricuspid annular plane systolic excursion (TAPSE) is 1.3 cm (normal >=1.7 cm). Tricuspid annular tissue Doppler S' is 5.2 cm/s (normal >10 cm/s).   1. Indeterminate diastolic function in the setting of a mitral annuloplasty ring.   2. Severely enlarged right ventricular cavity size.   3. The left atrium is severely dilated.   4. The right atrium is severely dilated.   5. Mild to moderate mitral regurgitation.   6. No evidence of aortic regurgitation.    ________________________________________________________________________________________  FINDINGS:  Left Ventricle:  Mildly dilated left ventricular cavity size. The interventricular septum is flattened in systole and diastole consistent with right ventricular pressure and volume overload. The left ventricular systolic function is mildly-moderately decreased with an ejection fraction visuallyestimated at 40 to 45%. Indeterminate diastolic function in the setting of a mitral annuloplasty ring.     Right Ventricle:  Severely enlarged right ventricular cavity size and moderately reduced systolic function. Tricuspid annular plane systolic excursion (TAPSE) is 1.3 cm (normal >=1.7 cm). Tricuspid annular tissue Doppler S' is 5.2 cm/s (normal >10 cm/s).     Left Atrium:  The left atrium is severely dilated, with an indexed volume of 103 ml/m².     Right Atrium:  The right atrium is severelydilated with an indexed volume of 112.83 ml/m².     Aortic Valve:  The aortic valve anatomy cannot be determined. There is no evidence of aortic regurgitation.     Mitral Valve:  An annuloplasty ring is noted in the mitral position. There is mild to moderate mitral regurgitation.     Tricuspid Valve:  Transcatheter heart valve inside valve-in-valve in the tricuspid position. The tricuspid prosthesis is well seated with normal function. There is no valvular regurgitation of the tricuspid valve prosthesis.     Pulmonic Valve:  Structurally normal pulmonic valve with normal leaflet excursion. There is mild pulmonic regurgitation.     Systemic Veins:  The inferior vena cava is dilated measuring 2.18 cm in diameter, (dilated >2.1cm) with abnormal inspiratory collapse (abnormal <50%) consistent with elevated right atrial pressure (~15, range 10-20mmHg).  ____________________________________________________________________    < end of copied text >  
  Subjective:  "Hello"  Sitting up in bed watching TV    Tele:     Afib 60s                           T(C): 36.7 (05-17-23 @ 04:32), Max: 36.7 (05-16-23 @ 19:51)  HR: 74 (05-17-23 @ 04:32) (74 - 85)  BP: 106/77 (05-17-23 @ 04:32) (106/77 - 126/84)  RR: 18 (05-17-23 @ 04:32) (18 - 18)  SpO2: 95% (05-17-23 @ 04:32) (92% - 95%)      LVEF:       05-17    132<L>  |  94<L>  |  19  ----------------------------<  92  3.6   |  25  |  0.66    Ca    8.7      17 May 2023 06:51    TPro  6.2  /  Alb  4.3  /  TBili  3.9<H>  /  DBili  x   /  AST  20  /  ALT  11  /  AlkPhos  68  05-17                               13.1   4.41  )-----------( 86       ( 17 May 2023 06:50 )             37.2        PT/INR - ( 17 May 2023 06:50 )   PT: 29.0 sec;   INR: 2.50 ratio         PTT - ( 17 May 2023 06:50 )  PTT:40.6 sec    CAPILLARY BLOOD GLUCOSE               CXR:      Assessment    General: NAD    Neuro: A&Ox4, gait steady, speech clear,     Respiratory: B/L BS CTA, no wheeze, no rhonchi    Cardiovascular: RRR, normal S1S2,     GI: Abd soft, NT/ND, +BSx4Q     Peripheral Vascular: +2  B/L LE edema, 1+ peripheral pulses, +varicosities    Psychiatric: Normal mood, normal affect observed    Skin: Normal exam to inspection and palpation. no bleeding, no hematoma.  No rashes wounds or lesions noted on sternum or bilateral groins        MEDICATIONS  (STANDING):  cholecalciferol 400 Unit(s) Oral at bedtime  coronavirus bivalent (EUA) Booster Vaccine (PFIZER) 0.3 milliLiter(s) IntraMuscular once  furosemide    Tablet 40 milliGRAM(s) Oral every 12 hours  metoprolol succinate  milliGRAM(s) Oral daily  multivitamin 1 Tablet(s) Oral daily  sodium chloride 0.9% lock flush 3 milliLiter(s) IV Push every 8 hours  spironolactone 25 milliGRAM(s) Oral daily  Tirosint (Levothyroxine) 150mcg Capsule 1 Capsule(s) 1 Capsule(s) Oral before breakfast       PAST MEDICAL & SURGICAL HISTORY:  Chronic atrial fibrillation      Hypothyroid      Hypertension      Severe tricuspid regurgitation      Anemia      S/P mitral valve replacement      H/O tricuspid valve repair            
S:  doing ok.  denies     Telemetry:  afib 77    Vital Signs Last 24 Hrs  T(C): 36.9 (23 @ 12:17), Max: 36.9 (23 @ 12:17)  T(F): 98.5 (23 @ 12:17), Max: 98.5 (23 @ 12:17)  HR: 87 (23 @ 12:17) (70 - 87)  BP: 113/64 (23 @ 12:17) (100/69 - 117/67)  RR: 18 (23 @ 12:17) (14 - 18)  SpO2: 95% (23 @ 12:17) (92% - 98%)                    @ 07:01  -   @ 07:00  --------------------------------------------------------  IN: 620 mL / OUT: 250 mL / NET: 370 mL     @ 07:01  -   @ 12:26  --------------------------------------------------------  IN: 120 mL / OUT: 200 mL / NET: -80 mL          Daily     Daily Weight in k.1 (20 May 2023 04:18)                                  12.5   7.78  )-----------( 95       ( 20 May 2023 06:10 )             37.0       05-20    134<L>  |  94<L>  |  22  ----------------------------<  143<H>  4.0   |  27  |  0.80    Ca    9.0      20 May 2023 06:09    TPro  6.8  /  Alb  4.5  /  TBili  4.5<H>  /  DBili  x   /  AST  22  /  ALT  14  /  AlkPhos  72  05-20      PT/INR - ( 20 May 2023 06:10 )   PT: 16.9 sec;   INR: 1.45 ratio         PTT - ( 20 May 2023 06:10 )  PTT:34.5 sec    PHYSICAL EXAM:    Neurology: alert and oriented x 3, nonfocal, no gross deficits    CV :  S1S2 heard irreg irreg    Lungs:  clear to ausc    Abdomen: soft, nontender, nondistended, positive bowel sounds           Extremities:     right groin 2 sutures in place.  + ecchymosis.  non tender.  no palpable hematoma          aspirin enteric coated 81 milliGRAM(s) Oral daily  cholecalciferol 400 Unit(s) Oral at bedtime  furosemide    Tablet 40 milliGRAM(s) Oral every 12 hours  levothyroxine Injectable 112 MICROGram(s) IV Push <User Schedule>  metoprolol succinate  milliGRAM(s) Oral daily  multivitamin 1 Tablet(s) Oral daily  spironolactone 25 milliGRAM(s) Oral daily  warfarin 7.5 milliGRAM(s) Oral once                              Physical Therapy Rec:   Home  [ x ]   Home w/ PT  [  ]  Rehab  [  ]    Discussed with Cardiothoracic Team at AM rounds.
    VITAL SIGNS    Subjective: "Im feeling ok" Denies CP, palpitation, SOB, WHITE, HA, dizziness, N/V/D, fever or chills.  No acute event noted overnight.     Telemetry:  Afib 60-90     Vital Signs Last 24 Hrs  T(C): 36.5 (23 @ 04:43), Max: 36.9 (23 @ 11:58)  T(F): 97.7 (23 @ 04:43), Max: 98.4 (23 @ 11:58)  HR: 77 (23 @ 04:43) (77 - 88)  BP: 127/87 (23 @ 04:43) (99/63 - 127/87)  RR: 18 (23 @ 04:43) (18 - 18)  SpO2: 96% (23 @ 04:43) (93% - 97%)            @ 07:01  -   @ 07:00  --------------------------------------------------------  IN: 1230 mL / OUT: 1801 mL / NET: -571 mL     @ 07:01  -   @ 10:59  --------------------------------------------------------  IN: 360 mL / OUT: 600 mL / NET: -240 mL    LABS      132<L>  |  94<L>  |  20  ----------------------------<  106<H>  3.5   |  24  |  0.80    Ca    8.8      18 May 2023 05:43    TPro  6.2  /  Alb  4.3  /  TBili  3.7<H>  /  DBili  x   /  AST  20  /  ALT  12  /  AlkPhos  76                                   12.9   4.57  )-----------( 88       ( 18 May 2023 05:43 )             37.2          PT/INR - ( 18 May 2023 05:43 )   PT: 24.5 sec;   INR: 2.10 ratio         PTT - ( 18 May 2023 05:43 )  PTT:39.4 sec        Daily     Daily Weight in k.5 (18 May 2023 07:57)      PHYSICAL EXAM    Neurology: alert and oriented x 2, nonfocal, no gross deficits    CV: (+) Irregular (+) systolic murmur    Lungs: CTA B/L     Abdomen: soft, nontender, nondistended, positive bowel sounds, (+) Flatus; (+) BM     :  Voiding               Extremities:  B/L LE (+) trace edema; negative calf tenderness; (+) 2 DP palpable        cholecalciferol 400 Unit(s) Oral at bedtime  coronavirus bivalent (EUA) Booster Vaccine (PFIZER) 0.3 milliLiter(s) IntraMuscular once  furosemide   Injectable 40 milliGRAM(s) IV Push two times a day  levothyroxine Injectable 112 MICROGram(s) IV Push <User Schedule>  metoprolol succinate  milliGRAM(s) Oral daily  multivitamin 1 Tablet(s) Oral daily  sodium chloride 0.9% lock flush 3 milliLiter(s) IV Push every 8 hours  spironolactone 25 milliGRAM(s) Oral daily    Physical Therapy Rec:   Home  [ X ]   Home w/ PT  [  ]  Rehab  [  ]    Discussed with Cardiothoracic Team at AM rounds.
HPI/Interval Hx    Patient is a 53F allergic to Keflex and Strawberries, PMH Afib on Coumadin (last dose 5/15/23 7.5mg) developmental delay, HTN, Anemia, Graves DX, Hypothyroid, Anemia , MV repair  TV replacement 2015 @ [place] who now presents for increased weight gain over last 6 weeks and shortness of breath in the setting of severe tricuspid regurgitation and right sided heart failure prior to Tricuspid Valve in Valve procedure tentatively planned for Friday of this week.  Currently patient denies chest pain.  +WHITE.  No shortness of breath while laying in bed and is able to speak in full sentences. (16 May 2023 16:14)    Pt. admitted for CHF optimization prior to tricuspid Ellei scheduled for Fr[place] 5/18. She has a history of Graves Disease and Hypothyroidism. Her TSH was found to be 31 yesterday on pre-op evaluation. She will require general anesthesia and intubation for procedure, anesthesia expressed concern with proceeding given her significantly elevated TSH. Endocrine was consulted, synthroid was changed to IV. She has persistent exertional dyspnea and LE edema, lasix was changed to IV. She is feeling well this morning, resting in bed without complaint.       MEDICATIONS  (STANDING):  cholecalciferol 400 Unit(s) Oral at bedtime  coronavirus bivalent (EUA) Booster Vaccine (PFIZER) 0.3 milliLiter(s) IntraMuscular once  furosemide   Injectable 40 milliGRAM(s) IV Push two times a day  levothyroxine Injectable 112 MICROGram(s) IV Push <User Schedule>  metoprolol succinate  milliGRAM(s) Oral daily  multivitamin 1 Tablet(s) Oral daily  sodium chloride 0.9% lock flush 3 milliLiter(s) IV Push every 8 hours  spironolactone 25 milliGRAM(s) Oral daily    MEDICATIONS  (PRN):      PAST MEDICAL & SURGICAL HISTORY:  Chronic atrial fibrillation      Hypothyroid      Hypertension      Severe tricuspid regurgitation      Anemia      S/P mitral valve replacement      H/O tricuspid valve repair          Review of Systems  CONSTITUTIONAL: No weakness, fevers or chills  EYES/ENT: No visual changes;  No vertigo or throat pain   NECK: No pain or stiffness  RESPIRATORY: No cough, wheezing, hemoptysis; No shortness of breath  CARDIOVASCULAR: No chest pain or palpitations, PND, orthopnea, or dyspnea  GASTROINTESTINAL: No abdominal or epigastric pain. No nausea, vomiting, or hematemesis; No diarrhea or constipation. No melena or hematochezia.  GENITOURINARY: No dysuria, frequency or hematuria  NEUROLOGICAL: No numbness or weakness  SKIN: No itching, burning, rashes, or lesions   All other review of systems is negative unless indicated above.    Physical Exam  General: A/ox 3, No acute Distress  Neck: Supple, NO JVD  Cardiac: S1 S2, No M/R/G  Pulmonary: CTAB, Breathing unlabored, No Rhonchi/Rales/Wheezing  Abdomen: Soft, Non -tender, +BS x 4 quads  Extremities: No Rashes, BLE ankle edema  Neuro: A/o x 3, No focal deficits    Vital Signs Last 24 Hrs  T(C): 36.3 (18 May 2023 11:38), Max: 36.9 (17 May 2023 18:52)  T(F): 97.3 (18 May 2023 11:38), Max: 98.4 (17 May 2023 18:52)  HR: 76 (18 May 2023 11:38) (76 - 88)  BP: 101/76 (18 May 2023 11:38) (99/63 - 127/87)  BP(mean): 100 (18 May 2023 04:43) (100 - 100)  RR: 18 (18 May 2023 11:38) (18 - 18)  SpO2: 97% (18 May 2023 11:38) (93% - 97%)    Parameters below as of 18 May 2023 11:38  Patient On (Oxygen Delivery Method): room air                            12.9   4.57  )-----------( 88       ( 18 May 2023 05:43 )             37.2     05-18    132<L>  |  94<L>  |  20  ----------------------------<  106<H>  3.5   |  24  |  0.80    Ca    8.8      18 May 2023 05:43    TPro  6.2  /  Alb  4.3  /  TBili  3.7<H>  /  DBili  x   /  AST  20  /  ALT  12  /  AlkPhos  76  05-18      Telemetry: AFib 65-85    Other  RADIOLOGY & ADDITIONAL STUDIES:  < from: Transthoracic Echocardiogram (01.30.23 @ 09:17) >  Patient name: BANDAR VAZ  YOB: 1970   Age: 52 (F)   MR#: [id number]  Study Date: 1/30/2023  Location: O/PSonographer: Hilda SteinbergMARYBEL  Study quality: Technically good  Referring Physician: Jabari Hess MD  Blood Pressure: 119/84 mmHg  Height: 157 cm  Weight: 66 kg  BSA: 1.7 m2  Heart Rate: 74 mmHg  ------------------------------------------------------------------------  PROCEDURE: Transthoracic echocardiogram with 2-D, M-Mode  and complete spectral and color flow Doppler. Real-time and  reconstructed 3-dimensional imaging was performed with  Workstation. Color Doppler analysis was carried out using  both 2D and 3D mapping.  INDICATION: Nonrheumatic mitral (valve) insufficiency  (I34.0)  ------------------------------------------------------------------------  MEASUREMENTS: (See below)  ------------------------------------------------------------------------  OBSERVATIONS:  Mitral Valve: Mitral annuloplasty ring/repair. There is at  least moderate mitral regurgitation by PISA quantitation.  By PISA method, ERO= 22mm2, regurgitant volume= 28ml/beat.  MV VTI PW annulus= 10.3cm  PISA radius= 0.73cm, alias velocity= 28.1cm/s.  MR VTI= 129cm, MR Vmax= 4.3m/s. The peak/mean diastolic  mitral gradients= 13/3mmHG(HR= 73bpm)  MV annular area by 3D planimetry= 3cm2.  Aortic Root: Aortic Root: 2.7 cm.  LVOT diameter: 2 cm.  Aortic arch= 2.7cm.  Aortic Valve: Normal trileaflet aortic valve. Peak  transaortic valve gradient equals 5 mm Hg, mean transaortic  valvegradient equals 3 mm Hg, aortic valve velocity time  integral equals 22 cm, estimated aortic valve area equals  2.2 sqcm.  LV SV= 48ml. No aortic valve regurgitation seen. Peak left  ventricular outflow tract gradient equals 3 mm Hg, mean  gradient is equal to 1 mm Hg, LVOT velocity time integral  equals 15 cm.  Left Atrium: Severely dilated left atrium.  LA volume index  = 130 cc/m2.  Left Ventricle: There is moderate-severe global hypokinesis  with flattening and thinning of the interventricular  septum.  The left ventricular function is moderate-severely  reduced.  The LVEF= 29% by 4D LV analysis. EDV= 123cm, LV  EDV index= 74ml/m2. The left ventricle is moderately  dilated.  Right Heart: Severe right atrial enlargement.  RA area= 34cm2, RA volume= 136ml. There is both systolic  and diastolic septal flattening consistent with right  ventricular pressure/volume overload.  The right ventricile is severely dilated with moderately  reduced function.  FAC= 26%.  Other quantitative parameters not possible.  Bioprosthetic tricuspid valve replacement is seen.  There is torrential, Grade5, tricuspid regurgitation.  VCW= 1 by 1.1cm.  TV PW annulus= 39cm.  TV annulus 3D= 4cm2.  TV SV= 156cm.  Regurgitant volume= 108ml, EROA= 1.74cm2.  PISA radius max= 1cm (alias velocity= 31.1cm/s.)  TR VTI= 62cm, TR Vmax= 2.1m/s.  The peak/mean diastolic tricuspid gradients= 13/6mmHg (HR=  66bpm)  TV VTI CW= 46cm. Grossly normal pulmonic valve leaflets.  There is dilatation of the RVOT.  The proximal PA appears to have a graft?  There is mild-moderate pulmonic regurgitation.  RVOT d= 2cm.  RVOT VTI= 20.5cm.  Pericardium/PleuraNormal pericardium with no pericardial  effusion.  Hemodynamic: The estimated right atrial pressure is  elevated. Estimated right ventricular systolic pressure  equals 38 mm Hg, assuming right atrial pressure equals 15  mm Hg, consistent with borderline pulmonary hypertension.  ------------------------------------------------------------------------    < end of copied text >
Cardiac Surgery Pre-op Note:    CC: Patient is a 53y old  Female who presents with a chief complaint of Tricuspid Valve in Valve (18 May 2023 17:15)      Referring Physician: Dr. Alden Gale                                                                                                            Surgeon: Dr. Blaise Farmer     Procedure: (23) (Tricuspid Ellie)    Allergies    Keflex (Unknown)  strawberry (Hives)    Intolerances    HPI:  Patient is a 53F allergic to Keflex and Strawberries, PMH Afib on Coumadin (last dose 5/15/23 7.5mg) developmental delay, HTN, Anemia, Graves DX, Hypothyroid, Anemia , MV repair  TV replacement 2015 @ [place] who now presents for increased weight gain over last 6 weeks and shortness of breath in the setting of severe tricuspid regurgitation and right sided heart failure prior to Tricuspid Valve in Valve procedure tentatively planned for Friday of this week.  Currently patient denies chest pain.  +WHITE.  No shortness of breath while laying in bed and is able to speak in full sentences. (16 May 2023 16:14)      PAST MEDICAL & SURGICAL HISTORY:  Chronic atrial fibrillation      Hypothyroid      Hypertension      Severe tricuspid regurgitation      Anemia      S/P mitral valve replacement      H/O tricuspid valve repair    MEDICATIONS  (STANDING):  cefuroxime  IVPB 1500 milliGRAM(s) IV Intermittent once  chlorhexidine 0.12% Liquid 30 milliLiter(s) Swish and Spit once  chlorhexidine 4% Liquid 1 Application(s) Topical once  cholecalciferol 400 Unit(s) Oral at bedtime  coronavirus bivalent (EUA) Booster Vaccine (PFIZER) 0.3 milliLiter(s) IntraMuscular once  furosemide   Injectable 40 milliGRAM(s) IV Push two times a day  levothyroxine Injectable 112 MICROGram(s) IV Push <User Schedule>  metoprolol succinate  milliGRAM(s) Oral daily  multivitamin 1 Tablet(s) Oral daily  sodium chloride 0.9% lock flush 3 milliLiter(s) IV Push every 8 hours  spironolactone 25 milliGRAM(s) Oral daily    MEDICATIONS  (PRN):      Vital Signs Last 24 Hrs  T(C): 36.3 (23 @ 11:38), Max: 36.9 (23 @ 18:52)  T(F): 97.3 (23 @ 11:38), Max: 98.4 (05-17-23 @ 18:52)  HR: 76 (23 @ 11:38) (76 - 88)  BP: 101/76 (23 @ 11:38) (99/63 - 127/87)  RR: 18 (23 @ 11:38) (18 - 18)  SpO2: 97% (23 @ 11:38) (93% - 97%)          ABO Interpretation: O (:10)     Daily     Daily Weight in k.5 (18 May 2023 07:57)  Admit Wt: Drug Dosing Weight    Weight (kg): 75.6 (16 May 2023 14:30)    Labs:                        12.9   4.57  )-----------( 88       ( 18 May 2023 05:43 )             37.2         132<L>  |  94<L>  |  20  ----------------------------<  106<H>  3.5   |  24  |  0.80    Ca    8.8      18 May 2023 05:43    TPro  6.2  /  Alb  4.3  /  TBili  3.7<H>  /  DBili  x   /  AST  20  /  ALT  12  /  AlkPhos  76  18    PT/INR - ( 18 May 2023 05:43 )   PT: 24.5 sec;   INR: 2.10 ratio         PTT - ( 18 May 2023 05:43 )  PTT:39.4 sec  P2Y12: P2Y12 Plt Reactivity: 223 PRU (23 @ 17:00)      Blood Type: ABO Interpretation: O ( 17:10)    HGB A1C: 6.1   Prealbumin:   Pro-BNP: 3439  Thyroid Panel:  17:08/31.00  1.5/--/--    MRSA: MRSA PCR Result.: NotDetec (:23)   / MSSA:     CXR: < from: Xray Chest 1 View AP/PA (23 @ 18:10) >  Patient is status post sternotomy and valve replacement.  The heart appears enlarged, but unchanged.  The lungs are clear.  There are no pleural effusions.  There is no pneumothorax.    IMPRESSION:  Cardiomegaly.  No acute pulmonary disease    EKG: from: 12 Lead ECG (23 @ 08:50) >  Ventricular Rate 83 BPM    Atrial Rate 77 BPM    QRS Duration 160 ms    Q-T Interval 472 ms    QTC Calculation(Bazett) 554 ms    R Axis -67 degrees    T Axis 152 degrees    Diagnosis Line ATRIAL FIBRILLATION  RIGHT BUNDLE BRANCH BLOCK  LEFT ANTERIOR FASCICULAR BLOCK    Carotid Duplex:      PFT's:    Echocardiogram: < from: Transthoracic Echocardiogram (23 @ 09:17) >  1. Mitral annuloplasty ring/repair. There is at least  moderate mitral regurgitation by PISA quantitation.  By PISA method, ERO= 22mm2, regurgitant volume= 28ml/beat.  MV VTI PW annulus= 10.3cm  PISA radius= 0.73cm, alias velocity= 28.1cm/s.  MR VTI= 129cm, MR Vmax= 4.3m/s.  2. The left ventricle is moderately dilated.  3. There is moderate-severe global hypokinesis with  flattening and thinning of the interventricular septum.  The left ventricular function is moderate-severely reduced.   The LVEF= 29% by 4D LV analysis.  4. There is both systolic and diastolic septal flattening  consistent with right ventricular pressure/volume overload.  The right ventricile is severely dilated with moderately  reduced function.  FAC= 26%.  Other quantitative parameters not possible.  5. Bioprosthetic tricuspid valve replacement is seen.  There is torrential, Grade5, tricuspid regurgitation.  6. The estimated right atrial pressure is elevated.    CT Heart without Coronaries w/ IV Cont (23 @ 09:41) >  Cardiomegaly, particularly the right ventricle, right atrium, and left   atrium. Postsurgical changes of the left atrium related to prior repair   of anomalous pulmonary venous return. Postsurgical changes along the   interatrial septum and interventricular septum. Mitral and tricuspid   valve prostheses identified. Correlate with surgical history.    Hypervascular 17mm left renal mass image 94 series 20. This is concerning   for renal cell neoplasm until proven otherwise. Indeterminate 15 mm left   adrenal nodule. Slight thickening of the right adrenal gland. MRI abdomen   is recommended for further evaluation. Dr. Cha discussed these   findings with OMER Tabares on 2023 at 11:05 AM, with read back.    Visualized groundglass of the lung parenchyma may reflect pulmonary   congestion. Small ascites. Anterior omental fat stranding/apparent   infiltration of unclear significance.    Severe stenosis of the imaged proximal left superficial femoral artery.   Prominent bilateral proximal thigh and inguinal superficial venous   varicosities.    Gen: WN/WD NAD  Neuro: AAOx3, nonfocal  Pulm: CTA B/L  CV: Irregular (+) Diastolic murmur  Abd: Soft, NT, ND +BS  Ext: No edema, + peripheral pulses      Pt has AICD/PPM [ ] Yes  [X ] No             Brand Name:  Pre-op Beta Blocker ordered within 24 hrs of surgery ([facility] ONLY)?  [ ] Yes  [ ] No  If not, Why? N/A  Type & Cross  [X] Yes  [ ] No  NPO after Midnight [ X] Yes  [ ] No  Pre-op ABX ordered, to be taped on chart:  [X ] Yes  [ ] No     Hibiclens/Peridex ordered [X ] Yes  [ ] No  Intraop on Hold:  TIFFANIE [X ]   Consent obtained  [X ] Yes  [ ] No

## 2023-05-22 NOTE — PROGRESS NOTE ADULT - ASSESSMENT
Patient is a 53F allergic to Keflex and Strawberries, PMH Afib on Coumadin (last dose 5/15/23 7.5mg) developmental delay, HTN, Anemia, Graves DX, Hypothyroid, Anemia , MV repair  TV replacement 2015 @ [employer] who now presents for increased weight gain over last 6 weeks and shortness of breath in the setting of severe tricuspid regurgitation and right sided heart failure (16 May 2023 16:14)    She is now POD#3 s/p tricuspid Ellie (29mm Bhavana 3 Ultra Resilia valve) with Dr. Farmer and Dr. Hess. She is feeling well postoperatively and is awaiting discharge today.

## 2023-05-22 NOTE — PROGRESS NOTE ADULT - PROBLEM SELECTOR PLAN 1
5/1930 m Bhavana Tricuspid valve in valve, RFV-    Suture to be removed in AM-5/21  Daily EKG  Coumadin 7.5 tonight   Toprol 100 resumed  Continue diuresis /w Lasix 40mg PO BID and Aldactone 25mg QD and titrate up, still with some LE edema   Dispostion to home Sunday or Monday
S/P tricuspid Ellie  Post op TTE reviewed, valve well seated with no PVL  Hypotensive this morning, decrease Toprol to 50mg daily, decrease furosemide to 40mg once daily   Repeat /73  Post op follow up with Dr. Farmer
-NPO after midnight   -Type and Screen x 1  -Hibiclens Shower tonight at 8 pm   -Continue diuresis /w Lasix 40mg PO BID and Aldactone 25mg QD and titrate up, still with some LE edema   -No TTE at this time   -Continue HR/BP control with Toprol 100mg QD  -Hold Coumadin for now, last dose 5/15 (took 7.5mg) will start heparin gtt if INR subtherapeutic.   - Plan for Cardiac Surgery in AM with Dr. Farmer and Dr. Hess
5/1930 m Bhavana Tricuspid valve in valve, RFV-    Suture to be removed in AM-5/20  ECHO  Daily EKG  Coumadin on hold    resume Toprol in am   Continue diuresis /w Lasix 40mg PO BID and Aldactone 25mg QD and titrate up, still with some LE edema   Dispostion to home
Planned for Tricuspid Ellie tomorrow pending Endocrine and Anesthesia clearance  Continue IV lasix  Daily BMP
5/1930 m Bhavana Tricuspid valve in valve, RFV-    Suture removed in AM-5/21  Daily EKG  Coumadin 7.5 tonight   Toprol 100 resumed  Continue diuresis /w Lasix 40mg PO BID and Aldactone 25mg QD and titrate up, still with some LE edema   May Ramipril 2.5 mg daily in am  Dispostion to home  Monday
-Continue diuresis /w Lasix 40mg PO BID and Aldactone 25mg QD and titrate up, still with some LE edema   -No TTE at this time   -Continue HR/BP control with Toprol 100mg QD  -Hold Coumadin for now, last dose 5/15 (took 7.5mg) will start heparin gtt if INR subtherapeutic
-Continue diuresis /w Lasix 40mg PO BID and Aldactone 25mg QD and titrate up, still with some LE edema   -No TTE at this time   -Continue HR/BP control with Toprol 100mg QD  -Hold Coumadin for now, last dose 5/15 (took 7.5mg) will start heparin gtt if INR subtherapeutic

## 2023-05-22 NOTE — DIETITIAN INITIAL EVALUATION ADULT - ADD RECOMMEND
1) Will continue to monitor PO intake, weight, labs, skin, GI status and diet 2) Can continue vitamin D and Multivitamin per chart

## 2023-05-22 NOTE — DISCHARGE NOTE PROVIDER - NSDCFUADDINST_GEN_ALL_CORE_FT
follow up echocardiogram in 30 days at Dr. Farmer's office  call Dr. Farmer for fever > 101  antibiotic prophylaxis prior to any invasive procedures including dental work  coumadin bloodwork/ dosing as per DR. Alden Gale

## 2023-05-22 NOTE — PROGRESS NOTE ADULT - PROBLEM SELECTOR PLAN 2
-Takes Coumadin 7.5mg daily, last dose was 5/15/23 total of 7.5mg   -Daily coags  -Hold heparin prior to Tricuspid Ellie will start heparin gtt for subtherapeutic INR   -Continue Toprol 100mg QD for rate control.
-Takes Coumadin 7.5mg daily, last dose was 5/15/23 total of 7.5mg   -Daily coags  -Hold heparin prior to Tricuspid Ellie will start heparin gtt for subtherapeutic INR   -Continue Toprol 100mg QD for rate control.
- Coumadin 7.5mg daily  -Daily coags  Resume coumadin per Dr Hess.
- Coumadin 7.5mg daily  -Daily coags  Resume coumadin per Dr Hess.
-Takes Coumadin 7.5mg daily, last dose was 5/15/23 total of 7.5mg   -Daily coags  -Hold heparin prior to Tricuspid Ellie will start heparin gtt for subtherapeutic INR   -Continue Toprol 100mg QD for rate control.
Continue Warfarin, monitored by [place]  [id number]
Endocrine following, pending clearance for tricuspid Ellie given TSH of 31  Levothyroxine changed to IV
- Coumadin 7.5mg daily, last dose was 5/15/23 total of 7.5mg   -Daily coags  Resume coumadin per Dr Hess.

## 2023-05-22 NOTE — DISCHARGE NOTE PROVIDER - CARE PROVIDER_API CALL
Blaise Farmer)  CTS Surgery  [address] [place] [address]  Phone: [phone]  Fax: [phone]  Follow Up Time:     Alden Gale)  Cardiovascular Disease; Internal Medicine; Interventional Cardiology  [address] [place] [id number]  Phone: [phone]  Fax: [phone]  Follow Up Time:

## 2023-05-22 NOTE — DISCHARGE NOTE PROVIDER - NSDCPNSUBOBJ_GEN_ALL_CORE
VSS  tele: Afib 60-80  audible S1 S2  neuro: alert and oriented-3; speech clear; SINGLETARY-4 extrem  lungs clear; RR easy unlabored  +bs nt nd + bm; neg n/v/d  LE: neg calf tenderness, trace LE edema; ppp bilaterally; rt groin neg bleeding noted    Discharge pt home today 5/22 as per Dr. Farmer and struct heart team

## 2023-05-22 NOTE — PROGRESS NOTE ADULT - PROBLEM SELECTOR PROBLEM 1
Elevated TSH
Elevated TSH
Severe tricuspid regurgitation

## 2023-05-22 NOTE — DIETITIAN INITIAL EVALUATION ADULT - ORAL INTAKE PTA/DIET HISTORY
visited pt at bedside this morning. reports good appetite PTA. strawberry allergy noted per chart. A1c 6.1 indicating pre-DM. Endocinology following and reporting BG well controlled.

## 2023-05-22 NOTE — DIETITIAN INITIAL EVALUATION ADULT - PERTINENT MEDS FT
MEDICATIONS  (STANDING):  aspirin enteric coated 81 milliGRAM(s) Oral daily  cholecalciferol 400 Unit(s) Oral at bedtime  furosemide    Tablet 40 milliGRAM(s) Oral every 12 hours  levothyroxine Injectable 90 MICROGram(s) IV Push <User Schedule>  lisinopril 10 milliGRAM(s) Oral daily  metoprolol succinate  milliGRAM(s) Oral daily  multivitamin 1 Tablet(s) Oral daily  potassium chloride    Tablet ER 20 milliEquivalent(s) Oral once  spironolactone 25 milliGRAM(s) Oral daily    MEDICATIONS  (PRN):   MEDICATIONS  (STANDING):  aspirin enteric coated 81 milliGRAM(s) Oral daily  cholecalciferol 400 Unit(s) Oral at bedtime  furosemide    Tablet 40 milliGRAM(s) Oral every 12 hours  levothyroxine Injectable 90 MICROGram(s) IV Push <User Schedule>  lisinopril 10 milliGRAM(s) Oral daily  metoprolol succinate  milliGRAM(s) Oral daily  multivitamin 1 Tablet(s) Oral daily  potassium chloride    Tablet ER 20 milliEquivalent(s) Oral once  spironolactone 25 milliGRAM(s) Oral daily

## 2023-05-22 NOTE — DIETITIAN INITIAL EVALUATION ADULT - NSFNSGIIOFT_GEN_A_CORE
height above noted per [facility]  .  no reported nausea/vomiting/constipation/diarrhea. Last bowel movement per flow sheets 5/19.

## 2023-05-23 ENCOUNTER — APPOINTMENT (OUTPATIENT)
Dept: CARE COORDINATION | Facility: HOME HEALTH | Age: 53
End: 2023-05-23

## 2023-05-23 VITALS
OXYGEN SATURATION: 95 % | RESPIRATION RATE: 12 BRPM | DIASTOLIC BLOOD PRESSURE: 70 MMHG | SYSTOLIC BLOOD PRESSURE: 110 MMHG | HEART RATE: 74 BPM

## 2023-05-23 RX ORDER — NAFTIFINE HYDROCHLORIDE 20 MG/G
2 CREAM TOPICAL
Qty: 45 | Refills: 0 | Status: DISCONTINUED | COMMUNITY
Start: 2021-12-20 | End: 2023-05-23

## 2023-05-23 RX ORDER — ALENDRONATE SODIUM 70 MG/1
70 TABLET ORAL
Qty: 12 | Refills: 0 | Status: DISCONTINUED | COMMUNITY
Start: 2022-03-17 | End: 2023-05-23

## 2023-05-23 RX ORDER — ALPRAZOLAM 0.25 MG/1
0.25 TABLET ORAL AS DIRECTED
Qty: 5 | Refills: 0 | Status: DISCONTINUED | COMMUNITY
Start: 2023-03-10 | End: 2023-05-23

## 2023-05-23 NOTE — PHYSICAL EXAM
[Sclera] : the sclera and conjunctiva were normal [Neck Appearance] : the appearance of the neck was normal [] : no respiratory distress [Respiration, Rhythm And Depth] : normal respiratory rhythm and effort [Exaggerated Use Of Accessory Muscles For Inspiration] : no accessory muscle use [Auscultation Breath Sounds / Voice Sounds] : lungs were clear to auscultation bilaterally [Apical Impulse] : the apical impulse was normal [Heart Rate And Rhythm] : heart rate was normal and rhythm regular [Heart Sounds] : normal S1 and S2 [Heart Sounds Gallop] : no gallops [Murmurs] : no murmurs [Heart Sounds Pericardial Friction Rub] : no pericardial rub [FreeTextEntry1] : TAVR sites without erythema or drainage or warmth, with edges well approximated. B/L Groin eccymosis noted. B/L LE - minimal edema. [Examination Of The Chest] : the chest was normal in appearance [Chest Visual Inspection Thoracic Asymmetry] : no chest asymmetry [Diminished Respiratory Excursion] : normal chest expansion [Bowel Sounds] : normal bowel sounds [Abdomen Soft] : soft [Abdomen Tenderness] : non-tender [Abnormal Walk] : normal gait [Skin Color & Pigmentation] : normal skin color and pigmentation [Oriented To Time, Place, And Person] : oriented to person, place, and time [Impaired Insight] : insight and judgment were intact [Affect] : the affect was normal [Mood] : the mood was normal

## 2023-05-23 NOTE — HISTORY OF PRESENT ILLNESS
[FreeTextEntry1] : 53F PMH Afib on Coumadin (last dose 5/15/23 7.5mg) developmental delay, HTN,\par Anemia, Graves DX, Hypothyroid, Anemia, MV repair  TV replacement 2015 @\par [place] who now presents for increased weight gain over last 6 weeks and\par shortness of breath in the setting of severe tricuspid regurgitation and right\par sided heart failure prior to Tricuspid Valve in Valve procedure tentatively\par planned for Friday of this week.  Currently patient denies chest pain.  +WHITE.\par No shortness of breath while laying in bed and is able to speak in full sentences.\par  \par Hospital Course:\par 5/17 TSH 33 > nml T4  H. Endo consulted --> Started on synthroid 112 mcg IV.\par 5/18 VVS; OR deferred until next week 2/2 hypothyroid, TSH 33.  Endocrine\par following.  INR 2.10 --> Will start heparin gtt once INR is less than 2.\par Structural Team following. Patient cleared by Endo for OR in AM.   Will proceed with surgery in AM.  \par 5/19 - 28 m Bhavana Tricuspid valve in valve, RFV-Sutured to be removed in AM,\par Recovered in PACU - compression of right groin for hematoma stabilized\par transferred to floor ABX x2- Toprol  in am\par Coumadin on hold \par 5/20 VSS,  afib 77, on coumadin -INR 1.45.  2 groin sutures+ ecchymosis -stable\par from last night.  toprol resumed.  +370cc/24hrs on lasix 40 bid/ald 25.  \par TTE reviewed by Dr Hess- no valvular leak\par 5/21: VSS, INR 1.5 cont Coumadin 7.5 mg every night, Groin sutures removed, +ecchymosis stable, May resume 1/2 dose of ramipril if BP stable\par 5/22 + hypotension noted this am --pt asymptomatic- ace d/c; sbp now 100;\par decrease toprol 50 qd and lasix 40 mg poqd as per struct heart team; rt groin neg bleeding\par DC home today as per struct heart team and Dr. Farmer\par 5/23- Seen by [facility] NP for a f/u visit. Emotional support and education provided. All questions answered. Pt overall is recovering well w/o complaints.

## 2023-05-23 NOTE — REASON FOR VISIT
[Post Hospitalization] : a post hospitalization visit [Formal Caregiver] : formal caregiver [Family Member] : family member [FreeTextEntry1] : FOLLOW YOUR HEART - Transitional Care Management Program - [facility]

## 2023-05-23 NOTE — ASSESSMENT
[FreeTextEntry1] : Pt recovering well at home s/p cardiac surgery. Good family support was noted from pt mother and also from her counselor. Reviewed all medications and dosages with pt understanding. Pt is taking meds as prescribed. Pt awaits new Rx of Tirosint. Pt is aware of F/U appts scheduled and that need to be scheduled as listed below.\par

## 2023-05-26 ENCOUNTER — APPOINTMENT (OUTPATIENT)
Dept: CARDIOTHORACIC SURGERY | Facility: CLINIC | Age: 53
End: 2023-05-26
Payer: MEDICARE

## 2023-05-26 VITALS
HEART RATE: 97 BPM | RESPIRATION RATE: 14 BRPM | SYSTOLIC BLOOD PRESSURE: 112 MMHG | DIASTOLIC BLOOD PRESSURE: 84 MMHG | OXYGEN SATURATION: 94 %

## 2023-05-26 LAB
ANION GAP SERPL CALC-SCNC: 12 MMOL/L
BUN SERPL-MCNC: 21 MG/DL
CALCIUM SERPL-MCNC: 9.5 MG/DL
CHLORIDE SERPL-SCNC: 88 MMOL/L
CO2 SERPL-SCNC: 28 MMOL/L
CREAT SERPL-MCNC: 0.63 MG/DL
EGFR: 105 ML/MIN/1.73M2
GLUCOSE SERPL-MCNC: 103 MG/DL
INR PPP: 1.51 RATIO
POTASSIUM SERPL-SCNC: 4.2 MMOL/L
PT BLD: 17.8 SEC
SODIUM SERPL-SCNC: 127 MMOL/L

## 2023-05-26 PROCEDURE — 99214 OFFICE O/P EST MOD 30 MIN: CPT

## 2023-05-26 RX ORDER — AMOXICILLIN 500 MG/1
500 TABLET, FILM COATED ORAL
Qty: 30 | Refills: 5 | Status: COMPLETED | COMMUNITY
Start: 2017-12-28 | End: 2023-05-26

## 2023-05-26 RX ORDER — FUROSEMIDE 40 MG/1
40 TABLET ORAL
Qty: 60 | Refills: 2 | Status: COMPLETED | COMMUNITY
Start: 2023-01-30 | End: 2023-05-26

## 2023-05-30 ENCOUNTER — NON-APPOINTMENT (OUTPATIENT)
Age: 53
End: 2023-05-30

## 2023-05-30 ENCOUNTER — APPOINTMENT (OUTPATIENT)
Dept: CARDIOLOGY | Facility: CLINIC | Age: 53
End: 2023-05-30
Payer: MEDICARE

## 2023-05-30 VITALS
DIASTOLIC BLOOD PRESSURE: 91 MMHG | OXYGEN SATURATION: 100 % | WEIGHT: 154 LBS | BODY MASS INDEX: 28.17 KG/M2 | HEART RATE: 98 BPM | SYSTOLIC BLOOD PRESSURE: 119 MMHG

## 2023-05-30 PROCEDURE — 99214 OFFICE O/P EST MOD 30 MIN: CPT

## 2023-05-30 PROCEDURE — 93000 ELECTROCARDIOGRAM COMPLETE: CPT

## 2023-05-30 NOTE — ASSESSMENT
[FreeTextEntry1] :  Dominique Mejia  has congenital heart disease cardiomyopathy status post tricuspid valve replacement mitral annuloplasty now with severe tricuspid insufficiency being evaluated for valve in valve.  She recently has developed weight gain facial puffiness and some edema of her lower extremities with some shortness of breath.  She also has a renal lesion which needs further evaluation with an MRI.   she is status post percutaneous valve in valve replacement with tricuspid valve with no significant residual tricuspid insufficiency on repeat echo postprocedure.  She has lost weight and feels well with stable vital signs.  She clearly has improved since the tricuspid valve and valve replacement\par \par  1.  Severe tricuspid insufficiency with weight gain edema facial puffiness-   status post valve in valve tricuspid valve replacement with weight loss and improvement in her status\par  2.  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  Atrial fibrillation ventricular rate controlled on Coumadin\par  4.  Edema  has mainly resolved post valve in valve tricuspid replacement\par  5.  Hypothyroidism on Synthroid replacement- now on Tirosint\par \par  6.  Abnormal CT with questionable lesion in the kidney for MRI\par \par   patient is clearly improved since the tricuspid valve replacement and presently has mild to moderate LV dysfunction with no significant tricuspid insufficiency on most recent echo postoperatively

## 2023-05-30 NOTE — HISTORY OF PRESENT ILLNESS
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  She recently had developed increasing edema and shortness of breath and had evidence of worsening tricuspid insufficiency. she has a history of hypothyroidism with elevated TSH presently on Tirosint\par \par   She underwent successful valve in valve tricuspid replacement with a bioprosthetic valve through the right femoral vein.  She tolerated the procedure quite well and a postoperative findings revealed no significant tricuspid insufficiency with mild to moderate global LV dysfunction.  There was concern during the procedure because the TSH was high but she tolerated the sedation well and woke up quickly and was discharged the following day\par \par  visit May 30, 2023, since discharge, the patient has lost over 10 pounds still has some mild edema of her lower extremities but overall is feeling well and in excellent spirits.\par \par   She has been off Altace and on a low-dose of Toprol Toprol-XL 50.  Recently she has been on Lasix 40 alternating with 60.  Her last INR was on the lower side probably because the liver is functioning better and her Coumadin dose was increased to 10 mg twice a week 7.5, 5 days a week\par \par  EKG May 30, 2023 atrial fibrillation ventricular rate 85-90 right bundle branch block left anterior hemiblock no change

## 2023-05-30 NOTE — DISCUSSION/SUMMARY
[EKG obtained to assist in diagnosis and management of assessed problem(s)] : EKG obtained to assist in diagnosis and management of assessed problem(s) [FreeTextEntry1] :  1.  Coumadin 7.5,5 days a week 2.510 mg 2 days a week repeat blood tests on Thursday, June 1\par  2.  Lasix 40 alternating with 60 for 1 week then back to 40 once a day\par  3.  Restart Altace at 5 mg a day, continue Toprol-XL at 50 once a day which may need to be increased to 100\par 4.  Repeat blood work June 1 including TSH\par \par  follow-up with me in 2 months\par  overall she seems to have benefit significantly from the valve and valve replacement

## 2023-05-30 NOTE — REASON FOR VISIT
[FreeTextEntry1] : Dominique Mejia 53 years old status post recent valve in valve tricuspid approximately 1 week ago for severe tricuspid insufficiency here for follow-up of her cardiac status.

## 2023-05-30 NOTE — PHYSICAL EXAM
[Well Developed] : well developed [Well Nourished] : well nourished [Normal Conjunctiva] : normal conjunctiva [Normal S1, S2] : normal S1, S2 [Clear Lung Fields] : clear lung fields [Soft] : abdomen soft [Non Tender] : non-tender [No Edema] : no edema [Normal Appearance] : normal appearance [General Appearance - Well Nourished] : well nourished [General Appearance - In No Acute Distress] : no acute distress [Respiration, Rhythm And Depth] : normal respiratory rhythm and effort [Auscultation Breath Sounds / Voice Sounds] : lungs were clear to auscultation bilaterally [Heart Sounds] : normal S1 and S2 [Cyanosis, Localized] : no localized cyanosis [de-identified] :   She overall looks well in no acute distress somewhat anxious she has puffiness in her face [de-identified] :  veins difficult to [de-identified] :  2/6 murmur at the apex and left sternal border no S3 irregularly irregular pulse [de-identified] : Slight varicosities of the lower extremities no redness pulses 2+ dorsalis pedis there is puffiness in both lower extremities with trace pitting edema [FreeTextEntry1] :   trace pitting edema,  face is no longer swollen and neck vein distention is decreased right femoral vein has a slight outpouching probably hematoma no bruit no pulsations

## 2023-06-01 ENCOUNTER — LABORATORY RESULT (OUTPATIENT)
Age: 53
End: 2023-06-01

## 2023-06-02 LAB
ALBUMIN SERPL ELPH-MCNC: 4.7 G/DL
ALP BLD-CCNC: 86 U/L
ALT SERPL-CCNC: 11 U/L
ANION GAP SERPL CALC-SCNC: 15 MMOL/L
AST SERPL-CCNC: 21 U/L
BILIRUB SERPL-MCNC: 3.8 MG/DL
BUN SERPL-MCNC: 22 MG/DL
CALCIUM SERPL-MCNC: 10.2 MG/DL
CHLORIDE SERPL-SCNC: 93 MMOL/L
CO2 SERPL-SCNC: 27 MMOL/L
CREAT SERPL-MCNC: 0.73 MG/DL
EGFR: 98 ML/MIN/1.73M2
GLUCOSE SERPL-MCNC: 136 MG/DL
INR PPP: 2.25 RATIO
NT-PROBNP SERPL-MCNC: ABNORMAL PG/ML
POTASSIUM SERPL-SCNC: 4.1 MMOL/L
PROT SERPL-MCNC: 7 G/DL
PT BLD: 26.5 SEC
SODIUM SERPL-SCNC: 135 MMOL/L
TSH SERPL-ACNC: 22.7 UIU/ML

## 2023-06-02 NOTE — PHYSICAL EXAM
[Irregularly Irregular] : irregularly irregular [Normal S1] : normal S1 [Normal S2] : normal S2 [No Murmur] : no murmurs heard [___+] : [unfilled]U+ pretibial pitting edema on the left [2+] : left 2+ [Normal] : alert and oriented, normal memory [de-identified] : R groin incision ecchymotic, clean and dry with no hematoma

## 2023-06-02 NOTE — HISTORY OF PRESENT ILLNESS
[FreeTextEntry1] : Ms Moran returns for post op evaluation s/p tricuspid valve in valve on 5/19/2023 using a 29mm Bhavana 3 Ultra Resilia THV (in her degenerated 31mm EPIC); the surgical valve had severe intra-valvular TR and there was no residual TR post TVIV. \par \par She had an uncomplicated post op course (other than some minor oozing at the RFV access site requiring additional manual compression) and was discharged POD #3. She is accompanied today by her parents who provide additional history. She has been feeling quite well since her discharge and reports that she has lost some weight as well (she was markedly volume up prior to her TVIV). She has no complaints and feels well overall. She naps every day as she did preop. She denies any dyspnea on exertion, fatigue, chest pain/pressure or lightheadedness. 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 She looks well and is in great spirits today. \par

## 2023-06-02 NOTE — REASON FOR VISIT
[Structural Heart and Valve Disease] : structural heart and valve disease [Family Member] : family member [FreeTextEntry3] : Dr. Gale

## 2023-06-02 NOTE — DISCUSSION/SUMMARY
[FreeTextEntry1] : Dominique presents for her first postoperative follow-up after undergoing a tricuspid valve in valve procedure on May 19, 2023.  Overall she looks and feels well.  Her parents are very pleased with her progress since discharge.  She reports no symptoms of fatigue, dyspnea on exertion, or otherwise.  She did have some oozing at the right groin access site in the first 24 hours postoperatively, for which extended manual compression was applied with effective hemostasis. As expected, there is extensive ecchymosis (extending to the right buttock, flank, thigh, and suprapubic area) but it is soft and not tender to palpation. The access site is clean and dry. Her appetite is normal. She does have 1+ bilateral LE edema and I am putting her back on her pre-operative dose of Lasix 40mg BID for now.\par \par I am recommending that she follow up with Dr Gale in the next 1-2 weeks, at which time he will assess her edema and adjust her diuretic regimen accordingly (she has known moderate MR). He monitors and adjusts her warfarin as well. She will return to see our team, with a repeat TTE, approximately 1 month post-operatively. 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 We discussed the care of her groins, keeping them clean and dry, which her mother will continue to assist her with. I have answered all of her and her parent's questions in detail.

## 2023-06-04 NOTE — CHART NOTE - NSCHARTNOTEFT_GEN_A_CORE
Patient was s/p Minimally Invasive Tricuspid Valve-in-Valve Replacement on 5/19. After review of documentation and patient's previous TTE's from several years ago, they were found to have acute on chronic systolic heart failure on this admission.

## 2023-06-16 ENCOUNTER — RX RENEWAL (OUTPATIENT)
Age: 53
End: 2023-06-16

## 2023-06-20 ENCOUNTER — APPOINTMENT (OUTPATIENT)
Dept: CARDIOLOGY | Facility: CLINIC | Age: 53
End: 2023-06-20
Payer: MEDICARE

## 2023-06-20 ENCOUNTER — TRANSCRIPTION ENCOUNTER (OUTPATIENT)
Age: 53
End: 2023-06-20

## 2023-06-20 VITALS
WEIGHT: 159 LBS | DIASTOLIC BLOOD PRESSURE: 79 MMHG | HEART RATE: 84 BPM | OXYGEN SATURATION: 100 % | BODY MASS INDEX: 29.08 KG/M2 | SYSTOLIC BLOOD PRESSURE: 112 MMHG

## 2023-06-20 PROCEDURE — 99214 OFFICE O/P EST MOD 30 MIN: CPT

## 2023-06-20 RX ORDER — FUROSEMIDE 40 MG/1
40 TABLET ORAL DAILY
Qty: 90 | Refills: 3 | Status: DISCONTINUED | COMMUNITY
Start: 2023-05-26 | End: 2023-06-20

## 2023-06-20 NOTE — PHYSICAL EXAM
[Well Developed] : well developed [Well Nourished] : well nourished [No Acute Distress] : no acute distress [Normal Conjunctiva] : normal conjunctiva [Normal Venous Pressure] : normal venous pressure [No Carotid Bruit] : no carotid bruit [Normal S1, S2] : normal S1, S2 [No Murmur] : no murmur [No Rub] : no rub [No Gallop] : no gallop [Clear Lung Fields] : clear lung fields [Good Air Entry] : good air entry [No Respiratory Distress] : no respiratory distress  [Soft] : abdomen soft [Non Tender] : non-tender [No Masses/organomegaly] : no masses/organomegaly [Normal Bowel Sounds] : normal bowel sounds [Normal Gait] : normal gait [Abnormal Gait] : abnormal gait [No Edema] : no edema [No Cyanosis] : no cyanosis [No Clubbing] : no clubbing [No Varicosities] : no varicosities [Edema ___] : edema [unfilled] [No Rash] : no rash [No Skin Lesions] : no skin lesions [Moves all extremities] : moves all extremities [No Focal Deficits] : no focal deficits [Normal Speech] : normal speech [Alert and Oriented] : alert and oriented [Normal memory] : normal memory

## 2023-06-20 NOTE — REVIEW OF SYSTEMS
[Feeling Fatigued] : feeling fatigued [Dyspnea on exertion] : dyspnea during exertion [Lower Ext Edema] : lower extremity edema [Joint Stiffness] : joint stiffness [Negative] : Heme/Lymph

## 2023-06-20 NOTE — HISTORY OF PRESENT ILLNESS
[FreeTextEntry1] : 53-year-old female being seen on an urgent basis after her nurse noted low blood pressure.  She has a long cardiac history with both congenital heart disease and a congestive cardiomyopathy.  She has also had mitral valve repair, tricuspid replacement, and had a valve in valve procedure done to her tricuspid valve about a month ago.  A postprocedure echo indicates the tricuspid regurgitation is greatly improved.  She continues to have a dilated right atrium and right ventricle.  She had considerable peripheral edema before the procedure.  It improved a little, but has now stabilized.  Her Lasix was increased from 40 mg/day to 80 mg/day during the past week.\par \par She has no new symptoms or change in her status.  She notes mild dyspnea and peripheral edema which are unchanged.

## 2023-06-20 NOTE — DISCUSSION/SUMMARY
[FreeTextEntry1] : Ms Moran had low blood pressure noted this morning for unclear reasons.  Her BP here was about 115 systolic and she is asymptomatic so there does not appear to be any emergency.  She has not been diuresing very well the past few weeks on Lasix which has been increased to 80 mg/day.\par \par She still has a lot of right-sided failure which presumably is interfering with the absorption of her medication.  I thought she might do better on torsemide and so she was switched to 20 mg of torsemide per day.  She will remain on her other medications of metoprolol, Coumadin, spironolactone, and Altace.

## 2023-06-22 ENCOUNTER — OUTPATIENT (OUTPATIENT)
Dept: OUTPATIENT SERVICES | Facility: HOSPITAL | Age: 53
LOS: 1 days | End: 2023-06-22
Payer: MEDICARE

## 2023-06-22 ENCOUNTER — NON-APPOINTMENT (OUTPATIENT)
Age: 53
End: 2023-06-22

## 2023-06-22 ENCOUNTER — APPOINTMENT (OUTPATIENT)
Dept: CARDIOTHORACIC SURGERY | Facility: CLINIC | Age: 53
End: 2023-06-22
Payer: MEDICARE

## 2023-06-22 VITALS
BODY MASS INDEX: 29.22 KG/M2 | DIASTOLIC BLOOD PRESSURE: 73 MMHG | RESPIRATION RATE: 16 BRPM | OXYGEN SATURATION: 100 % | HEART RATE: 84 BPM | HEIGHT: 62 IN | WEIGHT: 158.8 LBS | SYSTOLIC BLOOD PRESSURE: 103 MMHG

## 2023-06-22 DIAGNOSIS — I38 ENDOCARDITIS, VALVE UNSPECIFIED: ICD-10-CM

## 2023-06-22 DIAGNOSIS — Z95.2 PRESENCE OF PROSTHETIC HEART VALVE: Chronic | ICD-10-CM

## 2023-06-22 DIAGNOSIS — I35.0 NONRHEUMATIC AORTIC (VALVE) STENOSIS: ICD-10-CM

## 2023-06-22 DIAGNOSIS — Z98.890 OTHER SPECIFIED POSTPROCEDURAL STATES: Chronic | ICD-10-CM

## 2023-06-22 PROCEDURE — 93306 TTE W/DOPPLER COMPLETE: CPT

## 2023-06-22 PROCEDURE — 93306 TTE W/DOPPLER COMPLETE: CPT | Mod: 26

## 2023-06-22 PROCEDURE — 76376 3D RENDER W/INTRP POSTPROCES: CPT

## 2023-06-22 PROCEDURE — 99214 OFFICE O/P EST MOD 30 MIN: CPT

## 2023-06-22 PROCEDURE — 76376 3D RENDER W/INTRP POSTPROCES: CPT | Mod: 26

## 2023-06-22 RX ORDER — LEVOTHYROXINE SODIUM 125 UG/1
125 CAPSULE ORAL DAILY
Refills: 0 | Status: DISCONTINUED | COMMUNITY
End: 2023-06-22

## 2023-06-22 NOTE — HISTORY OF PRESENT ILLNESS
[FreeTextEntry1] : Ms Moran returns for post op evaluation s/p tricuspid valve in valve on 5/19/2023 using a 29mm Bhavana 3 Ultra Resilia THV (in her degenerated 31mm EPIC); the surgical valve had severe intra-valvular TR and there was no residual TR post TVIV. \par \par She had an uncomplicated post op course and was discharged POD #3. She is accompanied today by her parents who provide additional history. She has been feeling quite well since her discharge and reports that she has lost some weight as well (she was markedly volume up prior to her TVIV). \par \par Two days ago she was found to by hypotensive by her residential nurse (SBP 80) and was referred to Dr. Stroud for evaluation. Her Lasix was increased from 40mg to 80mg the week prior by Dr. Gale. Her SBP was 110 in the office and she was asymptomatic. He found her to have worsening LE edema extending to her knees, her diuretic was changed from Furosemide 80mg to Torsemide 20mg which she started taking yesterday. Her legs are edematous today, her weight ranges between 157lbs - 160lbs. She is feeling "very good" with no dyspnea, no dizziness, or lightheadedness. Her mother feels as if her afternoon energy level has improved. \par

## 2023-06-22 NOTE — PHYSICAL EXAM
[Well Developed] : well developed [No Acute Distress] : no acute distress [Normal S1, S2] : normal S1, S2 [No Murmur] : no murmur [Clear Lung Fields] : clear lung fields [Soft] : abdomen soft [Non Tender] : non-tender [Normal Gait] : normal gait [Normal] : moves all extremities, no focal deficits, normal speech [Alert and Oriented] : alert and oriented [Normal memory] : normal memory [de-identified] : 2+ edema bilaterally extending to knees

## 2023-06-22 NOTE — CARDIOLOGY SUMMARY
[de-identified] : 5/20/23\par CONCLUSIONS:\par 1. Mildly dilated left ventricular cavity size. The interventricular septum is flattened in systole and diastole consistent with right ventricular pressure and volume overload. The left ventricular systolic function is mildly-moderately decreased with an ejection fraction visually estimated at 40 to 45%. Indeterminate diastolic function in the setting of a mitral annuloplasty ring.\par Severely enlarged right ventricular cavity size and moderately reduced systolic function. Tricuspid annular plane systolic excursion (TAPSE) is 1.3 cm (normal >=1.7 cm). Tricuspid annular tissue Doppler S' is 5.2 cm/s (normal >10 cm/s).\par  1. Indeterminate diastolic function in the setting of a mitral annuloplasty ring.\par  2. Severely enlarged right ventricular cavity size.\par  3. The left atrium is severely dilated.\par  4. The right atrium is severely dilated.\par  5. Mild to moderate mitral regurgitation.\par  6. No evidence of aortic regurgitation.\par

## 2023-06-22 NOTE — DISCUSSION/SUMMARY
[Congestive Heart Failure] : congestive heart failure [Not Responding to Treatment] : not responding to treatment [Medication Changes Per Orders] : Medication changes are as documented in orders [Patient] : the patient [Family] : the patient's family [___ Week(s)] : in [unfilled] week(s) [With ___] : with [unfilled]

## 2023-06-22 NOTE — REASON FOR VISIT
[Structural Heart and Valve Disease] : structural heart and valve disease [Other: ____] : [unfilled] [FreeTextEntry3] : Dr. Alden Gale

## 2023-06-22 NOTE — ASSESSMENT
[FreeTextEntry1] : Dominique returns today for one month follow up s/p s/p tricuspid valve in valve on 5/19/2023 using a 29mm Bhavana 3 Ultra Resilia THV (in her degenerated 31mm EPIC); the surgical valve had severe intra-valvular TR and there was no residual TR post TVIV. \par \par She is feeling well with improvement in energy and dyspnea. She has some persistent LE edema that has increased over the past several days. TTE today reviewed with Dr. Gale, discussed with Dr. Hess. Tricuspid valve is well seated, normal function, with no PVL. She has severe Mitral Regurgitation. Recommend increasing torsemide to 40mg daily, she was advised to follow up with with Dr. Gale in one week for repeat labs and clinical assessment. She will continue to monitor her weights daily. Recommendations discussed with patient as well as her mother.

## 2023-06-22 NOTE — REVIEW OF SYSTEMS
[Negative] : Heme/Lymph [Fever] : no fever [Chills] : no chills [Feeling Fatigued] : not feeling fatigued [Dyspnea on exertion] : not dyspnea during exertion [Abdominal Pain] : no abdominal pain [Change in Appetite] : no change in appetite [Dizziness] : no dizziness [FreeTextEntry3] : eyeglasses

## 2023-06-23 ENCOUNTER — NON-APPOINTMENT (OUTPATIENT)
Age: 53
End: 2023-06-23

## 2023-07-03 ENCOUNTER — APPOINTMENT (OUTPATIENT)
Dept: CARDIOLOGY | Facility: CLINIC | Age: 53
End: 2023-07-03
Payer: MEDICARE

## 2023-07-03 VITALS
HEART RATE: 87 BPM | HEIGHT: 62 IN | BODY MASS INDEX: 25.95 KG/M2 | OXYGEN SATURATION: 98 % | SYSTOLIC BLOOD PRESSURE: 105 MMHG | WEIGHT: 141 LBS | DIASTOLIC BLOOD PRESSURE: 76 MMHG

## 2023-07-03 DIAGNOSIS — I50.9 HEART FAILURE, UNSPECIFIED: ICD-10-CM

## 2023-07-03 LAB
ALBUMIN SERPL ELPH-MCNC: 5 G/DL
ALP BLD-CCNC: 76 U/L
ALT SERPL-CCNC: 12 U/L
ANION GAP SERPL CALC-SCNC: 15 MMOL/L
AST SERPL-CCNC: 21 U/L
BILIRUB SERPL-MCNC: 4.4 MG/DL
BUN SERPL-MCNC: 26 MG/DL
CALCIUM SERPL-MCNC: 10.5 MG/DL
CHLORIDE SERPL-SCNC: 79 MMOL/L
CO2 SERPL-SCNC: 35 MMOL/L
CREAT SERPL-MCNC: 0.98 MG/DL
EGFR: 69 ML/MIN/1.73M2
GLUCOSE SERPL-MCNC: 166 MG/DL
NT-PROBNP SERPL-MCNC: 6842 PG/ML
POTASSIUM SERPL-SCNC: 3.6 MMOL/L
PROT SERPL-MCNC: 6.9 G/DL
SODIUM SERPL-SCNC: 129 MMOL/L

## 2023-07-03 PROCEDURE — 99214 OFFICE O/P EST MOD 30 MIN: CPT

## 2023-07-03 NOTE — DISCUSSION/SUMMARY
[FreeTextEntry1] : Patient has improved on her present regimen with can considerable weight reduction.  She is clinically and hemodynamically stable\par \par For now I told her to hold the Zaroxolyn 2.5 and await the results of the blood tests of CBC complete metabolic profile potassium BNP and INR\par \par Pending the results we may restart Zaroxolyn perhaps 2.5 every third day.\par Routine follow with me again in a few weeks which she has an appointment

## 2023-07-03 NOTE — REASON FOR VISIT
[FreeTextEntry1] : Dominique Martinezung 53 years old well-known to me.  She recently developed increasing weight gain and edema with facial edema and weight gain to almost 154 pounds.

## 2023-07-03 NOTE — PHYSICAL EXAM
[Normal Conjunctiva] : normal conjunctiva [Normal S1, S2] : normal S1, S2 [Clear Lung Fields] : clear lung fields [Soft] : abdomen soft [Non Tender] : non-tender [de-identified] : Looks well no acute distress vivacious in good spirits no respiratory distress no facial edema [de-identified] : S1-S2 irregularly irregular 2/6 murmur at the apex [de-identified] : Trace to 1+ edema of the lower extremities.  Varicose veins are now visible

## 2023-07-03 NOTE — ASSESSMENT
[FreeTextEntry1] :  Dominique Mejia  has congenital heart disease cardiomyopathy status post tricuspid valve replacement mitral annuloplasty now with severe tricuspid insufficiency being evaluated for valve in valve.  She recently has developed weight gain facial puffiness and some edema of her lower extremities with some shortness of breath.  She also has a renal lesion which needs further evaluation with an MRI.   she is status post percutaneous valve in valve replacement with tricuspid valve with no significant residual tricuspid insufficiency on repeat echo postprocedure.  She has lost weight and feels well with stable vital signs.  She clearly has improved since the tricuspid valve and valve replacement.  She recently has gained weight though her echo reveals a well-functioning tricuspid valve in valve with moderate LV dysfunction and moderate mitral regurgitation.  On a regimen of Zaroxolyn 2.5 and torsemide 40 she has lost weight and feels well\par \par  1.  Severe tricuspid insufficiency with weight gain edema facial puffiness-   status post valve in valve tricuspid valve replacement with weight loss and improvement in her status\par  2.  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  Atrial fibrillation ventricular rate controlled on Coumadin\par  4.  Edema  has mainly resolved post valve in valve tricuspid replacement\par  5.  Hypothyroidism on Synthroid replacement- now on Tirosint\par \par  6.  Abnormal CT with questionable lesion in the kidney for MRI\par \par 7.  Recent weight gain though asymptomatic which has responded with a 10 pound weight loss on Zaroxolyn 2.5 and torsemide 40.  Blood work was drawn today to follow-up on her potassium and renal function\par \par   patient is clearly improved since the tricuspid valve replacement and presently has mild to moderate LV dysfunction with no significant tricuspid insufficiency on most recent echo postoperatively

## 2023-07-03 NOTE — HISTORY OF PRESENT ILLNESS
[FreeTextEntry1] : The patient is status post recent tricuspid valve replacement valve in valve.  Subsequent echoes have been unremarkable with a well-functioning tricuspid valve moderate to severe LV dysfunction and moderate mitral regurgitation.\par \par She recently developed increasing edema facial edema and weight gain 255 pounds.  She was seen by Dr. Stroud who initially placed her on torsemide 20 which was increased to 40.  She subsequently saw structural heart and had a repeat echo which was unremarkable with a well-functioning tricuspid valve.\par \par She continued not to lose weight and Zaroxolyn (mentalis own) 2.5 was added and she has had a 10 pound weight loss 241 pounds with almost no edema and her facial appearance has improved.\par \par Throughout this process she has been asymptomatic.  She is working several days a week without difficulty and denies chest pain shortness of breath dizziness or palpitations.\par \par

## 2023-07-04 LAB
HCT VFR BLD CALC: 39.1 %
HGB BLD-MCNC: 13.5 G/DL
MCHC RBC-ENTMCNC: 34.3 PG
MCHC RBC-ENTMCNC: 34.5 GM/DL
MCV RBC AUTO: 99.2 FL
PLATELET # BLD AUTO: 111 K/UL
RBC # BLD: 3.94 M/UL
RBC # FLD: 14.7 %
WBC # FLD AUTO: 4.71 K/UL

## 2023-07-06 RX ORDER — CALCIUM CARBONATE/VITAMIN D3 500 MG-10
500-10 TABLET ORAL
Qty: 90 | Refills: 2 | Status: ACTIVE | COMMUNITY
Start: 2017-03-10 | End: 1900-01-01

## 2023-07-17 ENCOUNTER — LABORATORY RESULT (OUTPATIENT)
Age: 53
End: 2023-07-17

## 2023-07-18 LAB
T4 SERPL-MCNC: 6.3 UG/DL
TSH SERPL-ACNC: 14.4 UIU/ML

## 2023-07-25 ENCOUNTER — NON-APPOINTMENT (OUTPATIENT)
Age: 53
End: 2023-07-25

## 2023-07-25 ENCOUNTER — APPOINTMENT (OUTPATIENT)
Dept: CARDIOLOGY | Facility: CLINIC | Age: 53
End: 2023-07-25
Payer: MEDICARE

## 2023-07-25 VITALS
HEART RATE: 96 BPM | BODY MASS INDEX: 26.68 KG/M2 | SYSTOLIC BLOOD PRESSURE: 100 MMHG | OXYGEN SATURATION: 96 % | WEIGHT: 145 LBS | DIASTOLIC BLOOD PRESSURE: 73 MMHG | HEIGHT: 62 IN

## 2023-07-25 DIAGNOSIS — R23.3 SPONTANEOUS ECCHYMOSES: ICD-10-CM

## 2023-07-25 DIAGNOSIS — R60.0 LOCALIZED EDEMA: ICD-10-CM

## 2023-07-25 DIAGNOSIS — N28.89 OTHER SPECIFIED DISORDERS OF KIDNEY AND URETER: ICD-10-CM

## 2023-07-25 PROCEDURE — 99214 OFFICE O/P EST MOD 30 MIN: CPT

## 2023-07-25 NOTE — PHYSICAL EXAM
[Normal Conjunctiva] : normal conjunctiva [Normal S1, S2] : normal S1, S2 [Clear Lung Fields] : clear lung fields [Soft] : abdomen soft [Non Tender] : non-tender [de-identified] : Marked neck vein distention at 45 degrees [de-identified] : Ecchymoses behind her left forearm and ecchymoses behind her legs near the popliteal area these seem spontaneous [de-identified] : Looks well no acute distress vivacious in good spirits no respiratory distress no facial edema [de-identified] : S1-S2 irregularly irregular 2/6 murmur at the apex [de-identified] : Trace to 1+ edema of the lower extremities.  Varicose veins are now visible

## 2023-07-25 NOTE — DISCUSSION/SUMMARY
[FreeTextEntry1] : 1.  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  She does need more supervision with skilled nursing care and I wrote such a note an order to the [facility] to arrange for this\par \par 2.  She should continue to be active as she is as despite the RV failure she remains relatively clinically asymptomatic\par \par 3.  I added metolazone 2.5 mg every other day which she will take for 1 week and then we will reassess.  She was also blood work for CBC to make sure her platelets INR are intact as well as renal function liver function etc.\par \par Follow-up with me in 1 month

## 2023-07-25 NOTE — PHYSICAL EXAM
[Normal Conjunctiva] : normal conjunctiva [Normal S1, S2] : normal S1, S2 [Clear Lung Fields] : clear lung fields [Soft] : abdomen soft [Non Tender] : non-tender [de-identified] : Marked neck vein distention at 45 degrees [de-identified] : Ecchymoses behind her left forearm and ecchymoses behind her legs near the popliteal area these seem spontaneous [de-identified] : Looks well no acute distress vivacious in good spirits no respiratory distress no facial edema [de-identified] : S1-S2 irregularly irregular 2/6 murmur at the apex [de-identified] : Trace to 1+ edema of the lower extremities.  Varicose veins are now visible

## 2023-07-25 NOTE — DISCUSSION/SUMMARY
[FreeTextEntry1] : 1.  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  She does need more supervision with skilled nursing care and I wrote such a note an order to the [facility] to arrange for this\par \par 2.  She should continue to be active as she is as despite the RV failure she remains relatively clinically asymptomatic\par \par 3.  I added metolazone 2.5 mg every other day which she will take for 1 week and then we will reassess.  She was also blood work for CBC to make sure her platelets INR are intact as well as renal function liver function etc.\par \par Follow-up with me in 1 month

## 2023-07-25 NOTE — REASON FOR VISIT
[FreeTextEntry1] : Dominique daly Rom 53 years old here for follow-up of her cardiac status.  She is status percutaneous tricuspid valve replacement.  She has evidence of right heart failure and recently has had increasing edema and weight gain though she remains asymptomatic.  She was seen in follow-up on July 25, 2023

## 2023-08-03 LAB
INR PPP: 3.98 RATIO
PT BLD: 43.2 SEC

## 2023-08-04 LAB
ALBUMIN SERPL ELPH-MCNC: 5.2 G/DL
ALP BLD-CCNC: 95 U/L
ALT SERPL-CCNC: 18 U/L
ANION GAP SERPL CALC-SCNC: 14 MMOL/L
AST SERPL-CCNC: 40 U/L
BILIRUB SERPL-MCNC: 4.7 MG/DL
BUN SERPL-MCNC: 34 MG/DL
CALCIUM SERPL-MCNC: 11.1 MG/DL
CHLORIDE SERPL-SCNC: 76 MMOL/L
CO2 SERPL-SCNC: 38 MMOL/L
CREAT SERPL-MCNC: 0.76 MG/DL
EGFR: 93 ML/MIN/1.73M2
GLUCOSE SERPL-MCNC: 129 MG/DL
NT-PROBNP SERPL-MCNC: 4752 PG/ML
POTASSIUM SERPL-SCNC: 3.8 MMOL/L
PROT SERPL-MCNC: 7.6 G/DL
SODIUM SERPL-SCNC: 128 MMOL/L

## 2023-08-05 ENCOUNTER — NON-APPOINTMENT (OUTPATIENT)
Age: 53
End: 2023-08-05

## 2023-08-06 ENCOUNTER — NON-APPOINTMENT (OUTPATIENT)
Age: 53
End: 2023-08-06

## 2023-08-07 DIAGNOSIS — R17 UNSPECIFIED JAUNDICE: ICD-10-CM

## 2023-08-11 LAB
ALBUMIN SERPL ELPH-MCNC: 5.1 G/DL
ALP BLD-CCNC: 78 U/L
ALT SERPL-CCNC: 14 U/L
ANION GAP SERPL CALC-SCNC: 17 MMOL/L
AST SERPL-CCNC: 27 U/L
BILIRUB SERPL-MCNC: 3.8 MG/DL
BUN SERPL-MCNC: 38 MG/DL
CALCIUM SERPL-MCNC: 9.8 MG/DL
CHLORIDE SERPL-SCNC: 84 MMOL/L
CO2 SERPL-SCNC: 32 MMOL/L
CREAT SERPL-MCNC: 0.86 MG/DL
EGFR: 80 ML/MIN/1.73M2
GLUCOSE SERPL-MCNC: 99 MG/DL
HAPTOGLOB SERPL-MCNC: 33 MG/DL
HCT VFR BLD CALC: 36.9 %
HGB BLD-MCNC: 12.7 G/DL
INR PPP: 2.56 RATIO
LDH SERPL-CCNC: 287 U/L
MCHC RBC-ENTMCNC: 34.3 PG
MCHC RBC-ENTMCNC: 34.4 GM/DL
MCV RBC AUTO: 99.7 FL
PLATELET # BLD AUTO: 121 K/UL
POTASSIUM SERPL-SCNC: 3 MMOL/L
PROT SERPL-MCNC: 7 G/DL
PT BLD: 28.1 SEC
RBC # BLD: 3.7 M/UL
RBC # FLD: 14.3 %
SODIUM SERPL-SCNC: 133 MMOL/L
WBC # FLD AUTO: 4.65 K/UL

## 2023-08-16 ENCOUNTER — INPATIENT (INPATIENT)
Facility: HOSPITAL | Age: 53
LOS: 1 days | Discharge: HOME | DRG: 919 | End: 2023-08-18
Attending: SURGERY | Admitting: SURGERY
Payer: MEDICARE

## 2023-08-16 VITALS
SYSTOLIC BLOOD PRESSURE: 96 MMHG | OXYGEN SATURATION: 100 % | RESPIRATION RATE: 18 BRPM | TEMPERATURE: 98 F | WEIGHT: 136.91 LBS | HEIGHT: 62 IN | DIASTOLIC BLOOD PRESSURE: 59 MMHG | HEART RATE: 110 BPM

## 2023-08-16 DIAGNOSIS — R57.8 OTHER SHOCK: ICD-10-CM

## 2023-08-16 DIAGNOSIS — Z98.890 OTHER SPECIFIED POSTPROCEDURAL STATES: Chronic | ICD-10-CM

## 2023-08-16 DIAGNOSIS — Z95.2 PRESENCE OF PROSTHETIC HEART VALVE: Chronic | ICD-10-CM

## 2023-08-16 LAB
ALBUMIN SERPL ELPH-MCNC: 4.3 G/DL — SIGNIFICANT CHANGE UP (ref 3.3–5)
ALP SERPL-CCNC: 61 U/L — SIGNIFICANT CHANGE UP (ref 40–120)
ALT FLD-CCNC: 13 U/L — SIGNIFICANT CHANGE UP (ref 10–45)
ANION GAP SERPL CALC-SCNC: 13 MMOL/L — SIGNIFICANT CHANGE UP (ref 5–17)
ANION GAP SERPL CALC-SCNC: 15 MMOL/L — SIGNIFICANT CHANGE UP (ref 5–17)
APTT BLD: 38.5 SEC — HIGH (ref 24.5–35.6)
APTT BLD: 43.1 SEC — HIGH (ref 24.5–35.6)
AST SERPL-CCNC: 44 U/L — HIGH (ref 10–40)
BASOPHILS # BLD AUTO: 0.07 K/UL — SIGNIFICANT CHANGE UP (ref 0–0.2)
BASOPHILS NFR BLD AUTO: 0.9 % — SIGNIFICANT CHANGE UP (ref 0–2)
BILIRUB SERPL-MCNC: 2.7 MG/DL — HIGH (ref 0.2–1.2)
BLD GP AB SCN SERPL QL: NEGATIVE — SIGNIFICANT CHANGE UP
BUN SERPL-MCNC: 48 MG/DL — HIGH (ref 7–23)
BUN SERPL-MCNC: 48 MG/DL — HIGH (ref 7–23)
CALCIUM SERPL-MCNC: 8.1 MG/DL — LOW (ref 8.4–10.5)
CALCIUM SERPL-MCNC: 9.1 MG/DL — SIGNIFICANT CHANGE UP (ref 8.4–10.5)
CHLORIDE SERPL-SCNC: 91 MMOL/L — LOW (ref 96–108)
CHLORIDE SERPL-SCNC: 96 MMOL/L — SIGNIFICANT CHANGE UP (ref 96–108)
CO2 SERPL-SCNC: 25 MMOL/L — SIGNIFICANT CHANGE UP (ref 22–31)
CO2 SERPL-SCNC: 27 MMOL/L — SIGNIFICANT CHANGE UP (ref 22–31)
CREAT SERPL-MCNC: 0.62 MG/DL — SIGNIFICANT CHANGE UP (ref 0.5–1.3)
CREAT SERPL-MCNC: 0.68 MG/DL — SIGNIFICANT CHANGE UP (ref 0.5–1.3)
EGFR: 103 ML/MIN/1.73M2 — SIGNIFICANT CHANGE UP
EGFR: 106 ML/MIN/1.73M2 — SIGNIFICANT CHANGE UP
EOSINOPHIL # BLD AUTO: 0.05 K/UL — SIGNIFICANT CHANGE UP (ref 0–0.5)
EOSINOPHIL NFR BLD AUTO: 0.7 % — SIGNIFICANT CHANGE UP (ref 0–6)
GLUCOSE SERPL-MCNC: 122 MG/DL — HIGH (ref 70–99)
GLUCOSE SERPL-MCNC: 129 MG/DL — HIGH (ref 70–99)
HCT VFR BLD CALC: 26.5 % — LOW (ref 34.5–45)
HCT VFR BLD CALC: 29.7 % — LOW (ref 34.5–45)
HGB BLD-MCNC: 10 G/DL — LOW (ref 11.5–15.5)
HGB BLD-MCNC: 9.2 G/DL — LOW (ref 11.5–15.5)
IMM GRANULOCYTES NFR BLD AUTO: 0.5 % — SIGNIFICANT CHANGE UP (ref 0–0.9)
INR BLD: 2.19 RATIO — HIGH (ref 0.85–1.18)
INR BLD: 3.68 RATIO — HIGH (ref 0.85–1.18)
LYMPHOCYTES # BLD AUTO: 1.12 K/UL — SIGNIFICANT CHANGE UP (ref 1–3.3)
LYMPHOCYTES # BLD AUTO: 15.1 % — SIGNIFICANT CHANGE UP (ref 13–44)
MAGNESIUM SERPL-MCNC: 1.9 MG/DL — SIGNIFICANT CHANGE UP (ref 1.6–2.6)
MCHC RBC-ENTMCNC: 33.7 GM/DL — SIGNIFICANT CHANGE UP (ref 32–36)
MCHC RBC-ENTMCNC: 34.1 PG — HIGH (ref 27–34)
MCHC RBC-ENTMCNC: 34.4 PG — HIGH (ref 27–34)
MCHC RBC-ENTMCNC: 34.7 GM/DL — SIGNIFICANT CHANGE UP (ref 32–36)
MCV RBC AUTO: 102.1 FL — HIGH (ref 80–100)
MCV RBC AUTO: 98.1 FL — SIGNIFICANT CHANGE UP (ref 80–100)
MONOCYTES # BLD AUTO: 0.69 K/UL — SIGNIFICANT CHANGE UP (ref 0–0.9)
MONOCYTES NFR BLD AUTO: 9.3 % — SIGNIFICANT CHANGE UP (ref 2–14)
NEUTROPHILS # BLD AUTO: 5.45 K/UL — SIGNIFICANT CHANGE UP (ref 1.8–7.4)
NEUTROPHILS NFR BLD AUTO: 73.5 % — SIGNIFICANT CHANGE UP (ref 43–77)
NRBC # BLD: 0 /100 WBCS — SIGNIFICANT CHANGE UP (ref 0–0)
NRBC # BLD: 0 /100 WBCS — SIGNIFICANT CHANGE UP (ref 0–0)
PHOSPHATE SERPL-MCNC: 2.8 MG/DL — SIGNIFICANT CHANGE UP (ref 2.5–4.5)
PLATELET # BLD AUTO: 105 K/UL — LOW (ref 150–400)
PLATELET # BLD AUTO: 157 K/UL — SIGNIFICANT CHANGE UP (ref 150–400)
POTASSIUM SERPL-MCNC: 3.9 MMOL/L — SIGNIFICANT CHANGE UP (ref 3.5–5.3)
POTASSIUM SERPL-MCNC: 5.9 MMOL/L — HIGH (ref 3.5–5.3)
POTASSIUM SERPL-SCNC: 3.9 MMOL/L — SIGNIFICANT CHANGE UP (ref 3.5–5.3)
POTASSIUM SERPL-SCNC: 5.9 MMOL/L — HIGH (ref 3.5–5.3)
PROT SERPL-MCNC: 6.7 G/DL — SIGNIFICANT CHANGE UP (ref 6–8.3)
PROTHROM AB SERPL-ACNC: 22.5 SEC — HIGH (ref 9.5–13)
PROTHROM AB SERPL-ACNC: 39 SEC — HIGH (ref 9.5–13)
RBC # BLD: 2.7 M/UL — LOW (ref 3.8–5.2)
RBC # BLD: 2.91 M/UL — LOW (ref 3.8–5.2)
RBC # FLD: 14.6 % — HIGH (ref 10.3–14.5)
RBC # FLD: 15.4 % — HIGH (ref 10.3–14.5)
RH IG SCN BLD-IMP: POSITIVE — SIGNIFICANT CHANGE UP
SODIUM SERPL-SCNC: 131 MMOL/L — LOW (ref 135–145)
SODIUM SERPL-SCNC: 136 MMOL/L — SIGNIFICANT CHANGE UP (ref 135–145)
WBC # BLD: 7.42 K/UL — SIGNIFICANT CHANGE UP (ref 3.8–10.5)
WBC # BLD: 8.27 K/UL — SIGNIFICANT CHANGE UP (ref 3.8–10.5)
WBC # FLD AUTO: 7.42 K/UL — SIGNIFICANT CHANGE UP (ref 3.8–10.5)
WBC # FLD AUTO: 8.27 K/UL — SIGNIFICANT CHANGE UP (ref 3.8–10.5)

## 2023-08-16 PROCEDURE — 99223 1ST HOSP IP/OBS HIGH 75: CPT

## 2023-08-16 PROCEDURE — 99223 1ST HOSP IP/OBS HIGH 75: CPT | Mod: GC

## 2023-08-16 PROCEDURE — 93010 ELECTROCARDIOGRAM REPORT: CPT

## 2023-08-16 PROCEDURE — ZZZZZ: CPT

## 2023-08-16 PROCEDURE — 99291 CRITICAL CARE FIRST HOUR: CPT | Mod: GC

## 2023-08-16 PROCEDURE — 99223 1ST HOSP IP/OBS HIGH 75: CPT | Mod: FS

## 2023-08-16 RX ORDER — ONDANSETRON 8 MG/1
4 TABLET, FILM COATED ORAL ONCE
Refills: 0 | Status: COMPLETED | OUTPATIENT
Start: 2023-08-16 | End: 2023-08-16

## 2023-08-16 RX ORDER — SODIUM,POTASSIUM PHOSPHATES 278-250MG
1 POWDER IN PACKET (EA) ORAL
Refills: 0 | Status: COMPLETED | OUTPATIENT
Start: 2023-08-16 | End: 2023-08-16

## 2023-08-16 RX ORDER — SPIRONOLACTONE 25 MG/1
25 TABLET, FILM COATED ORAL ONCE
Refills: 0 | Status: COMPLETED | OUTPATIENT
Start: 2023-08-16 | End: 2023-08-16

## 2023-08-16 RX ORDER — MAGNESIUM SULFATE 500 MG/ML
2 VIAL (ML) INJECTION ONCE
Refills: 0 | Status: COMPLETED | OUTPATIENT
Start: 2023-08-16 | End: 2023-08-16

## 2023-08-16 RX ORDER — TRANEXAMIC ACID 100 MG/ML
5 INJECTION, SOLUTION INTRAVENOUS ONCE
Refills: 0 | Status: COMPLETED | OUTPATIENT
Start: 2023-08-16 | End: 2023-08-16

## 2023-08-16 RX ORDER — PROTHROMBIN COMPLEX CONCENTRATE (HUMAN) 25.5; 16.5; 24; 22; 22; 26 [IU]/ML; [IU]/ML; [IU]/ML; [IU]/ML; [IU]/ML; [IU]/ML
1500 POWDER, FOR SOLUTION INTRAVENOUS ONCE
Refills: 0 | Status: COMPLETED | OUTPATIENT
Start: 2023-08-16 | End: 2023-08-16

## 2023-08-16 RX ORDER — CHLORHEXIDINE GLUCONATE 213 G/1000ML
1 SOLUTION TOPICAL
Refills: 0 | Status: DISCONTINUED | OUTPATIENT
Start: 2023-08-16 | End: 2023-08-18

## 2023-08-16 RX ORDER — MAGNESIUM SULFATE 500 MG/ML
2 VIAL (ML) INJECTION ONCE
Refills: 0 | Status: COMPLETED | OUTPATIENT
Start: 2023-08-16 | End: 2023-08-17

## 2023-08-16 RX ORDER — PHYTONADIONE (VIT K1) 5 MG
10 TABLET ORAL ONCE
Refills: 0 | Status: COMPLETED | OUTPATIENT
Start: 2023-08-16 | End: 2023-08-16

## 2023-08-16 RX ORDER — ACETAMINOPHEN 500 MG
975 TABLET ORAL EVERY 6 HOURS
Refills: 0 | Status: DISCONTINUED | OUTPATIENT
Start: 2023-08-16 | End: 2023-08-16

## 2023-08-16 RX ORDER — SPIRONOLACTONE 25 MG/1
25 TABLET, FILM COATED ORAL DAILY
Refills: 0 | Status: DISCONTINUED | OUTPATIENT
Start: 2023-08-17 | End: 2023-08-17

## 2023-08-16 RX ORDER — METOPROLOL TARTRATE 50 MG
25 TABLET ORAL EVERY 12 HOURS
Refills: 0 | Status: DISCONTINUED | OUTPATIENT
Start: 2023-08-16 | End: 2023-08-17

## 2023-08-16 RX ORDER — METOPROLOL TARTRATE 50 MG
25 TABLET ORAL ONCE
Refills: 0 | Status: DISCONTINUED | OUTPATIENT
Start: 2023-08-16 | End: 2023-08-16

## 2023-08-16 RX ORDER — ACETAMINOPHEN 500 MG
975 TABLET ORAL EVERY 6 HOURS
Refills: 0 | Status: DISCONTINUED | OUTPATIENT
Start: 2023-08-16 | End: 2023-08-18

## 2023-08-16 RX ORDER — ONDANSETRON 8 MG/1
4 TABLET, FILM COATED ORAL ONCE
Refills: 0 | Status: DISCONTINUED | OUTPATIENT
Start: 2023-08-16 | End: 2023-08-16

## 2023-08-16 RX ADMIN — ONDANSETRON 4 MILLIGRAM(S): 8 TABLET, FILM COATED ORAL at 06:52

## 2023-08-16 RX ADMIN — TRANEXAMIC ACID 5 MILLILITER(S): 100 INJECTION, SOLUTION INTRAVENOUS at 06:11

## 2023-08-16 RX ADMIN — SPIRONOLACTONE 25 MILLIGRAM(S): 25 TABLET, FILM COATED ORAL at 15:23

## 2023-08-16 RX ADMIN — Medication 1 TABLET(S): at 12:45

## 2023-08-16 RX ADMIN — Medication 25 GRAM(S): at 10:29

## 2023-08-16 RX ADMIN — PROTHROMBIN COMPLEX CONCENTRATE (HUMAN) 400 INTERNATIONAL UNIT(S): 25.5; 16.5; 24; 22; 22; 26 POWDER, FOR SOLUTION INTRAVENOUS at 06:26

## 2023-08-16 RX ADMIN — Medication 1 TABLET(S): at 17:38

## 2023-08-16 RX ADMIN — ONDANSETRON 4 MILLIGRAM(S): 8 TABLET, FILM COATED ORAL at 06:11

## 2023-08-16 RX ADMIN — Medication 20 MILLIGRAM(S): at 15:23

## 2023-08-16 RX ADMIN — Medication 102 MILLIGRAM(S): at 06:40

## 2023-08-16 NOTE — ED PROVIDER NOTE - CLINICAL SUMMARY MEDICAL DECISION MAKING FREE TEXT BOX
Attending Polina:  54 y/o f hx of afib on coumadin, mr, had dental extraction, rl tooth yesterday brought in for persistent bleeding from area, she has not had easy bleeding before, last coum level reported around 2.5, held coum before procedure, pt w/out cp ,sob, but seems tired, while being evaluated had large episode of emesis, airway equipment set up, plan to control beeding, surgicel, txa, emergent dental consult ,may reverse coumadin .

## 2023-08-16 NOTE — ED PROVIDER NOTE - PROGRESS NOTE DETAILS
Attending Polina:  called to room as pt vomited large amount of blood, spent perissent time in room, squirted txa, surgicel however pt not tolerating well dt condition and baseline mr likely, bp Is soft and continuing to ooze w/ difficulty controlling bleeding, airway equip was set up earlier, not tolerating preox w/ nc, decision made to reverese coumadin, kcentra iv vit k, dental paged . dental bs,holding pressure, inr 3.68, goc discussion via resident, mtp called, surg att trying to contact sicu. airway currently stable, no repeat vomiting Attending Polina:  dr yusuf accepted to sicu Attending Masom:  pt currently hd stable, maps 60s, tolerating packing by dental well. to go up to sicu shortly Lidia Montoya- summary:   After large hematemesis, IV access Extine wished, 2 units of emergent blood released.  Given patient's cardiac history, first unit of PRBC run over 30 minutes.  However patient systolics still in 80s, occasionally dipping to 70s.  Patient mentating well throughout, following commands although slightly sleepier.  Second unit PRBC started while dental holding pressure.  MTP activated at this time.  Platelets also given.  Patient still bleeding.  Clarified goals of care with mother who makes decisions for patient, patient is full code.  Surgery to admit to SICU.

## 2023-08-16 NOTE — CONSULT NOTE ADULT - ASSESSMENT
Dominique Dinh  has a rich cardiac history congenital heart disease cardiomyopathy atrial fibrillation status post mitral valve repair and 2 tricuspid valve procedures most recent valve in valve with recent edema and signs of right heart failure relatively stable on torsemide beta-blocker with holding ramipril.  She is now admitted after a dental procedure and profound bleeding from the site secondary to elevated INR from her Coumadin.  Her blood pressure is somewhat soft and she is anemic and clearly had blood loss from the procedure  .    Patient is in the surgical ICU for careful monitoring.      Recommend   dental follow-up careful monitoring of hemoglobin hematocrit    restart outpatient medication as blood pressure will  tolerate   restart Coumadin when no risk of increased bleeding

## 2023-08-16 NOTE — CONSULT NOTE ADULT - SUBJECTIVE AND OBJECTIVE BOX
HISTORY OF PRESENT ILLNESS:  BANDAR VAZ is a 52yo F w/ pmhx of Afib on Coumadin (last dose 8/14/23 7.5mg), Biventricular dysfunction w/ MV & TV replacement (2015 @ [facility]), developmental delay, HTN, Anemia, Graves DX, Hypothyroid, Anemia, presents to the ED around 5:45am with excessive bleeding in her mouth. Patient has a developmental disability and HPI was presented by her mother. Patient had #31 and #32 extractions by an oral surgeon at 10am on 8/15/23 and hemostasis was achieved upon completion of the procedure. After her lunch around 11:30am, bleeding started and did not stop since then. Bleeding worsened overnight. Upon arrival to the ED, patient vomited which contained a lot of blood. A massive transfusion protocol was called and the patient was transfused 2u pRBCs. Dental providers packed the wound and the patient was transferred to the SICU for hemodynamic monitoring.    PAST MEDICAL HISTORY:     Chronic atrial fibrillation    Hypothyroid    Hypertension    Severe tricuspid regurgitation    Anemia    Developmental delay      PAST SURGICAL HISTORY:     S/P mitral valve replacement    H/O tricuspid valve repair        FAMILY HISTORY:     SOCIAL HISTORY:    CODE STATUS:     HOME MEDICATIONS:    ALLERGIES: Keflex (Unknown)  strawberry (Hives)      VITAL SIGNS:  ICU Vital Signs Last 24 Hrs  T(C): 36.2 (16 Aug 2023 15:00), Max: 36.7 (16 Aug 2023 06:00)  T(F): 97.1 (16 Aug 2023 15:00), Max: 98 (16 Aug 2023 06:00)  HR: 100 (16 Aug 2023 17:00) (86 - 110)  BP: 103/55 (16 Aug 2023 17:00) (88/57 - 106/71)  BP(mean): 73 (16 Aug 2023 17:00) (64 - 83)  ABP: --  ABP(mean): --  RR: 19 (16 Aug 2023 17:00) (14 - 28)  SpO2: 99% (16 Aug 2023 17:00) (95% - 100%)    O2 Parameters below as of 16 Aug 2023 08:44  Patient On (Oxygen Delivery Method): room air        NEURO  Exam: AOx4, appropriate affect  Meds: acetaminophen   Tablet .. 975 milliGRAM(s) Oral every 6 hours PRN Mild Pain (1 - 3)    RESPIRATORY  Exam: unlabored respirations on RA, CTA b/l. No wheezing, rales, rhonchi appreciated.     CARDIOVASCULAR  Exam: S1S2. No murmurs, rubs, gallops appreciated.   Cardiac Rhythm: NSR.  Meds:metoprolol tartrate 25 milliGRAM(s) Oral every 12 hours      GI/NUTRITION  Exam: Soft, non distended, non-tender.  Diet: regular  Meds:    GENITOURINARY/RENAL    08-16 @ 07:01  -  08-16 @ 17:50  --------------------------------------------------------  IN:    IV PiggyBack: 50 mL    Oral Fluid: 720 mL  Total IN: 770 mL    OUT:    Voided (mL): 250 mL  Total OUT: 250 mL    Total NET: 520 mL        Weight (kg): 64.8 (08-16 @ 08:44)  08-16    136  |  96  |  48<H>  ----------------------------<  129<H>  3.9   |  27  |  0.62    Ca    8.1<L>      16 Aug 2023 09:33  Phos  2.8     08-16  Mg     1.9     08-16    TPro  6.7  /  Alb  4.3  /  TBili  2.7<H>  /  DBili  x   /  AST  44<H>  /  ALT  13  /  AlkPhos  61  08-16    [ ] Bartholomew catheter, indication: urine output monitoring in critically ill patient    HEMATOLOGIC  [ ] VTE Prophylaxis: on hold                          9.2    8.27  )-----------( 105      ( 16 Aug 2023 09:33 )             26.5     PT/INR - ( 16 Aug 2023 13:42 )   PT: 22.5 sec;   INR: 2.19 ratio     PTT - ( 16 Aug 2023 13:42 )  PTT:38.5 sec  Transfusion: [ ] PRBC	[ ] Platelets	[ ] FFP	[ ] Cryoprecipitate      INFECTIOUS DISEASES  Meds:amoxicillin  500 milliGRAM(s)/clavulanate 1 Tablet(s) Oral two times a day    RECENT CULTURES:      ENDOCRINE  Meds:  CAPILLARY BLOOD GLUCOSE          PATIENT CARE ACCESS DEVICES:  [x] Peripheral IV  [ ] Central Venous Line	[ ] R	[ ] L	[ ] IJ	[ ] Fem	[ ] SC	Placed:   [ ] Arterial Line		[ ] R	[ ] L	[ ] Fem	[ ] Rad	[ ] Ax	Placed:   [ ] PICC:					[ ] Mediport  [ ] Urinary Catheter, Date Placed:   [x] Necessity of urinary, arterial, and venous catheters discussed    OTHER MEDICATIONS: chlorhexidine 2% Cloths 1 Application(s) Topical <User Schedule>      IMAGING STUDIES:

## 2023-08-16 NOTE — H&P ADULT - ASSESSMENT
52yo F w/ PMHx of AFib on Coumadin (last dose 8/14/23 7.5mg), Biventricular dysfunction w/ MV & TV replacement (2015 @ [facility]), developmental delay, HTN, Anemia, Graves DX, Hypothyroid, Anemia, presents s/p tooth extraction w/ bleeding out her mouth.    Plan:  - Appreciate dental recs  - Monitor for bleeding, pack wound  - Transfuse if needed  - SICU consult  - Continue home meds  - Diet: CLD  - Hold warfarin (and dvt ppx)  - amoxicillin for dental  work w/ hx of heart valve replacement  - Pain control  - PT/OT  - Appreciate cardiology recs  - Appreciate wound care recs     Elias Delong,PGY2  [id number]

## 2023-08-16 NOTE — CONSULT NOTE ADULT - ASSESSMENT
52yo F w/ PMHx of AFib on Coumadin (last dose 8/14/23 7.5mg), Biventricular dysfunction w/ MV & TV replacement (2015 @ [facility]), developmental delay, HTN, Anemia, Graves DX, Hypothyroid, Anemia, presents s/p tooth extraction w/ bleeding out her mouth.    PLAN:   Neurologic:  - developmental delay  - AOx4    Respiratory:    Cardiovascular:    Gastrointestinal/Nutrition:    Genitourinary/Renal:    Hematologic:    Infectious Disease:    Endocrine:    Disposition: full 54yo F w/ PMHx of AFib on Coumadin (last dose 8/14/23 7.5mg), Biventricular dysfunction w/ MV & TV replacement (2015 @ [facility]), developmental delay, HTN, Anemia, Graves DX, Hypothyroid, Anemia, presents s/p tooth extraction w/ bleeding out her mouth.    PLAN:   Neurologic:  - developmental delay  - AOx4  - pain control: tylenol    Respiratory:  - RA, satting well    Cardiovascular:  - biventricular dysfunction; hx of MV and TV repair  - HDS  - continue home metoprolol & spirinolactone    Gastrointestinal/Nutrition:  - CLD    Genitourinary/Renal:  - On home Torsemide  - strict I&Os and replete electrolytes    Hematologic:  - holding home coumadin  - holding chemical VTE ppx  - SCDs    Infectious Disease:  - Augmentin for s/p tooth extraction    Endocrine:  - Monitor glucose    Disposition: SICU

## 2023-08-16 NOTE — ED ADULT NURSE REASSESSMENT NOTE - NS ED NURSE REASSESS COMMENT FT1
Pt BP continues to down trend SBP dipping into 70s. MTP ordered for pt. Two units of PRBCs already given. Pt given KCentra and Vitamin K. Pt still mentating, zofran given for additional vomiting. Other vitals stable.

## 2023-08-16 NOTE — ED ADULT NURSE REASSESSMENT NOTE - NS ED NURSE REASSESS COMMENT FT1
Patient admitted to SICU. Patient remains at baseline mental status, bleeding controlled. SICU contacted multiple times for resident to transport patient to 8ICU.

## 2023-08-16 NOTE — H&P ADULT - HISTORY OF PRESENT ILLNESS
54yo F w/ pmhx of Afib on Coumadin (last dose 8/14/23 7.5mg), Biventricular dysfunction w/ MV & TV replacement (2015 @ [facility]), developmental delay, HTN, Anemia, Graves DX, Hypothyroid, Anemia, presents to the ED around 5:45am with excessive bleeding in her mouth. Patient has a developmental disability and HPI was presented by her mother. Patient had #31 and #32 extractions by an oral surgeon at 10am on 8/15/23 and hemostasis was achieved upon completion of the procedure. After her lunch around 11:30am, bleeding started and did not stop since then. Bleeding worsened overnight. Upon arrival to the ED, patient vomited which contained a lot of blood. A massive transfusion protocol was called and the patient was transfused 2u pRBCs. Dental providers packed the wound and the patient was transferred to the SICU for hemodynamic monitoring.

## 2023-08-16 NOTE — H&P ADULT - NSICDXPASTMEDICALHX_GEN_ALL_CORE_FT
PAST MEDICAL HISTORY:  Anemia     Chronic atrial fibrillation     Developmental delay     Hypertension     Hypothyroid     Severe tricuspid regurgitation

## 2023-08-16 NOTE — H&P ADULT - NSHPLABSRESULTS_GEN_ALL_CORE
amoxicillin  500 milliGRAM(s)/clavulanate 1 Tablet(s) Oral two times a day  metoprolol tartrate 25 milliGRAM(s) Oral every 12 hours    MEDICATIONS  (PRN):  acetaminophen     Tablet .. 975 milliGRAM(s) Oral every 6 hours PRN Mild Pain (1 - 3)      I&O's Detail    16 Aug 2023 07:01  -  16 Aug 2023 16:42  --------------------------------------------------------  IN:    IV PiggyBack: 50 mL    Oral Fluid: 240 mL  Total IN: 290 mL    OUT:  Total OUT: 0 mL    Total NET: 290 mL          Vital Signs Last 24 Hrs  T(C): 36.2 (16 Aug 2023 15:00), Max: 36.7 (16 Aug 2023 06:00)  T(F): 97.1 (16 Aug 2023 15:00), Max: 98 (16 Aug 2023 06:00)  HR: 91 (16 Aug 2023 16:00) (86 - 110)  BP: 89/50 (16 Aug 2023 16:00) (88/57 - 106/71)  BP(mean): 64 (16 Aug 2023 16:00) (64 - 83)  RR: 15 (16 Aug 2023 16:00) (14 - 28)  SpO2: 98% (16 Aug 2023 16:00) (95% - 100%)    Parameters below as of 16 Aug 2023 08:44  Patient On (Oxygen Delivery Method): room air            LABS:                        9.2    8.27  )-----------( 105      ( 16 Aug 2023 09:33 )             26.5     08-16    136  |  96  |  48<H>  ----------------------------<  129<H>  3.9   |  27  |  0.62    Ca    8.1<L>      16 Aug 2023 09:33  Phos  2.8     08-16  Mg     1.9     08-16    TPro  6.7  /  Alb  4.3  /  TBili  2.7<H>  /  DBili  x   /  AST  44<H>  /  ALT  13  /  AlkPhos  61  08-16    PT/INR - ( 16 Aug 2023 13:42 )   PT: 22.5 sec;   INR: 2.19 ratio         PTT - ( 16 Aug 2023 13:42 )  PTT:38.5 sec  Urinalysis Basic - ( 16 Aug 2023 09:33 )    Color: x / Appearance: x / SG: x / pH: x  Gluc: 129 mg/dL / Ketone: x  / Bili: x / Urobili: x   Blood: x / Protein: x / Nitrite: x   Leuk Esterase: x / RBC: x / WBC x   Sq Epi: x / Non Sq Epi: x / Bacteria: x        RADIOLOGY & ADDITIONAL STUDIES:

## 2023-08-16 NOTE — CONSULT NOTE ADULT - SUBJECTIVE AND OBJECTIVE BOX
patient is well-known to me followed carefully for congenital heart disease cardiomyopathy status post recent tricuspid valve and valve procedure.  She was admitted after dental work and severe bleeding nausea vomiting and blood loss.    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  She has chronic atrial fibrillation.  She recently developed progressive tricuspid insufficiency and had a valve in valve placed prosthetic valve in the tricuspid position by structural heart team.  Subsequently she has had evidence of some right heart failure with edema weight gain requiring diuretics increased dose.  She has chronic atrial fibrillation which has been well controlled.  Recently her blood pressure has been running about 100-110 and ramipril  was held.      She developed severe pain in her mouth with a tooth infection and yesterday had 2 teeth removed.  Subsequent to that she had profound bleeding and came to the emergency room.  She had nausea and vomiting.  She has had a fall in hemoglobin and hematocrit and feels weak and tired..  She denies chest pain or chest pressure..  She denies shortness of breath.  It appears the bleeding has stopped.  MEDICATIONS:  MEDICATIONS  (STANDING):  amoxicillin  500 milliGRAM(s)/clavulanate 1 Tablet(s) Oral two times a day  chlorhexidine 2% Cloths 1 Application(s) Topical <User Schedule>  metoprolol tartrate 25 milliGRAM(s) Oral every 12 hours  potassium phosphate / sodium phosphate Tablet (K-PHOS No. 2) 1 Tablet(s) Oral three times a day before meals  spironolactone 25 milliGRAM(s) Oral once  torsemide 20 milliGRAM(s) Oral once      PHYSICAL EXAM:  T(C): 36.6 (08-16-23 @ 11:00), Max: 36.7 (08-16-23 @ 06:00)  HR: 91 (08-16-23 @ 12:00) (86 - 110)  BP: 93/51 (08-16-23 @ 12:00) (88/57 - 100/63)  RR: 17 (08-16-23 @ 12:00) (14 - 21)  SpO2: 97% (08-16-23 @ 12:00) (97% - 100%)  Wt(kg): --  I&O's Summary    16 Aug 2023 07:01  -  16 Aug 2023 14:21  --------------------------------------------------------  IN: 50 mL / OUT: 0 mL / NET: 50 mL      Height (cm): 157.5 (08-16 @ 08:44)  Weight (kg): 64.8 (08-16 @ 08:44)  BMI (kg/m2): 26.1 (08-16 @ 08:44)  BSA (m2): 1.66 (08-16 @ 08:44)    Appearance: Normal	  HEENT:   Normal oral mucosa, PERRL, EOMI	  Cardiovascular: Normal S1 S2, No JVD, No murmurs ,  Respiratory: Lungs clear to auscultation, normal effort 	  Gastrointestinal:  Soft, Non-tender, + BS	  Skin: No rashes, No ecchymoses, No cyanosis, warm to touch  Musculoskeletal: Normal range of motion, normal strength  Psychiatry:  Mood & affect appropriate  Ext: No edema  Peripheral pulses palpable 2+ bilaterally      LABS:    CARDIAC MARKERS:                                9.2    8.27  )-----------( 105      ( 16 Aug 2023 09:33 )             26.5     08-16    136  |  96  |  48<H>  ----------------------------<  129<H>  3.9   |  27  |  0.62    Ca    8.1<L>      16 Aug 2023 09:33  Phos  2.8     08-16  Mg     1.9     08-16    TPro  6.7  /  Alb  4.3  /  TBili  2.7<H>  /  DBili  x   /  AST  44<H>  /  ALT  13  /  AlkPhos  61  08-16    proBNP:   Lipid Profile:   HgA1c:   TSH:           TELEMETRY: 	    ECG:  	  RADIOLOGY:   DIAGNOSTIC TESTING:  [ ] Echocardiogram:  [ ]  Catheterization:  [ ] Stress Test:    OTHER: 	            patient is well-known to me followed carefully for congenital heart disease cardiomyopathy status post recent tricuspid valve and valve procedure.  She was admitted after dental work and severe bleeding nausea vomiting and blood loss.    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  She has chronic atrial fibrillation.  She recently developed progressive tricuspid insufficiency and had a valve in valve placed prosthetic valve in the tricuspid position by structural heart team.  Subsequently she has had evidence of some right heart failure with edema weight gain requiring diuretics increased dose.  She has chronic atrial fibrillation which has been well controlled.  Recently her blood pressure has been running about 100-110 and ramipril  was held.      She developed severe pain in her mouth with a tooth infection and yesterday had 2 teeth removed.  Subsequent to that she had profound bleeding and came to the emergency room.  She had nausea and vomiting.  She has had a fall in hemoglobin and hematocrit and feels weak and tired..  She denies chest pain or chest pressure..  She denies shortness of breath.  It appears the bleeding has stopped.  Her hemoglobin and hematocrit have decreased.  Hematocrit 29   other than feeling weak and tired she is relatively asymptomatic with no shortness of breath.  She remains in atrial fibrillation  MEDICATIONS:  MEDICATIONS  (STANDING):  amoxicillin  500 milliGRAM(s)/clavulanate 1 Tablet(s) Oral two times a day  chlorhexidine 2% Cloths 1 Application(s) Topical <User Schedule>  metoprolol tartrate 25 milliGRAM(s) Oral every 12 hours  potassium phosphate / sodium phosphate Tablet (K-PHOS No. 2) 1 Tablet(s) Oral three times a day before meals  spironolactone 25 milliGRAM(s) Oral once  torsemide 20 milliGRAM(s) Oral once      PHYSICAL EXAM:  T(C): 36.6 (08-16-23 @ 11:00), Max: 36.7 (08-16-23 @ 06:00)  HR: 91 (08-16-23 @ 12:00) (86 - 110)  BP: 93/51 (08-16-23 @ 12:00) (88/57 - 100/63)  RR: 17 (08-16-23 @ 12:00) (14 - 21)  SpO2: 97% (08-16-23 @ 12:00) (97% - 100%)  Wt(kg): --  I&O's Summary    16 Aug 2023 07:01  -  16 Aug 2023 14:21  --------------------------------------------------------  IN: 50 mL / OUT: 0 mL / NET: 50 mL      Height (cm): 157.5 (08-16 @ 08:44)  Weight (kg): 64.8 (08-16 @ 08:44)  BMI (kg/m2): 26.1 (08-16 @ 08:44)  BSA (m2): 1.66 (08-16 @ 08:44)    Appearance: Normal	 appears weak and tired but offers no significant complaints  HEENT:    right side of mouth is swollen and packed  Cardiovascular: Normal S1 S2,  irregularly irregular 2/6 murmur at the apex  moderate JVD  Respiratory: Lungs clear to auscultation, normal effort 	  Gastrointestinal:  Soft, Non-tender, + BS	  Skin: No rashes, No ecchymoses, No cyanosis, warm to touch  Musculoskeletal: Normal range of motion, normal strength  Psychiatry:  Mood & affect appropriate patient in good spirit  Ext: 2+ pitting edema        LABS:    CARDIAC MARKERS:                                9.2    8.27  )-----------( 105      ( 16 Aug 2023 09:33 )             26.5     08-16    136  |  96  |  48<H>  ----------------------------<  129<H>  3.9   |  27  |  0.62    Ca    8.1<L>      16 Aug 2023 09:33  Phos  2.8     08-16  Mg     1.9     08-16    TPro  6.7  /  Alb  4.3  /  TBili  2.7<H>  /  DBili  x   /  AST  44<H>  /  ALT  13  /  AlkPhos  61  08-16    proBNP:   Lipid Profile:   HgA1c:   TSH:           TELEMETRY: 	    ECG:  atrial fibrillation right bundle branch block 	  RADIOLOGY:   DIAGNOSTIC TESTING:  < from: TTE W or WO Ultrasound Enhancing Agent (06.22.23 @ 11:38) >  CONCLUSIONS:      1. Moderately dilated left ventricular cavity size. The left ventricular systolic function is moderately decreased with anejection fraction of 29 % by 3D. There is global left ventricular hypokinesis.   2. Analysis of left ventricular diastolic function and filling pressure is made challenging by the presence of mitral annuloplasty ring.   3. Severely enlarged right ventricular cavity size and moderately reduced systolic function.   4. The left atrium is severely dilated.   5. The right atrium is severely dilated.   6. An annuloplasty ring is noted in the mitral position. There is moderate intravalvular regurgitation.   7. Transcatheter heart valve inside surgical bioprosthesis in the tricuspid position. Well seated tricuspid prosthesis with normal function. Prosthetic tricuspid valve leaflets are not well visualized. Trans-tricuspid gradients (peak gradient of 11.0 mmHg)(mean gradient of 3.0 mmHg) at a heart rate of 92 bpm are normal. No intravalvular regurgitation of the tricuspid valve prosthesis. No paravalvular regurgitation.   8. No pericardial effusion seen.   9. Compared to the transthoracic echocardiogram performed on 5/20/2023 LVEF has decreased. There is moderate mitral regurgitation. The tricuspid valve appears normal in function.    ________________________________________________________________________________________  FINDINGS:     Left Ventricle:  The left ventricular wall thickness is normal. The left ventricular systolic function is moderately decreased with a calculated ejection fraction of 29 % by 3D. There is global left ventricular hypokinesis. Analysis of left ventricular diastolic function and filling pressure is made challenging by the presence of mitral annuloplasty ring. There is increased LV mass and eccentric hypertrophy.     Right Ventricle:  Severely enlarged right ventricular cavity size and moderately reduced systolic function. Tricuspid annular plane systolic excursion (TAPSE) is 1.7 cm (normal >=1.7 cm).     Left Atrium:  The left atrium is severely dilated.     Right Atrium:  The right atrium is severely dilated with an indexed volume of 85.43 ml/m².     Mitral Valve:  An annuloplasty ring is noted in the mitral position. Moderate mitral intravalvular regurgitation. The MR EROA is 0.34 cm2 and the regurgitant volume is 39 mL/beat. The MR vena contracta is 0.59 cm.     Tricuspid Valve:  Transcatheter heart valve inside surgical bioprosthesis in the tricuspid position. The tricuspid prosthesis is well seated with normal function. Prosthetic tricuspid valve leaflets are not well visualized. Transtricuspid gradients peak gradient of 11.0 mmHg and mean gradient of 3.0 mmHg at a heart rate of 92 bpm are normal. There is no intravalvular regurgitation of the tricuspid valve prosthesis. There is no paravalvular regurgitation.     Aorta:  Normal aorta sinus of Valsalva, measuring 3.10 cm (indexed 1.71 cm/m²). Normal proximal ascending aorta, measuring 2.90 cm (indexed 1.60 cm/m²).     Pericardium:  No pericardial effusion seen.     Systemic Veins:  The inferior vena cava is dilated measuring 2.20 cm in diameter, (dilated >2.1cm) with normal inspiratory collapse (normal >50%) consistent with mildly elevated right atrial pressure (~8, range 5-10mmHg).  ____________________________________________________________________  QUANTITATIVE DATA  Left Ventricle Measurements                         Indexed BSA  IVSd (2D):   0.7 cm  LVPWd (2D):  0.8 cm  LVIDd (2D):  6.0 cm  LVIDs (2D):  4.9 cm  LV Mass:     173 g   95.3 g/m²  3D LV EF%: 29 %     MV E Vmax:    2.02 m/s  e' lateral:   9.79 cm/s  e' medial:    3.88 cm/s  E/e' lateral: 20.63  E/e' medial:52.06  E/e' Average: 29.55  MV DT:        244 msec    Aorta Measurements     Ao Sinus:    3.1 cm  Ao Asc prox: 2.90 cm       Left Atrium Measurements     LA Diam 2D: 6.00 cm    Right Ventricle Measurements Right Atrial Measurements     TAPSE:      1.7 cm           RA Vol:       155.00 ml  TV [place]. S': 3.49 cm/s        RA Vol Index: 85.43 ml/m²       LVOT / RVOT/ Qp/Qs Data:  LVOT Diameter:   2.00 cm  LVOT Vmax:       0.47 m/s  LVOT VTI:        7.93 cm  LVOT SV:         24.9 ml  LVOT SV Indexed: 13.73 ml/m²    Mitral Valve Measurements     MV Vmax:      1.92 m/s    MR Vmax:           4.03 m/s  MV Mean Grad: 4.00 mmHg   MR VTI:            99.65 cm  MV Peak Grad: 14.7 mmHg   MR Mean Gradient:  39.0 mmHg  MV E Vmax:    2.0 m/s     MR Peak Gradient:  65.0 mmHg                            MR VTI:            99.65 cm                            MR Vena Contracta: 0.5 cm                               MR PISA Radius:    0.80 cm                            MR Aliasing Atilio:   30.80 cm/s            MR Regurg Fract:   1.23 %       Tricuspid Valve Measurements     TV Vmax:          1.7 m/s  TV Peak Gradient: 11.0 mmHg  TV Mean Gradient: 3.0 mmHg  RA Pressure:      8 mmHg    ________________________________________________________________________________________  Diagnosing Physician: Shirlene Sanderson.  Electronically signed on 6/22/2023 at 5:19:10 PM by Shirlene Sanderson

## 2023-08-16 NOTE — PATIENT PROFILE ADULT - FALL HARM RISK - HARM RISK INTERVENTIONS

## 2023-08-16 NOTE — ED PROVIDER NOTE - OBJECTIVE STATEMENT
52yo F w/ hx Afib on Coumadin (last dose 5/15/23 7.5mg) developmental delay, HTN, Anemia, Graves DX, Hypothyroid, Anemia, MV repair  TV replacement 2015 @ [place] presenting with mouth bleeding. Patient had dental extraction right molar and wisdom tooth yesterday 10 AM with Dr. Pineda, Had held AC prior to surgery.  Surgical site has been bleeding since with.  On initial evaluation patient right lower gums.  Shortly after, patient started vomiting large amounts of blood clots.  Blood pressure systolics 90s.  Patient complaining of abdominal pain not feeling well.

## 2023-08-16 NOTE — ED PROVIDER NOTE - PHYSICAL EXAMINATION
General appearance: bleeding from mouth, afebrile    Eyes: anicteric sclerae, ADONIS, EOMI   HENT: Atraumatic; R lower gums bleeding   Neck: Trachea midline; Full range of motion, supple   Pulm: CTA bl, normal respiratory effort and no intercostal retractions, normal work of breathing   CV: RRR, No murmurs, rubs, or gallops. 2+ peripheral pulses.   Abdomen: Soft, non-tender, non-distended; no guarding or rebound   Extremities: ble pitting edema   Skin: Dry, normal temperature, turgor and texture; no rash, ulcers or subcutaneous nodules   Psych: Appropriate affect, cooperative

## 2023-08-16 NOTE — CONSULT NOTE ADULT - ATTENDING COMMENTS
alert and awake in no distress, no pain  has mental retardation  on RA, no respiratory distress  slight hypotension with SBP in 90s, likely her baseline based on her office visits  afib, bovine valve repairs, on warfarin 7.5 mg daily, last dose Monday night  TTE: biventricular dysfunction  restart home meds  NPO, except meds  discuss with dental about plan for oral bleeding  voiding freely, repeat K  restart home lasix  hold off chem VTE PPX, SCDs  s/p kcentra reversal, hold off restart for now, recheck coags  afebrile, off antibiotics  OOB as able  full code  updated mom and dad and herself at bedside about the plan    PIVs

## 2023-08-16 NOTE — ED ADULT NURSE NOTE - OBJECTIVE STATEMENT
Pt is a 53y F PMH valve replacement on AC p/w bleeding gums. Pt had two teeth extracted lower R jaw yesterday, with bleeding worsening today. Large clots noted. Pt c/o nausea. On arrival to ED treatment room pt started vomiting large quantities of blood. Pt placed on monitor, two large bore IVs placed, two units emergent release ordered and first unit hung. Pt is a 53y F PMH valve replacement on AC p/w bleeding gums. Pt had two teeth extracted lower R jaw yesterday, with bleeding worsening today. Large clots noted. Pt c/o nausea. On arrival to ED treatment room pt started vomiting large quantities of blood. Pt placed on monitor, two large bore IVs placed, two units emergent release ordered and first unit hung. Pt mentating, pressure held to lower gum with surgicell. A&Ox4, SINGLETARY, lungs clear, distal pulses intact, abdomen soft. Side rails up for safety, call bell and personal items within reach, instructed to call for assistance, verbalizes understanding. Will continue to monitor.

## 2023-08-16 NOTE — CONSULT NOTE ADULT - SUBJECTIVE AND OBJECTIVE BOX
Dominique Moran ([id number]), is a 53y old female who presents with a chief complaint of excessive bleeding after extractions. Patient accompanied with her mother. Dental consulted to evaluate her bleeding after extractions.    HPI: Patient was admitted to ED around 5:45am with excessive bleeding in her mouth. Patient has a developmental disability and HPI was presented by her mother. Patient had #31 and #32 extractions by an oral surgeon at 10am on 8/15/23 and hemostasis was achieved upon completion of the procedure. After her lunch around 11:30am, bleeding started and did not stop since then. Patient was on coumadin. Bleeding worsened overnight. Upon arrival to the ED, patient vomited which contained a lot of blood.      PAST MEDICAL & SURGICAL HISTORY:  Chronic atrial fibrillation    Hypothyroid    Hypertension    Severe tricuspid regurgitation    Anemia    S/P mitral valve replacement    H/O tricuspid valve repair    (+) heart valve replacement     MEDICATIONS  (STANDING):  amoxicillin  500 milliGRAM(s)/clavulanate 1 Tablet(s) Oral two times a day  chlorhexidine 2% Cloths 1 Application(s) Topical <User Schedule>  metoprolol tartrate 25 milliGRAM(s) Oral every 12 hours  potassium phosphate / sodium phosphate Tablet (K-PHOS No. 2) 1 Tablet(s) Oral three times a day before meals  spironolactone 25 milliGRAM(s) Oral once  torsemide 20 milliGRAM(s) Oral once    MEDICATIONS  (PRN):  acetaminophen     Tablet. 975 milliGRAM(s) Oral every 6 hours PRN Mild Pain (1 - 3)      Allergies  Keflex (Unknown)  strawberry (Hives)    *Last Dental Visit: 8/15/23 at [facility]       LABS:             WBC Count: 8.27 K/uL [3.80 - 10.50] (08-16 @ 09:33)  Platelet Count - Automated: 105 K/uL *L* [150 - 400] (08-16 @ 09:33)  WBC Count: 7.42 K/uL [3.80 - 10.50] (08-16 @ 06:18)  Platelet Count - Automated: 157 K/uL [150 - 400] (08-16 @ 06:18)  INR: 3.68 ratio *H* [0.85 - 1.18] (08-16 @ 06:18)    PT/INR - ( 16 Aug 2023 06:18 )   PT: 39.0 sec;   INR: 3.68 ratio    PTT - ( 16 Aug 2023 06:18 )  PTT:43.1 sec    EOE:               Mandible: FROM             Facial bones and MOM grossly intact             ( - ) trismus             ( - ) lymphadenopathy             ( - ) swelling             ( - ) asymmetry             ( + ) palpation – pain upon palpation on body of right mandible around tooth #31 and 32             ( - ) dyspnea             ( - ) dysphagia             ( - ) loss of consciousness    IOE:  permanent dentition: grossly intact           tongue/FOM: No pathology noted           labial/buccal mucosa: unable to assess due to patient’s inability to open mouth           percussion: unable to assess due to patient’s inability to open mouth           ( + ) palpation: pain upon palpation around extraction sites of #31 and 32           ( - ) swelling            ( - ) abscess           ( - ) sinus tract  Based on the record of the extraction procedure that was provided by federico’s mother, it is recorded as surgical extractions. However, no sutures or hemostatic material noted in the socket.     *DENTAL RADIOGRAPHS: unable to be taken due to patient was not transportable.     *ASSESSMENT: Excessive bleeding due to failure of hemostasis after extractions of #31 and 32.       PROCEDURE: Limited bedside exam performed with verbal consent from patient’s mother. Prior to the exam, it was informed that MBP, TXA, and medication to reverse coumadin have been given to the patient and Surgicel was placed in the extraction sites by the med team. Mouth prop placed. Firm pressure applied with 4x4 gauze for 30 minutes, but bleeding continued. 4x4 gauze soaked with 2 carpules of lidocaine with epinephrine 1:50K and applied it to the bleeding sites with firm pressure for 30 minutes and achieved hemostasis. Stayed with patient for another 30 minutes and confirmed that complete hemostasis was achieved. Then, visualized Surgicel packed in the extraction sites. Prepared 4x4 gauze soaked with lidocaine with epinephrine 1:50K and provided it to the med team and instruction was provided to the attending nurse. Extraction post-op instruction was provided to both med team and patient’s mother.     RECOMMENDATIONS:  1) Monitor the status of bleeding in the extraction sites of #31 and 32 and achieve hemostasis if bleeding recurs.   2) Discharge on pain meds as per ED Team.  3) Dental F/U with OMFS at [facility] ([phone]) OR outpatient oral surgeon for post-op of #31 and #32 OR [facility] dental clinic ([phone]).  4) Dental F/U with outpatient dentist for comprehensive dental care.    Dental resident: Lolita Sands DDS #[id number] 53y old female who presents to [facility] ED with a chief complaint of excessive bleeding after extractions. Patient accompanied with her mother. Dental consulted to evaluate her bleeding after extractions.    Dental HPI: Patient was admitted to ED around 5:45am with excessive bleeding in her mouth. Patient has a developmental disability and HPI was presented by her mother. Patient had #31 and #32 extractions by an oral surgeon at 10am on 8/15/23 and hemostasis was achieved upon completion of the procedure. After her lunch around 11:30am, bleeding started and did not stop since then. Patient was on coumadin. Bleeding worsened overnight. Upon arrival to the ED, patient vomited which contained a lot of blood.     PAST MEDICAL & SURGICAL HISTORY:  Chronic atrial fibrillation    Hypothyroid    Hypertension    Severe tricuspid regurgitation    Anemia    S/P mitral valve replacement    H/O tricuspid valve repair    (+) heart valve replacement    MEDICATIONS  (STANDING):  amoxicillin  500 milliGRAM(s)/clavulanate 1 Tablet(s) Oral two times a day  chlorhexidine 2% Cloths 1 Application(s) Topical <User Schedule>  metoprolol tartrate 25 milliGRAM(s) Oral every 12 hours  potassium phosphate / sodium phosphate Tablet (K-PHOS No. 2) 1 Tablet(s) Oral three times a day before meals  spironolactone 25 milliGRAM(s) Oral once  torsemide 20 milliGRAM(s) Oral once    MEDICATIONS  (PRN):  acetaminophen     Tablet. 975 milliGRAM(s) Oral every 6 hours PRN Mild Pain (1 - 3)      LABS:  WBC Count: 8.27 K/uL [3.80 - 10.50] (08-16 @ 09:33)  Platelet Count - Automated: 105 K/uL *L* [150 - 400] (08-16 @ 09:33)  WBC Count: 7.42 K/uL [3.80 - 10.50] (08-16 @ 06:18)  Platelet Count - Automated: 157 K/uL [150 - 400] (08-16 @ 06:18)  INR: 3.68 ratio *H* [0.85 - 1.18] (08-16 @ 06:18)  PT/INR - ( 16 Aug 2023 06:18 )   PT: 39.0 sec;   INR: 3.68 ratio  PTT - ( 16 Aug 2023 06:18 )  PTT:43.1 sec      Allergies  Keflex (Unknown)  strawberry (Hives)    *Last Dental Visit: 8/15/23 at [facility]    EOE:             Mandible: FROM            Facial bones and MOM grossly intact            ( - ) trismus            ( - ) lymphadenopathy            ( - ) swelling            ( - ) asymmetry            ( + ) palpation – pain upon palpation on body of right mandible around tooth #31 and 32            ( - ) dyspnea            ( - ) dysphagia            ( - ) loss of consciousness    IOE:  permanent dentition: grossly intact          tongue/FOM: No pathology noted          labial/buccal mucosa: unable to assess due to patient’s inability to open mouth          percussion: unable to assess due to patient’s inability to open mouth          ( + ) palpation: pain upon palpation around extraction sites of #31 and 32          ( - ) swelling           ( - ) abscess          ( - ) sinus tract  Based on the record of the extraction procedure that was provided by patient’s mother, it is recorded as surgical extractions.    *DENTAL RADIOGRAPHS: No rads, patient not transportable as per ED team    *ASSESSMENT: Bleeding due to failure of continued hemostasis after extractions of #31 and 32.      PROCEDURE: Limited bedside exam performed with verbal consent from patient’s mother. Prior to the exam, it was informed that MBP and medication to reverse coumadin have been given to the patient, TXA at ext sites, and Surgicel was placed in the extraction sites by the med team. Mouth prop placed. Firm pressure applied with 4x4 gauze for 30 minutes, without achieving hemostasis. 4x4 gauze soaked with 2 carpules of 2% lidocaine with epinephrine 1:50K and applied to the bleeding sites with firm pressure for 30 minutes and achieved hemostasis. Stayed with patient for another 30 minutes and confirmed that complete hemostasis was achieved. Prepared 4x4 gauze soaked with lidocaine with epinephrine 1:50K and provided it to the med team and instruction was provided to the attending nurse. Extraction post-op instruction was provided to both med team and patient’s mother.    RECOMMENDATIONS:  1) Monitor the status of bleeding in the extraction sites of #31 and 32 and achieve hemostasis if bleeding recurs.  2) Discharge on pain meds as per ED Team.  3) Dental F/U with OMFS at [facility] ([phone]) OR outpatient oral surgeon for post-op of #31 and #32 OR [facility] dental clinic ([phone]).  4) Dental F/U with outpatient dentist for comprehensive dental care.    Dental resident: Lolita Sands DDS #[id number] 53y old female who presents to [facility] ED with a chief complaint of excessive bleeding after extractions. Patient accompanied with her mother. Dental consulted to evaluate her bleeding after extractions.    Dental HPI: Patient was admitted to ED around 5:45am with excessive bleeding in her mouth. Patient has a developmental disability and HPI was presented by her mother. Patient had #31 and #32 extractions by an oral surgeon at 10am on 8/15/23 and hemostasis was achieved upon completion of the procedure. After her lunch around 11:30am, bleeding started and did not stop since then. Patient was on coumadin. Bleeding worsened overnight. Upon arrival to the ED, patient vomited which contained a lot of blood.     PAST MEDICAL & SURGICAL HISTORY:  Chronic atrial fibrillation    Hypothyroid    Hypertension    Severe tricuspid regurgitation    Anemia    S/P mitral valve replacement    H/O tricuspid valve repair    (+) heart valve replacement    MEDICATIONS  (STANDING):  amoxicillin  500 milliGRAM(s)/clavulanate 1 Tablet(s) Oral two times a day  chlorhexidine 2% Cloths 1 Application(s) Topical <User Schedule>  metoprolol tartrate 25 milliGRAM(s) Oral every 12 hours  potassium phosphate / sodium phosphate Tablet (K-PHOS No. 2) 1 Tablet(s) Oral three times a day before meals  spironolactone 25 milliGRAM(s) Oral once  torsemide 20 milliGRAM(s) Oral once    MEDICATIONS  (PRN):  acetaminophen     Tablet. 975 milliGRAM(s) Oral every 6 hours PRN Mild Pain (1 - 3)      LABS:  WBC Count: 8.27 K/uL [3.80 - 10.50] (08-16 @ 09:33)  Platelet Count - Automated: 105 K/uL *L* [150 - 400] (08-16 @ 09:33)  WBC Count: 7.42 K/uL [3.80 - 10.50] (08-16 @ 06:18)  Platelet Count - Automated: 157 K/uL [150 - 400] (08-16 @ 06:18)  INR: 3.68 ratio *H* [0.85 - 1.18] (08-16 @ 06:18)  PT/INR - ( 16 Aug 2023 06:18 )   PT: 39.0 sec;   INR: 3.68 ratio  PTT - ( 16 Aug 2023 06:18 )  PTT:43.1 sec      Allergies  Keflex (Unknown)  strawberry (Hives)    *Last Dental Visit: 8/15/23 at [facility]    EOE:             Mandible: FROM            Facial bones and MOM grossly intact            ( - ) trismus            ( - ) lymphadenopathy            ( - ) swelling            ( - ) asymmetry            ( + ) palpation – pain upon palpation on body of right mandible around tooth #31 and 32            ( - ) dyspnea            ( - ) dysphagia            ( - ) loss of consciousness    IOE:  permanent dentition: grossly intact          tongue/FOM: No pathology noted          labial/buccal mucosa: unable to assess due to patient’s inability to open mouth          percussion: unable to assess due to patient’s inability to open mouth          ( + ) palpation: pain upon palpation around extraction sites of #31 and 32          ( - ) swelling           ( - ) abscess          ( - ) sinus tract  Based on the record of the extraction procedure that was provided by patient’s mother, it is recorded as surgical extractions.    *DENTAL RADIOGRAPHS: No rads, patient not transportable as per ED team    *ASSESSMENT: Bleeding due to failure of continued hemostasis after extractions of #31 and 32.      PROCEDURE: Limited bedside exam performed with verbal consent from patient’s mother. Prior to the exam, it was informed that MBP and medication to reverse coumadin have been given to the patient, TXA at ext sites, and Surgicel was placed in the extraction sites by the med team. Mouth prop placed. Firm pressure applied with 4x4 gauze for 30 minutes, without achieving hemostasis. 4x4 gauze soaked with 2 carpules of 2% lidocaine with epinephrine 1:50K and applied to the bleeding sites with firm pressure for 30 minutes and achieved hemostasis. Stayed with patient for another 30 minutes and confirmed that complete hemostasis was achieved. Then, visualized Surgicel packed in the extraction sites. Prepared 4x4 gauze soaked with lidocaine with epinephrine 1:50K and provided it to the med team and instruction was provided to the attending nurse. Extraction post-op instruction was provided to both med team and patient’s mother.    RECOMMENDATIONS:  1) Monitor the status of bleeding in the extraction sites of #31 and 32 and achieve hemostasis if bleeding recurs.  2) Discharge on pain meds as per ED Team.  3) Dental F/U with OMFS at [facility] ([phone]) OR outpatient oral surgeon for post-op of #31 and #32 OR [facility] dental clinic ([phone]).  4) Dental F/U with outpatient dentist for comprehensive dental care.    Dental resident: Lolita Sands DDS #[id number]

## 2023-08-16 NOTE — H&P ADULT - NSHPPHYSICALEXAM_GEN_ALL_CORE
Vital Signs Last 24 Hrs  T(C): 36.2 (16 Aug 2023 15:00), Max: 36.7 (16 Aug 2023 06:00)  T(F): 97.1 (16 Aug 2023 15:00), Max: 98 (16 Aug 2023 06:00)  HR: 91 (16 Aug 2023 16:00) (86 - 110)  BP: 89/50 (16 Aug 2023 16:00) (88/57 - 106/71)  BP(mean): 64 (16 Aug 2023 16:00) (64 - 83)  RR: 15 (16 Aug 2023 16:00) (14 - 28)  SpO2: 98% (16 Aug 2023 16:00) (95% - 100%)    Parameters below as of 16 Aug 2023 08:44  Patient On (Oxygen Delivery Method): room air    Gen: NAD, laying in bed comfortably  HEENT: mouth with dry blood, 4mm pupils equal and reactive, EOMI  RESP: speaking clearly, unlabored respirations  CARDIO: RRR  ABD: soft, nontender, nondistended  PELVIS: L buttock irritation/wound  EXT: SINGLETARY spontaneously, no deformities  NEURO: A&Ox4, no focal deficits

## 2023-08-16 NOTE — CONSULT NOTE ADULT - ASSESSMENT
Impression:    Left Buttocks contusion  Moisture Dermatitis  Pressure Injury Prophylaxis    Recommend:  1.) topical therapy: sacral/buttock injury - cleanse with incontinence cleanser, pat dry, apply won ointment BID and PRN for incontinent episodes  2.) Incontinence Management - incontinence cleanser, pads, pericare BID  3.) Maintain on an alternating air with low air loss surface  4.) Turn and reposition Q 2 hours  5.) Nutrition optimization  6.) Offload heels/feet with complete cair air fluidized boots; ensure that the soles of the feet are not resting on the foot board of the bed.  7.) Chair cushion for chair sitting    Care as per medicine. Will not actively follow but will remain available. Please recall for new issues or deterioration.  Upon discharge f/[facility] outpatient at [facility] [address] [place] [address] [phone]  Thank you for this consult  Discussed with clinical nurse  Mandie Caceres, TIMA-C, CWOCN via Teams

## 2023-08-16 NOTE — CONSULT NOTE ADULT - CONSULT REASON
Evaluate cardiac status.  Patient admitted because of bleeding after dental procedure
oral bleeding
to evaluate bleeding after dental extractions
Left buttock ecchymosis

## 2023-08-17 ENCOUNTER — TRANSCRIPTION ENCOUNTER (OUTPATIENT)
Age: 53
End: 2023-08-17

## 2023-08-17 LAB
ANION GAP SERPL CALC-SCNC: 13 MMOL/L — SIGNIFICANT CHANGE UP (ref 5–17)
ANION GAP SERPL CALC-SCNC: 14 MMOL/L — SIGNIFICANT CHANGE UP (ref 5–17)
APTT BLD: 29.7 SEC — SIGNIFICANT CHANGE UP (ref 24.5–35.6)
APTT BLD: 35.6 SEC — SIGNIFICANT CHANGE UP (ref 24.5–35.6)
BUN SERPL-MCNC: 29 MG/DL — HIGH (ref 7–23)
BUN SERPL-MCNC: 44 MG/DL — HIGH (ref 7–23)
CALCIUM SERPL-MCNC: 8.3 MG/DL — LOW (ref 8.4–10.5)
CALCIUM SERPL-MCNC: 8.3 MG/DL — LOW (ref 8.4–10.5)
CHLORIDE SERPL-SCNC: 99 MMOL/L — SIGNIFICANT CHANGE UP (ref 96–108)
CHLORIDE SERPL-SCNC: 99 MMOL/L — SIGNIFICANT CHANGE UP (ref 96–108)
CO2 SERPL-SCNC: 26 MMOL/L — SIGNIFICANT CHANGE UP (ref 22–31)
CO2 SERPL-SCNC: 27 MMOL/L — SIGNIFICANT CHANGE UP (ref 22–31)
CREAT SERPL-MCNC: 0.67 MG/DL — SIGNIFICANT CHANGE UP (ref 0.5–1.3)
CREAT SERPL-MCNC: 0.74 MG/DL — SIGNIFICANT CHANGE UP (ref 0.5–1.3)
EGFR: 104 ML/MIN/1.73M2 — SIGNIFICANT CHANGE UP
EGFR: 96 ML/MIN/1.73M2 — SIGNIFICANT CHANGE UP
GLUCOSE SERPL-MCNC: 100 MG/DL — HIGH (ref 70–99)
GLUCOSE SERPL-MCNC: 200 MG/DL — HIGH (ref 70–99)
HCT VFR BLD CALC: 26.6 % — LOW (ref 34.5–45)
HCT VFR BLD CALC: 27.1 % — LOW (ref 34.5–45)
HGB BLD-MCNC: 9 G/DL — LOW (ref 11.5–15.5)
HGB BLD-MCNC: 9.1 G/DL — LOW (ref 11.5–15.5)
INR BLD: 1.21 RATIO — HIGH (ref 0.85–1.18)
INR BLD: 1.78 RATIO — HIGH (ref 0.85–1.18)
MAGNESIUM SERPL-MCNC: 2.3 MG/DL — SIGNIFICANT CHANGE UP (ref 1.6–2.6)
MAGNESIUM SERPL-MCNC: 2.4 MG/DL — SIGNIFICANT CHANGE UP (ref 1.6–2.6)
MCHC RBC-ENTMCNC: 33.6 GM/DL — SIGNIFICANT CHANGE UP (ref 32–36)
MCHC RBC-ENTMCNC: 33.6 PG — SIGNIFICANT CHANGE UP (ref 27–34)
MCHC RBC-ENTMCNC: 33.8 GM/DL — SIGNIFICANT CHANGE UP (ref 32–36)
MCHC RBC-ENTMCNC: 34 PG — SIGNIFICANT CHANGE UP (ref 27–34)
MCV RBC AUTO: 101.1 FL — HIGH (ref 80–100)
MCV RBC AUTO: 99.3 FL — SIGNIFICANT CHANGE UP (ref 80–100)
NRBC # BLD: 0 /100 WBCS — SIGNIFICANT CHANGE UP (ref 0–0)
NRBC # BLD: 0 /100 WBCS — SIGNIFICANT CHANGE UP (ref 0–0)
PHOSPHATE SERPL-MCNC: 2.4 MG/DL — LOW (ref 2.5–4.5)
PHOSPHATE SERPL-MCNC: 3.7 MG/DL — SIGNIFICANT CHANGE UP (ref 2.5–4.5)
PLATELET # BLD AUTO: 107 K/UL — LOW (ref 150–400)
PLATELET # BLD AUTO: 98 K/UL — LOW (ref 150–400)
POTASSIUM SERPL-MCNC: 3.1 MMOL/L — LOW (ref 3.5–5.3)
POTASSIUM SERPL-MCNC: 3.7 MMOL/L — SIGNIFICANT CHANGE UP (ref 3.5–5.3)
POTASSIUM SERPL-SCNC: 3.1 MMOL/L — LOW (ref 3.5–5.3)
POTASSIUM SERPL-SCNC: 3.7 MMOL/L — SIGNIFICANT CHANGE UP (ref 3.5–5.3)
PROTHROM AB SERPL-ACNC: 13.2 SEC — HIGH (ref 9.5–13)
PROTHROM AB SERPL-ACNC: 18.4 SEC — HIGH (ref 9.5–13)
RBC # BLD: 2.68 M/UL — LOW (ref 3.8–5.2)
RBC # BLD: 2.68 M/UL — LOW (ref 3.8–5.2)
RBC # FLD: 16 % — HIGH (ref 10.3–14.5)
RBC # FLD: 16.4 % — HIGH (ref 10.3–14.5)
SODIUM SERPL-SCNC: 139 MMOL/L — SIGNIFICANT CHANGE UP (ref 135–145)
SODIUM SERPL-SCNC: 139 MMOL/L — SIGNIFICANT CHANGE UP (ref 135–145)
WBC # BLD: 5.54 K/UL — SIGNIFICANT CHANGE UP (ref 3.8–10.5)
WBC # BLD: 6.43 K/UL — SIGNIFICANT CHANGE UP (ref 3.8–10.5)
WBC # FLD AUTO: 5.54 K/UL — SIGNIFICANT CHANGE UP (ref 3.8–10.5)
WBC # FLD AUTO: 6.43 K/UL — SIGNIFICANT CHANGE UP (ref 3.8–10.5)

## 2023-08-17 PROCEDURE — 99232 SBSQ HOSP IP/OBS MODERATE 35: CPT

## 2023-08-17 PROCEDURE — 99232 SBSQ HOSP IP/OBS MODERATE 35: CPT | Mod: GC

## 2023-08-17 RX ORDER — POTASSIUM CHLORIDE 20 MEQ
40 PACKET (EA) ORAL ONCE
Refills: 0 | Status: COMPLETED | OUTPATIENT
Start: 2023-08-17 | End: 2023-08-17

## 2023-08-17 RX ORDER — POTASSIUM CHLORIDE 20 MEQ
40 PACKET (EA) ORAL ONCE
Refills: 0 | Status: DISCONTINUED | OUTPATIENT
Start: 2023-08-17 | End: 2023-08-17

## 2023-08-17 RX ORDER — POTASSIUM PHOSPHATE, MONOBASIC POTASSIUM PHOSPHATE, DIBASIC 236; 224 MG/ML; MG/ML
30 INJECTION, SOLUTION INTRAVENOUS ONCE
Refills: 0 | Status: COMPLETED | OUTPATIENT
Start: 2023-08-17 | End: 2023-08-17

## 2023-08-17 RX ORDER — SPIRONOLACTONE 25 MG/1
25 TABLET, FILM COATED ORAL
Refills: 0 | Status: DISCONTINUED | OUTPATIENT
Start: 2023-08-17 | End: 2023-08-18

## 2023-08-17 RX ORDER — LEVOTHYROXINE SODIUM 125 MCG
1 TABLET ORAL
Refills: 0 | DISCHARGE

## 2023-08-17 RX ORDER — METOPROLOL TARTRATE 50 MG
25 TABLET ORAL ONCE
Refills: 0 | Status: COMPLETED | OUTPATIENT
Start: 2023-08-17 | End: 2023-08-17

## 2023-08-17 RX ORDER — METOPROLOL TARTRATE 50 MG
25 TABLET ORAL EVERY 12 HOURS
Refills: 0 | Status: DISCONTINUED | OUTPATIENT
Start: 2023-08-17 | End: 2023-08-18

## 2023-08-17 RX ADMIN — Medication 40 MILLIEQUIVALENT(S): at 01:16

## 2023-08-17 RX ADMIN — Medication 40 MILLIEQUIVALENT(S): at 12:06

## 2023-08-17 RX ADMIN — Medication 20 MILLIGRAM(S): at 10:08

## 2023-08-17 RX ADMIN — Medication 975 MILLIGRAM(S): at 11:33

## 2023-08-17 RX ADMIN — Medication 25 MILLIGRAM(S): at 06:27

## 2023-08-17 RX ADMIN — POTASSIUM PHOSPHATE, MONOBASIC POTASSIUM PHOSPHATE, DIBASIC 83.33 MILLIMOLE(S): 236; 224 INJECTION, SOLUTION INTRAVENOUS at 23:15

## 2023-08-17 RX ADMIN — Medication 25 GRAM(S): at 00:13

## 2023-08-17 RX ADMIN — CHLORHEXIDINE GLUCONATE 1 APPLICATION(S): 213 SOLUTION TOPICAL at 05:51

## 2023-08-17 RX ADMIN — Medication 1 TABLET(S): at 05:47

## 2023-08-17 RX ADMIN — Medication 975 MILLIGRAM(S): at 10:33

## 2023-08-17 RX ADMIN — SPIRONOLACTONE 25 MILLIGRAM(S): 25 TABLET, FILM COATED ORAL at 10:08

## 2023-08-17 RX ADMIN — Medication 1 TABLET(S): at 17:08

## 2023-08-17 NOTE — DISCHARGE NOTE PROVIDER - CARE PROVIDER_API CALL
Alden Gale  Cardiovascular Disease  [address], [address]  [place] [address]  Phone: [phone]  Fax: [phone]  Follow Up Time:

## 2023-08-17 NOTE — DISCHARGE NOTE PROVIDER - NSDCFUADDAPPT_GEN_ALL_CORE_FT
Please follow up with your oral surgeon that did the tooth extraction within 1-2 weeks.    Please follow up with the [facility]ound Center at [address] [place] [address] regarding her sacral/buttock wound

## 2023-08-17 NOTE — DISCHARGE NOTE PROVIDER - NSDCFUSCHEDAPPT_GEN_ALL_CORE_FT
Alden Gale Physician [facility]  CARDIOLOGY [address] [facility] [facility]   Scheduled Appointment: 09/05/2023

## 2023-08-17 NOTE — DISCHARGE NOTE PROVIDER - NSDCCPCAREPLAN_GEN_ALL_CORE_FT
PRINCIPAL DISCHARGE DIAGNOSIS  Diagnosis: Infected dental caries  Assessment and Plan of Treatment: You bled from your tooth extraction sites. This was controlled w/ direct pressure over the site w/ gauze.  - Keep your fingers and tongue away from the socket or surgical area.  Use ice packs on the surgical area (side of face) for the first 48 hours; apply ice 20 minutes on and 10 minutes off.  - For any discomfort, take Tylenol or ibuprofen every 4-6 hours.  - Drink plenty of fluids. (Do not use a straw—this creates suction in the mouth that could cause complications.)  - Avoid strenuous activity and do not exercise for at least 3–4 days after surgery. After that, be careful: your regular caloric and fluid intake have been reduced, so you may get light-headed, dizzy, or weak.  - If the muscles of the jaw become stiff, the use of warm moist heat to the outside of your face over the spots that are stiff will relax these muscles.  - After the first post-operative day, use a warm salt water rinse following meals for the first week to flush out particles of food and debris that may lodge in the surgical area. (Mix ½ teaspoon of salt in a glass of warm water. Mouthwash can be added for better taste.)  - Your diet should consist mainly of soft, easily swallowed foods and cool drinks. Avoid anything that might get stuck in your teeth, so no seeds, nuts, rice, popcorn, or similar foods.     PRINCIPAL DISCHARGE DIAGNOSIS  Diagnosis: Infected dental caries  Assessment and Plan of Treatment: You bled from your tooth extraction sites. This was controlled w/ direct pressure over the site w/ gauze.  - Keep your fingers and tongue away from the socket or surgical area.  Use ice packs on the surgical area (side of face) for the first 48 hours; apply ice 20 minutes on and 10 minutes off.  - For any discomfort, take Tylenol or ibuprofen every 4-6 hours.  - Drink plenty of fluids. (Do not use a straw—this creates suction in the mouth that could cause complications.)  - Avoid strenuous activity and do not exercise for at least 3–4 days after surgery. After that, be careful: your regular caloric and fluid intake have been reduced, so you may get light-headed, dizzy, or weak.  - If the muscles of the jaw become stiff, the use of warm moist heat to the outside of your face over the spots that are stiff will relax these muscles.  - After the first post-operative day, use a warm salt water rinse following meals for the first week to flush out particles of food and debris that may lodge in the surgical area. (Mix ½ teaspoon of salt in a glass of warm water. Mouthwash can be added for better taste.)  - Your diet should consist mainly of soft, easily swallowed foods and cool drinks. Avoid anything that might get stuck in your teeth, so no seeds, nuts, rice, popcorn, or similar foods.  - Please take the antibiotics as directed (one pill twice a day), please follow up with your dentist or PCP, to advise you on how long to continue the antibiotics

## 2023-08-17 NOTE — DISCHARGE NOTE PROVIDER - NSDCMRMEDTOKEN_GEN_ALL_CORE_FT
metOLazone 2.5 mg oral tablet: 1 tab(s) orally 3 times a week as needed for  significant weight gain  metoprolol succinate 50 mg oral tablet, extended release: 1 tab(s) orally once a day  spironolactone 25 mg oral tablet: 1 tab(s) orally once a day  Tirosint 175 mcg (0.175 mg) oral capsule: 1 cap(s) orally once a day - must be this particular brand  torsemide 20 mg oral tablet: 1 tab(s) orally once a day  warfarin 7.5 mg oral tablet: 1 tab(s) orally once a day   Augmentin 500 mg-125 mg oral tablet: 1 tab(s) orally 2 times a day MDD: 2  metOLazone 2.5 mg oral tablet: 1 tab(s) orally 3 times a week as needed for  significant weight gain  metoprolol succinate 50 mg oral tablet, extended release: 1 tab(s) orally once a day  spironolactone 25 mg oral tablet: 1 tab(s) orally once a day  Tirosint 175 mcg (0.175 mg) oral capsule: 1 cap(s) orally once a day - must be this particular brand  torsemide 20 mg oral tablet: 1 tab(s) orally once a day  warfarin 7.5 mg oral tablet: 1 tab(s) orally once a day

## 2023-08-17 NOTE — DISCHARGE NOTE PROVIDER - HOSPITAL COURSE
54 y/o female w/ a PMHx of congenital heart disease s/p repair (as a child), atrial fibrillation on warfarin, biventricular HF, s/p MV & TV replacement (2015), s/p TV replacement (May 2023), developmental delay, HTN, anemia, Graves disease, hypothyroidism, and anemia who presented on 8/16 for persistent oral bleeding. Patient had tooth pain secondary to infected molars requiring extraction of tooth #31 and #32 on 8/15. Bleeding was difficult to control as patient was also vomiting. Patient became hypotensive during her course in the ED and was transfused 2 units of blood & brought up to SICU for hemodynamic monitoring. Hemostasis was achieved w/ direct pressure.     Patient was evaluated by the multi-disciplinary team including surgeon, nephrologist, ACP, pharmacist, nutrition, social work, physical therapy, and nursing and deemed stable for discharge. At the time of discharge, patient was hemodynamically stable, tolerating an oral diet, voiding urine, passing stool, and ambulating. The patient and family felt comfortable w/ discharge. The patient was discharged home w/ . 52 y/o female w/ a PMHx of congenital heart disease s/p repair (as a child), atrial fibrillation on warfarin, biventricular HF, s/p MV & TV replacement (2015), s/p TV replacement (May 2023), developmental delay, HTN, anemia, Graves disease, hypothyroidism, and anemia who presented on 8/16 for persistent oral bleeding. Patient had tooth pain secondary to infected molars requiring extraction of tooth #31 and #32 on 8/15. Bleeding was difficult to control as patient was also vomiting. Patient became hypotensive during her course in the ED and was transfused 2 units of blood & brought up to SICU for hemodynamic monitoring. Hemostasis was achieved w/ direct pressure. Her home metoprolol, torsemide, and spironolactone was slowly restarted. Patient was evaluated by the multi-disciplinary team including attending, ACP, social work, and nursing and deemed stable for discharge. At the time of discharge, patient was hemodynamically stable, tolerating an oral diet, voiding urine, passing stool, and ambulating. The patient and family felt comfortable w/ discharge.

## 2023-08-17 NOTE — PROGRESS NOTE ADULT - ATTENDING COMMENTS
ON: non sustained vtach    pain well controlled  has MR  aaox3  on RA  no oral bleeding  low baseline BPs  reg diet  had BM melena likely from epistaxis  voiding freely  hypokalemia, repleted  of chem VTE for now  restart coumadin tomorrow night  restarted home meds  good glycemic control  afebrile, augmentin per OMFS  ambulated in SICU    can be discharged home. discussed w cardio

## 2023-08-17 NOTE — DISCHARGE NOTE PROVIDER - REASON FOR ADMISSION
CYSTOSCOPY PROCEDURE NOTE    Reason for cystoscopy: Right ureteral stone    Brief History: 56 yo M with right ureteral stent after URS/LL.  HAs solitary right kidney and some concern for UPJ obstruction.    CYSTOSCOPY  After obtaining informed consent, the patient was prepped and draped in the standard sterile fashion.  The 15 Malian flexible cystoscope was inserted through the urethral meatus.  The right ureteral stent was grasped and removed intact.    Assessment/Plan:  -CT and renal scan in 4 weeks    IRj saw and evaluated this patient and agree with the plan as stated above.  I personally performed all listed procedures.    
Hemorrhage

## 2023-08-18 ENCOUNTER — TRANSCRIPTION ENCOUNTER (OUTPATIENT)
Age: 53
End: 2023-08-18

## 2023-08-18 VITALS
RESPIRATION RATE: 20 BRPM | DIASTOLIC BLOOD PRESSURE: 62 MMHG | OXYGEN SATURATION: 100 % | TEMPERATURE: 98 F | HEART RATE: 93 BPM | SYSTOLIC BLOOD PRESSURE: 105 MMHG

## 2023-08-18 PROCEDURE — 36430 TRANSFUSION BLD/BLD COMPNT: CPT

## 2023-08-18 PROCEDURE — 80048 BASIC METABOLIC PNL TOTAL CA: CPT

## 2023-08-18 PROCEDURE — 96375 TX/PRO/DX INJ NEW DRUG ADDON: CPT

## 2023-08-18 PROCEDURE — 96374 THER/PROPH/DIAG INJ IV PUSH: CPT

## 2023-08-18 PROCEDURE — 84100 ASSAY OF PHOSPHORUS: CPT

## 2023-08-18 PROCEDURE — 36415 COLL VENOUS BLD VENIPUNCTURE: CPT

## 2023-08-18 PROCEDURE — 86923 COMPATIBILITY TEST ELECTRIC: CPT

## 2023-08-18 PROCEDURE — 85610 PROTHROMBIN TIME: CPT

## 2023-08-18 PROCEDURE — 86850 RBC ANTIBODY SCREEN: CPT

## 2023-08-18 PROCEDURE — 93005 ELECTROCARDIOGRAM TRACING: CPT

## 2023-08-18 PROCEDURE — P9016: CPT

## 2023-08-18 PROCEDURE — 99232 SBSQ HOSP IP/OBS MODERATE 35: CPT | Mod: GC

## 2023-08-18 PROCEDURE — 83735 ASSAY OF MAGNESIUM: CPT

## 2023-08-18 PROCEDURE — 99285 EMERGENCY DEPT VISIT HI MDM: CPT

## 2023-08-18 PROCEDURE — P9100: CPT

## 2023-08-18 PROCEDURE — 85027 COMPLETE CBC AUTOMATED: CPT

## 2023-08-18 PROCEDURE — 85025 COMPLETE CBC W/AUTO DIFF WBC: CPT

## 2023-08-18 PROCEDURE — 86900 BLOOD TYPING SEROLOGIC ABO: CPT

## 2023-08-18 PROCEDURE — 85730 THROMBOPLASTIN TIME PARTIAL: CPT

## 2023-08-18 PROCEDURE — P9037: CPT

## 2023-08-18 PROCEDURE — 80053 COMPREHEN METABOLIC PANEL: CPT

## 2023-08-18 PROCEDURE — 86901 BLOOD TYPING SEROLOGIC RH(D): CPT

## 2023-08-18 RX ORDER — OXYCODONE HYDROCHLORIDE 5 MG/1
2.5 TABLET ORAL ONCE
Refills: 0 | Status: DISCONTINUED | OUTPATIENT
Start: 2023-08-18 | End: 2023-08-18

## 2023-08-18 RX ORDER — METHOCARBAMOL 500 MG/1
500 TABLET, FILM COATED ORAL ONCE
Refills: 0 | Status: COMPLETED | OUTPATIENT
Start: 2023-08-18 | End: 2023-08-18

## 2023-08-18 RX ADMIN — Medication 20 MILLIGRAM(S): at 09:19

## 2023-08-18 RX ADMIN — Medication 1 TABLET(S): at 05:15

## 2023-08-18 RX ADMIN — OXYCODONE HYDROCHLORIDE 2.5 MILLIGRAM(S): 5 TABLET ORAL at 02:35

## 2023-08-18 RX ADMIN — Medication 25 MILLIGRAM(S): at 05:15

## 2023-08-18 RX ADMIN — SPIRONOLACTONE 25 MILLIGRAM(S): 25 TABLET, FILM COATED ORAL at 09:18

## 2023-08-18 RX ADMIN — Medication 975 MILLIGRAM(S): at 00:20

## 2023-08-18 RX ADMIN — OXYCODONE HYDROCHLORIDE 2.5 MILLIGRAM(S): 5 TABLET ORAL at 03:05

## 2023-08-18 RX ADMIN — CHLORHEXIDINE GLUCONATE 1 APPLICATION(S): 213 SOLUTION TOPICAL at 05:15

## 2023-08-18 RX ADMIN — METHOCARBAMOL 500 MILLIGRAM(S): 500 TABLET, FILM COATED ORAL at 01:13

## 2023-08-18 RX ADMIN — Medication 975 MILLIGRAM(S): at 00:50

## 2023-08-18 NOTE — DISCHARGE NOTE NURSING/CASE MANAGEMENT/SOCIAL WORK - PATIENT PORTAL LINK FT
You can access the FollowMyHealth Patient Portal offered by [facility] by registering at the following website: [email or web address] By joining [facility]’s FollowMyHealth portal, you will also be able to view your health information using other applications (apps) compatible with our system.

## 2023-08-18 NOTE — DIETITIAN INITIAL EVALUATION ADULT - PERTINENT MEDS FT
MEDICATIONS  (STANDING):  amoxicillin  500 milliGRAM(s)/clavulanate 1 Tablet(s) Oral two times a day  chlorhexidine 2% Cloths 1 Application(s) Topical <User Schedule>  metoprolol tartrate 25 milliGRAM(s) Oral every 12 hours  spironolactone 25 milliGRAM(s) Oral <User Schedule>  Tirosint 175 mcg tablet 1 Tablet(s) 1 Tablet(s) Oral daily  torsemide 20 milliGRAM(s) Oral <User Schedule>    MEDICATIONS  (PRN):  acetaminophen     Tablet .. 975 milliGRAM(s) Oral every 6 hours PRN Mild Pain (1 - 3)

## 2023-08-18 NOTE — DIETITIAN INITIAL EVALUATION ADULT - OTHER INFO
Nutrition Status:  - Oral bleeding; home Coumadin Rx noted.   - Per Cardiology: "evidence of some right heart failure with edema weight gain requiring diuretics increased dose"  --> Aldactone and Torsemide ordered  - Pre-diabetes: HbA1c 6.1% (5-16-23)  - Wound Care consulted for Suspected DTI; deemed "Left buttock contusion" by Wound Care

## 2023-08-18 NOTE — PROGRESS NOTE ADULT - SUBJECTIVE AND OBJECTIVE BOX
SICU Daily Progress Note  =====================================================  Interval/Overnight Events:       - Advanced to regular diet  - removed mouth gauze and surgical w/ no bleeding noted afterwards    Allergies: Keflex (Unknown)  strawberry (Hives)      MEDICATIONS:   --------------------------------------------------------------------------------------  Neurologic Medications  acetaminophen     Tablet .. 975 milliGRAM(s) Oral every 6 hours PRN Mild Pain (1 - 3)    Respiratory Medications    Cardiovascular Medications  metoprolol tartrate 25 milliGRAM(s) Oral every 12 hours  spironolactone 25 milliGRAM(s) Oral <User Schedule>  torsemide 20 milliGRAM(s) Oral <User Schedule>    Gastrointestinal Medications    Genitourinary Medications    Hematologic/Oncologic Medications    Antimicrobial/Immunologic Medications  amoxicillin  500 milliGRAM(s)/clavulanate 1 Tablet(s) Oral two times a day    Endocrine/Metabolic Medications    Topical/Other Medications  chlorhexidine 2% Cloths 1 Application(s) Topical <User Schedule>  Tirosint 175 mcg tablet 1 Tablet(s) 1 Tablet(s) Oral daily    --------------------------------------------------------------------------------------    VITAL SIGNS, INS/OUTS (last 24 hours):  --------------------------------------------------------------------------------------  ((Insert SICU Vitals/Is+Os here))***  --------------------------------------------------------------------------------------    EXAM  NEUROLOGY  Exam: Normal, NAD, alert, oriented x3, no focal deficits.    HEENT  Exam: Normocephalic, atraumatic, EOMI.     RESPIRATORY  Exam: Lungs clear to auscultation, Normal expansion/effort.   Mechanical Ventilation:     CARDIOVASCULAR  Exam: S1, S2.  Regular rate and rhythm.     GI/NUTRITION  Exam: Abdomen soft, Non-tender, Non-distended.     VASCULAR  Exam: Extremities warm, pink, well-perfused.    MUSCULOSKELETAL  Exam: All extremities moving spontaneously without limitations.    SKIN  Exam: Good skin turgor, no skin breakdown.       Tubes/Lines/Drains  ***  [x] Peripheral IV  [] Central Venous Line     	[] R	[] L	[] IJ	[] Fem	[] SC	  [] Arterial Line		[] R	[] L	[] Fem	[] Rad	[] Ax	  [] PICC		[] Midline		[] Mediport  [] Urinary Catheter		  [x] Necessity of urinary, arterial, and venous catheters discussed    LABS  --------------------------------------------------------------------------------------  ((Insert SICU Labs here))***  --------------------------------------------------------------------------------------    IMAGING STUDIES:       
Subjective: Patient seen and examined. No new events except as noted.   Very anxious  no further bleeding from mouth anemic hypokalemic  feels weak and anxious  MEDICATIONS:  MEDICATIONS  (STANDING):  amoxicillin  500 milliGRAM(s)/clavulanate 1 Tablet(s) Oral two times a day  chlorhexidine 2% Cloths 1 Application(s) Topical <User Schedule>  metoprolol tartrate 25 milliGRAM(s) Oral every 12 hours  spironolactone 25 milliGRAM(s) Oral <User Schedule>  Tirosint 175 mcg tablet 1 Tablet(s) 1 Tablet(s) Oral daily  torsemide 20 milliGRAM(s) Oral <User Schedule>      PHYSICAL EXAM:  T(C): 36.1 (08-17-23 @ 11:00), Max: 37.2 (08-16-23 @ 19:00)  HR: 85 (08-17-23 @ 12:00) (79 - 104)  BP: 87/44 (08-17-23 @ 12:00) (80/41 - 111/54)  RR: 15 (08-17-23 @ 12:00) (9 - 32)  SpO2: 100% (08-17-23 @ 12:00) (96% - 100%)  Wt(kg): --  I&O's Summary    16 Aug 2023 07:01  -  17 Aug 2023 07:00  --------------------------------------------------------  IN: 940 mL / OUT: 2900 mL / NET: -1960 mL    17 Aug 2023 07:01  -  17 Aug 2023 13:42  --------------------------------------------------------  IN: 240 mL / OUT: 200 mL / NET: 40 mL          Appearance: Normal awake alert no respiratory distress	  HEENT:   Normal oral mucosa, PERRL, EOMI	  Cardiovascular: Normal S1 S2, irregularly irregular 2/6 murmur at apex moderate JVD  Respiratory: Lungs clear to auscultation, normal effort 	  Gastrointestinal:  Soft, Non-tender, + BS	  Skin: No rashes, No ecchymoses, No cyanosis, warm to touch  Musculoskeletal: Normal range of motion, normal strength  Psychiatry:  Mood & affect appropriate  Ext: 1+ edema  Peripheral pulses palpable 2+ bilaterally      LABS:    CARDIAC MARKERS:                                9.0    6.43  )-----------( 98       ( 17 Aug 2023 00:15 )             26.6     08-17    139  |  99  |  44<H>  ----------------------------<  100<H>  3.1<L>   |  27  |  0.67    Ca    8.3<L>      17 Aug 2023 00:30  Phos  3.7     08-17  Mg     2.3     08-17    TPro  6.7  /  Alb  4.3  /  TBili  2.7<H>  /  DBili  x   /  AST  44<H>  /  ALT  13  /  AlkPhos  61  08-16    proBNP:   Lipid Profile:   HgA1c:   TSH:           TELEMETRY: 	    ECG:  afib RBBB	            
Surgery Progress Note    Subjectve: Pt seen and examined. Reports feeling better. No more bleeding. No nausea, vomiting, or abdominal pain. Tolerating clears and would like to eat regular food.     Vital Signs Last 24 Hrs  T(C): 36.1 (17 Aug 2023 07:00), Max: 37.2 (16 Aug 2023 19:00)  T(F): 97 (17 Aug 2023 07:00), Max: 98.9 (16 Aug 2023 19:00)  HR: 89 (17 Aug 2023 07:00) (87 - 104)  BP: 111/49 (17 Aug 2023 07:00) (80/41 - 111/54)  BP(mean): 71 (17 Aug 2023 07:00) (55 - 83)  RR: 32 (17 Aug 2023 07:00) (9 - 32)  SpO2: 100% (17 Aug 2023 07:00) (95% - 100%)    Parameters below as of 17 Aug 2023 07:00  Patient On (Oxygen Delivery Method): room air    General Appearance: Appears well, NAD  Neck: Supple  Chest: Equal expansion bilaterally, equal breath sounds  CV: Pulse regular presently    I&O's Summary    16 Aug 2023 07:01  -  17 Aug 2023 07:00  --------------------------------------------------------  IN: 940 mL / OUT: 2900 mL / NET: -1960 mL    17 Aug 2023 07:01  -  17 Aug 2023 08:39  --------------------------------------------------------  IN: 240 mL / OUT: 0 mL / NET: 240 mL      I&O's Detail    16 Aug 2023 07:01  -  17 Aug 2023 07:00  --------------------------------------------------------  IN:    IV PiggyBack: 100 mL    Oral Fluid: 840 mL  Total IN: 940 mL    OUT:    Voided (mL): 2900 mL  Total OUT: 2900 mL    Total NET: -1960 mL      17 Aug 2023 07:01  -  17 Aug 2023 08:39  --------------------------------------------------------  IN:    Oral Fluid: 240 mL  Total IN: 240 mL    OUT:  Total OUT: 0 mL    Total NET: 240 mL      MEDICATIONS  (STANDING):  amoxicillin  500 milliGRAM(s)/clavulanate 1 Tablet(s) Oral two times a day  chlorhexidine 2% Cloths 1 Application(s) Topical <User Schedule>  metoprolol tartrate 25 milliGRAM(s) Oral every 12 hours  spironolactone 25 milliGRAM(s) Oral <User Schedule>  Tirosint 175 mcg tablet 1 Tablet(s) 1 Tablet(s) Oral daily  torsemide 20 milliGRAM(s) Oral <User Schedule>    MEDICATIONS  (PRN):  acetaminophen     Tablet .. 975 milliGRAM(s) Oral every 6 hours PRN Mild Pain (1 - 3)      LABS:                        9.0    6.43  )-----------( 98       ( 17 Aug 2023 00:15 )             26.6     08-17    139  |  99  |  44<H>  ----------------------------<  100<H>  3.1<L>   |  27  |  0.67    Ca    8.3<L>      17 Aug 2023 00:30  Phos  3.7     08-17  Mg     2.3     08-17    TPro  6.7  /  Alb  4.3  /  TBili  2.7<H>  /  DBili  x   /  AST  44<H>  /  ALT  13  /  AlkPhos  61  08-16    PT/INR - ( 17 Aug 2023 00:15 )   PT: 18.4 sec;   INR: 1.78 ratio         PTT - ( 17 Aug 2023 00:15 )  PTT:29.7 sec  Urinalysis Basic - ( 17 Aug 2023 00:30 )    Color: x / Appearance: x / SG: x / pH: x  Gluc: 100 mg/dL / Ketone: x  / Bili: x / Urobili: x   Blood: x / Protein: x / Nitrite: x   Leuk Esterase: x / RBC: x / WBC x   Sq Epi: x / Non Sq Epi: x / Bacteria: x        RADIOLOGY & ADDITIONAL STUDIES:
24 HOUR EVENTS:  - 13 beats v tach, 2g Mg given   - No changes on EKG. In afib, rate controlled     SUBJECTIVE/ROS:  Patient seen at bedside this AM.       OBJECTIVE:    VITALS:  Vital Signs Last 24 Hrs  T(C): 36.5 (16 Aug 2023 22:00), Max: 37.2 (16 Aug 2023 19:00)  T(F): 97.7 (16 Aug 2023 22:00), Max: 98.9 (16 Aug 2023 19:00)  HR: 88 (16 Aug 2023 22:00) (86 - 110)  BP: 85/50 (16 Aug 2023 22:00) (85/50 - 106/71)  BP(mean): 65 (16 Aug 2023 22:00) (62 - 83)  RR: 15 (16 Aug 2023 22:00) (14 - 28)  SpO2: 99% (16 Aug 2023 22:00) (95% - 100%)    Parameters below as of 16 Aug 2023 19:00  Patient On (Oxygen Delivery Method): room air        I&O's Summary    16 Aug 2023 07:01  -  17 Aug 2023 00:50  --------------------------------------------------------  IN: 770 mL / OUT: 650 mL / NET: 120 mL        PHYSICAL EXAM:    NEURO  Exam: AOx3     RESPIRATORY  Exam: on RA     CARDIOVASCULAR  Exam: in afib     GI/NUTRITION  Exam:  abdomen soft, nontender, nondistended        ACCESS DEVICES:  [x] Peripheral IV  [ ] Central Venous Line	[ ] R	[ ] L	[ ] IJ	[ ] Fem	[ ] SC	Placed:   [ ] Arterial Line		[ ] R	[ ] L	[ ] Fem	[ ] Rad	[ ] Ax	Placed:   [ ] PICC:					[ ] Mediport  [ ] Urinary Catheter, Date Placed:   [x] Necessity of urinary, arterial, and venous catheters discussed      LABS:                        9.0    6.43  )-----------( 98       ( 17 Aug 2023 00:15 )             26.6   08-16    136  |  96  |  48<H>  ----------------------------<  129<H>  3.9   |  27  |  0.62    Ca    8.1<L>      16 Aug 2023 09:33  Phos  2.8     08-16  Mg     1.9     08-16    TPro  6.7  /  Alb  4.3  /  TBili  2.7<H>  /  DBili  x   /  AST  44<H>  /  ALT  13  /  AlkPhos  61  08-16    CAPILLARY BLOOD GLUCOSE        MEDICATIONS:   MEDICATIONS  (STANDING):  amoxicillin  500 milliGRAM(s)/clavulanate 1 Tablet(s) Oral two times a day  chlorhexidine 2% Cloths 1 Application(s) Topical <User Schedule>  metoprolol tartrate 25 milliGRAM(s) Oral every 12 hours  spironolactone 25 milliGRAM(s) Oral daily  Tirosint 175 mcg tablet 1 Tablet(s) 1 Tablet(s) Oral daily  torsemide 20 milliGRAM(s) Oral daily    MEDICATIONS  (PRN):  acetaminophen     Tablet .. 975 milliGRAM(s) Oral every 6 hours PRN Mild Pain (1 - 3)

## 2023-08-18 NOTE — DIETITIAN INITIAL EVALUATION ADULT - PERTINENT LABORATORY DATA
08-17    139  |  99  |  29<H>  ----------------------------<  200<H>  3.7   |  26  |  0.74    Ca    8.3<L>      17 Aug 2023 22:16  Phos  2.4     08-17  Mg     2.4     08-17    A1C with Estimated Average Glucose Result: 6.1 % (05-16-23 @ 17:09)

## 2023-08-18 NOTE — DIETITIAN INITIAL EVALUATION ADULT - REASON FOR ADMISSION
Other shock    Per chart: "54 yo F w/ PMHx of AFib on Coumadin (last dose 8/14/23 7.5mg), Biventricular dysfunction w/ MV & TV replacement (2015 @ [place]), developmental delay, HTN, Anemia, Graves DX, Hypothyroid, Anemia, presents s/p tooth extraction w/ bleeding out her mouth."

## 2023-08-18 NOTE — DIETITIAN INITIAL EVALUATION ADULT - NS FNS WEIGHT CHANGE REASON
Noted h/o weight fluctuations per HIE (in setting of heart failure):  71.2kg (10/26/20), 70.8kg (9/9/21), 67.6kg (10/13/22), 69.9kg (5/30/23), 64kg (7/4/23), 65.8kg (7/25/23)/unintentional

## 2023-08-18 NOTE — DIETITIAN INITIAL EVALUATION ADULT - NAME AND PHONE
Buffy Martin MS RD CDN [facility];   Available on TEAMS (preferred);   Pager #[phone]

## 2023-08-18 NOTE — DIETITIAN INITIAL EVALUATION ADULT - ORAL INTAKE PTA/DIET HISTORY
[pt interview pending]  Allergies: strawberry No nutrition concerns reported.  Allergies: strawberry

## 2023-08-18 NOTE — PROGRESS NOTE ADULT - ATTENDING COMMENTS
ON: no major events    pain well controlled  has MR  aaox3  on RA  no oral bleeding  low baseline BPs, improved  reg diet  had BM melena likely from epistaxis, resolving  voiding freely  of chem VTE for now  restart coumadin tonight  restarted home meds  good glycemic control  afebrile, augmentin per OMFS, attempted to get an end date but OMFS surgeon can not be reached  ambulates. complains of leg cramps. is chronic    can be discharged home. discussed w her cardiologist Dr Gale

## 2023-08-18 NOTE — DIETITIAN INITIAL EVALUATION ADULT - ENERGY INTAKE
Pt reports she loves the food and is eating well. Adequate (%) Pt reports she loves the food and is eating well; observed eating >75% of breakfast.

## 2023-08-18 NOTE — DIETITIAN INITIAL EVALUATION ADULT - REASON
Pt appears appropriately developed with no overt signs of muscle or fat depletion; no malnutrition risk factors.

## 2023-08-18 NOTE — DIETITIAN INITIAL EVALUATION ADULT - REASON INDICATOR FOR ASSESSMENT
Nutrition Assessment warranted for length of stay on SICU  Nutrition Consult for Pressure Injury >Stage 2 received and appreciated.   Information obtained from: medical record, 8ICU Interdisciplinary Rounds  Nutrition Assessment warranted for length of stay on SICU  Nutrition Consult for Pressure Injury >Stage 2 received and appreciated.   Information obtained from: patient, medical record, 8ICU Interdisciplinary Rounds

## 2023-08-18 NOTE — DISCHARGE NOTE NURSING/CASE MANAGEMENT/SOCIAL WORK - NSDCPEFALRISK_GEN_ALL_CORE
For information on Fall & Injury Prevention, visit: [email or web address] OR  [email or web address] OR  https://www.cdc.gov/steadi/patient.html

## 2023-08-18 NOTE — DISCHARGE NOTE NURSING/CASE MANAGEMENT/SOCIAL WORK - NSDCFUADDAPPT_GEN_ALL_CORE_FT
Please follow up with your oral surgeon that did the tooth extraction within 1-2 weeks.    Please follow up with the Wound Center at [address] [place] [address] regarding her sacral/buttock wound

## 2023-08-18 NOTE — PROGRESS NOTE ADULT - ASSESSMENT
54yo F w/ PMHx of AFib on Coumadin (last dose 8/14/23 7.5mg), Biventricular dysfunction w/ MV & TV replacement (2015 @ [place]), developmental delay, HTN, Anemia, Graves DX, Hypothyroid, Anemia, presents s/p tooth extraction w/ bleeding out her mouth.    Plan:  - Care per SICU  - Appreciate dental recs  - Appreciate cardiology recs  - Appreciate wound care recs       [facility], [id number]    
 Dominique Dinh  has a rich cardiac history congenital heart disease cardiomyopathy atrial fibrillation status post mitral valve repair and 2 tricuspid valve procedures most recent valve in valve with recent edema and signs of right heart failure relatively stable on torsemide beta-blocker with holding ramipril.  She is now admitted after a dental procedure and profound bleeding from the site secondary to elevated INR from her Coumadin.  Her blood pressure is somewhat soft and she is anemic and clearly had blood loss from the procedure. Sh is still mildy hypotensive and anxious and has had a fall in Hgb HCT No ruther active bleeding  .        Recommend   observe for 24 hours  replace postassium  hold coumadin and torsemide  continue beta blocker  restart coumadin tormrrow  presuming discharge friday if stable
54 yo F w/ PMHx of AFib on Coumadin (last dose 8/14/23 7.5mg), Biventricular dysfunction w/ MV & TV replacement (2015 @ [place]), developmental delay, HTN, Anemia, Graves DX, Hypothyroid, Anemia, presents s/p tooth extraction w/ bleeding out her mouth.    PLAN:   Neurologic: h/o developmental delay   - AOx4  - Pain control: tylenol prn     Respiratory:  - RA, satting well    Cardiovascular: afib, biventricular dysfunction; hx of MV and TV repair  - Hemodynamically stable, never on pressors   - Continue home metoprolol, spironolactone torsemide  - Restart Coumadin 8/18 at home dose (last dose 8/14/23 7.5mg),  - Appreciate cardiovascular recc    Gastrointestinal/Nutrition:  - Diet: Reg    Genitourinary/Renal:  - On home Torsemide  - Strict I&Os and replete electrolytes    Hematologic: bleeding s/p dental extraction   - Holding home coumadin  - Holding chemical VTE ppx  - Mechanical VT ppx: SCDs    Infectious Disease:  - Augmentin for s/p tooth extraction    Endocrine:  - Monitor glucose    Code: FULL   Disposition: SICU    
54 yo F w/ PMHx of AFib on Coumadin (last dose 8/14/23 7.5mg), Biventricular dysfunction w/ MV & TV replacement (2015 @ [place]), developmental delay, HTN, Anemia, Graves DX, Hypothyroid, Anemia, presents s/p tooth extraction w/ bleeding out her mouth.    PLAN:   Neurologic: h/o developmental delay   - AOx4  - Pain control: tylenol prn     Respiratory:  - RA, satting well    Cardiovascular: afib, biventricular dysfunction; hx of MV and TV repair  - Hemodynamically stable, never on pressors   - Continue home metoprolol, spironolactone torsemide  - Holding home Coumadin (last dose 8/14/23 7.5mg),    Gastrointestinal/Nutrition:  - Diet: CLD    Genitourinary/Renal:  - On home Torsemide  - Strict I&Os and replete electrolytes    Hematologic: bleeding s/p dental extraction   - Holding home coumadin  - Holding chemical VTE ppx  - Mechanical VT ppx: SCDs    Infectious Disease:  - Augmentin for s/p tooth extraction    Endocrine:  - Monitor glucose    Code: FULL   Disposition: SICU

## 2023-08-23 ENCOUNTER — NON-APPOINTMENT (OUTPATIENT)
Age: 53
End: 2023-08-23

## 2023-08-23 ENCOUNTER — LABORATORY RESULT (OUTPATIENT)
Age: 53
End: 2023-08-23

## 2023-08-24 LAB
ANION GAP SERPL CALC-SCNC: 8 MMOL/L
BUN SERPL-MCNC: 17 MG/DL
CALCIUM SERPL-MCNC: 9.1 MG/DL
CHLORIDE SERPL-SCNC: 90 MMOL/L
CO2 SERPL-SCNC: 29 MMOL/L
CREAT SERPL-MCNC: 0.66 MG/DL
EGFR: 104 ML/MIN/1.73M2
GLUCOSE SERPL-MCNC: 110 MG/DL
INR PPP: 2.42 RATIO
IRON SATN MFR SERPL: 9 %
IRON SERPL-MCNC: 36 UG/DL
MAGNESIUM SERPL-MCNC: 2 MG/DL
POTASSIUM SERPL-SCNC: 4.5 MMOL/L
PT BLD: 26.7 SEC
SODIUM SERPL-SCNC: 127 MMOL/L
TIBC SERPL-MCNC: 411 UG/DL
TSH SERPL-ACNC: 2.29 UIU/ML
UIBC SERPL-MCNC: 375 UG/DL

## 2023-08-29 ENCOUNTER — INPATIENT (INPATIENT)
Facility: HOSPITAL | Age: 53
LOS: 11 days | Discharge: HOME | DRG: 292 | End: 2023-09-10
Attending: INTERNAL MEDICINE | Admitting: STUDENT IN AN ORGANIZED HEALTH CARE EDUCATION/TRAINING PROGRAM
Payer: MEDICARE

## 2023-08-29 VITALS
RESPIRATION RATE: 18 BRPM | SYSTOLIC BLOOD PRESSURE: 106 MMHG | HEART RATE: 98 BPM | DIASTOLIC BLOOD PRESSURE: 66 MMHG | TEMPERATURE: 98 F | WEIGHT: 145.95 LBS | HEIGHT: 62 IN | OXYGEN SATURATION: 100 %

## 2023-08-29 DIAGNOSIS — D64.9 ANEMIA, UNSPECIFIED: ICD-10-CM

## 2023-08-29 DIAGNOSIS — Z95.2 PRESENCE OF PROSTHETIC HEART VALVE: Chronic | ICD-10-CM

## 2023-08-29 DIAGNOSIS — D70.3 NEUTROPENIA DUE TO INFECTION: ICD-10-CM

## 2023-08-29 DIAGNOSIS — I50.23 ACUTE ON CHRONIC SYSTOLIC (CONGESTIVE) HEART FAILURE: ICD-10-CM

## 2023-08-29 DIAGNOSIS — E03.9 HYPOTHYROIDISM, UNSPECIFIED: ICD-10-CM

## 2023-08-29 DIAGNOSIS — I10 ESSENTIAL (PRIMARY) HYPERTENSION: ICD-10-CM

## 2023-08-29 DIAGNOSIS — I48.20 CHRONIC ATRIAL FIBRILLATION, UNSPECIFIED: ICD-10-CM

## 2023-08-29 DIAGNOSIS — Z98.890 OTHER SPECIFIED POSTPROCEDURAL STATES: Chronic | ICD-10-CM

## 2023-08-29 DIAGNOSIS — M79.89 OTHER SPECIFIED SOFT TISSUE DISORDERS: ICD-10-CM

## 2023-08-29 PROBLEM — R62.50 UNSPECIFIED LACK OF EXPECTED NORMAL PHYSIOLOGICAL DEVELOPMENT IN CHILDHOOD: Chronic | Status: ACTIVE | Noted: 2023-08-16

## 2023-08-29 LAB
ALBUMIN SERPL ELPH-MCNC: 3.8 G/DL — SIGNIFICANT CHANGE UP (ref 3.3–5)
ALP SERPL-CCNC: 85 U/L — SIGNIFICANT CHANGE UP (ref 40–120)
ALT FLD-CCNC: 10 U/L — SIGNIFICANT CHANGE UP (ref 10–45)
ANION GAP SERPL CALC-SCNC: 14 MMOL/L — SIGNIFICANT CHANGE UP (ref 5–17)
APTT BLD: 42.3 SEC — HIGH (ref 24.5–35.6)
AST SERPL-CCNC: 40 U/L — SIGNIFICANT CHANGE UP (ref 10–40)
BASOPHILS # BLD AUTO: 0.07 K/UL — SIGNIFICANT CHANGE UP (ref 0–0.2)
BASOPHILS NFR BLD AUTO: 1.3 % — SIGNIFICANT CHANGE UP (ref 0–2)
BILIRUB SERPL-MCNC: 1.3 MG/DL — HIGH (ref 0.2–1.2)
BUN SERPL-MCNC: 14 MG/DL — SIGNIFICANT CHANGE UP (ref 7–23)
CALCIUM SERPL-MCNC: 8.6 MG/DL — SIGNIFICANT CHANGE UP (ref 8.4–10.5)
CHLORIDE SERPL-SCNC: 92 MMOL/L — LOW (ref 96–108)
CO2 SERPL-SCNC: 21 MMOL/L — LOW (ref 22–31)
CREAT SERPL-MCNC: 0.53 MG/DL — SIGNIFICANT CHANGE UP (ref 0.5–1.3)
EGFR: 110 ML/MIN/1.73M2 — SIGNIFICANT CHANGE UP
EOSINOPHIL # BLD AUTO: 0.11 K/UL — SIGNIFICANT CHANGE UP (ref 0–0.5)
EOSINOPHIL NFR BLD AUTO: 2 % — SIGNIFICANT CHANGE UP (ref 0–6)
GLUCOSE SERPL-MCNC: 166 MG/DL — HIGH (ref 70–99)
HCT VFR BLD CALC: 29.6 % — LOW (ref 34.5–45)
HGB BLD-MCNC: 9.7 G/DL — LOW (ref 11.5–15.5)
IMM GRANULOCYTES NFR BLD AUTO: 0.4 % — SIGNIFICANT CHANGE UP (ref 0–0.9)
INR BLD: 2.72 RATIO — HIGH (ref 0.85–1.18)
LYMPHOCYTES # BLD AUTO: 0.65 K/UL — LOW (ref 1–3.3)
LYMPHOCYTES # BLD AUTO: 12 % — LOW (ref 13–44)
MCHC RBC-ENTMCNC: 32.8 GM/DL — SIGNIFICANT CHANGE UP (ref 32–36)
MCHC RBC-ENTMCNC: 34.2 PG — HIGH (ref 27–34)
MCV RBC AUTO: 104.2 FL — HIGH (ref 80–100)
MONOCYTES # BLD AUTO: 0.64 K/UL — SIGNIFICANT CHANGE UP (ref 0–0.9)
MONOCYTES NFR BLD AUTO: 11.8 % — SIGNIFICANT CHANGE UP (ref 2–14)
NEUTROPHILS # BLD AUTO: 3.94 K/UL — SIGNIFICANT CHANGE UP (ref 1.8–7.4)
NEUTROPHILS NFR BLD AUTO: 72.5 % — SIGNIFICANT CHANGE UP (ref 43–77)
NRBC # BLD: 0 /100 WBCS — SIGNIFICANT CHANGE UP (ref 0–0)
NT-PROBNP SERPL-SCNC: 4440 PG/ML — HIGH (ref 0–300)
PLATELET # BLD AUTO: 155 K/UL — SIGNIFICANT CHANGE UP (ref 150–400)
POTASSIUM SERPL-MCNC: 4.6 MMOL/L — SIGNIFICANT CHANGE UP (ref 3.5–5.3)
POTASSIUM SERPL-SCNC: 4.6 MMOL/L — SIGNIFICANT CHANGE UP (ref 3.5–5.3)
PROT SERPL-MCNC: 6.7 G/DL — SIGNIFICANT CHANGE UP (ref 6–8.3)
PROTHROM AB SERPL-ACNC: 29.1 SEC — HIGH (ref 9.5–13)
RBC # BLD: 2.84 M/UL — LOW (ref 3.8–5.2)
RBC # FLD: 16.7 % — HIGH (ref 10.3–14.5)
SODIUM SERPL-SCNC: 127 MMOL/L — LOW (ref 135–145)
TROPONIN T, HIGH SENSITIVITY RESULT: 11 NG/L — SIGNIFICANT CHANGE UP (ref 0–51)
WBC # BLD: 5.43 K/UL — SIGNIFICANT CHANGE UP (ref 3.8–10.5)
WBC # FLD AUTO: 5.43 K/UL — SIGNIFICANT CHANGE UP (ref 3.8–10.5)

## 2023-08-29 PROCEDURE — 99285 EMERGENCY DEPT VISIT HI MDM: CPT | Mod: FS

## 2023-08-29 PROCEDURE — 93970 EXTREMITY STUDY: CPT | Mod: 26

## 2023-08-29 PROCEDURE — 99223 1ST HOSP IP/OBS HIGH 75: CPT

## 2023-08-29 PROCEDURE — 71045 X-RAY EXAM CHEST 1 VIEW: CPT | Mod: 26

## 2023-08-29 RX ORDER — FUROSEMIDE 40 MG
20 TABLET ORAL ONCE
Refills: 0 | Status: DISCONTINUED | OUTPATIENT
Start: 2023-08-29 | End: 2023-08-30

## 2023-08-29 RX ORDER — WARFARIN SODIUM 2.5 MG/1
7.5 TABLET ORAL ONCE
Refills: 0 | Status: COMPLETED | OUTPATIENT
Start: 2023-08-30 | End: 2023-08-30

## 2023-08-29 RX ORDER — ACETAMINOPHEN 500 MG
650 TABLET ORAL EVERY 6 HOURS
Refills: 0 | Status: DISCONTINUED | OUTPATIENT
Start: 2023-08-29 | End: 2023-09-10

## 2023-08-29 RX ORDER — METOPROLOL TARTRATE 50 MG
25 TABLET ORAL DAILY
Refills: 0 | Status: DISCONTINUED | OUTPATIENT
Start: 2023-08-29 | End: 2023-08-30

## 2023-08-29 RX ORDER — ONDANSETRON 8 MG/1
4 TABLET, FILM COATED ORAL EVERY 8 HOURS
Refills: 0 | Status: DISCONTINUED | OUTPATIENT
Start: 2023-08-29 | End: 2023-09-10

## 2023-08-29 RX ORDER — LEVOTHYROXINE SODIUM 125 MCG
175 TABLET ORAL DAILY
Refills: 0 | Status: DISCONTINUED | OUTPATIENT
Start: 2023-08-29 | End: 2023-08-30

## 2023-08-29 RX ORDER — LEVOTHYROXINE SODIUM 125 MCG
1 TABLET ORAL
Refills: 0 | DISCHARGE

## 2023-08-29 RX ORDER — LANOLIN ALCOHOL/MO/W.PET/CERES
3 CREAM (GRAM) TOPICAL AT BEDTIME
Refills: 0 | Status: DISCONTINUED | OUTPATIENT
Start: 2023-08-29 | End: 2023-09-10

## 2023-08-29 NOTE — ED PROVIDER NOTE - CLINICAL SUMMARY MEDICAL DECISION MAKING FREE TEXT BOX
Nixon COOLEY: 53-year-old female with a history of congenital heart disease s/p repair (as a child), atrial fibrillation on warfarin, biventricular HF, s/p MV & TV replacement (2015), s/p TV replacement (May 2023), developmental delay, HTN, anemia, Graves disease and anemia  here for evaluation of 20 pound weight gain in the past 2 weeks with increased bilateral lower extremity swelling.  Per mother at bedside, patient's nurse was very concerned for the weight gain.  Patient also lives independently and has been unable to navigate stairs secondary to the heaviness in her legs.  Patient denies any chest pain, shortness of breath.  Patient follows with Dr. Gale but he is currently on vacation.  Nurse gave patient torsemide 40 mg prior to sending her into the emergency department.  Patient denies any fevers, chills, bloody or black stools.  She reports she is urinating normally. On exam, patient has bilateral LE extremity swelling. She has 20 pound weight gain, retaining fluid.  Patient has congenital heart disease, TVR/MVR, A-fib on Coumadin.  She was given torsemide 40 mg prior to coming in.  Per mother, patient has been having difficulty navigating steps at home secondary to increased swelling in the legs plan for labs, lower extremity duplex and admission.

## 2023-08-29 NOTE — H&P ADULT - MLM HIDDEN
Prep Survey      Responses   [facility] facility patient discharged from?  June   Is LACE score < 7 ?  No   Emergency Room discharge w/ pulse ox?  No   Eligibility  [facility] June   Date of Admission  08/11/21   Date of Discharge  08/19/21   Discharge Disposition  Home or Self Care   Discharge diagnosis  PORTACATH PLACEMENT  Symptomatic anemia myelodysplastic syndrome  Pancytopenia   Does the patient have one of the following disease processes/diagnoses(primary or secondary)?  Other   Does the patient have Home health ordered?  No   Is there a DME ordered?  No   Prep survey completed?  Yes          Parris Palmer RN        
yes

## 2023-08-29 NOTE — H&P ADULT - NSHPLABSRESULTS_GEN_ALL_CORE
I personally reviewed the CXR: no consolidation. mildly increased vascular markings, may be consistent w/ fluid overload, no pleural effusion b/l, cardiomegaly.     I personally reviewed the EKG: afib, RBBB+LAFB (seen previously), no acute ischemic changes    CBC:                         9.7    5.43  )-----------( 155      ( 29 Aug 2023 18:57 )             29.6     Chem:   08-29    127<L>  |  92<L>  |  14  ----------------------------<  166<H>  4.6   |  21<L>  |  0.53    Ca    8.6      29 Aug 2023 18:57  Phos  2.4     08-29  Mg     2.0     08-29    TPro  6.7  /  Alb  3.8  /  TBili  1.3<H>  /  DBili  x   /  AST  40  /  ALT  10  /  AlkPhos  85  08-29

## 2023-08-29 NOTE — ED PROVIDER NOTE - OBJECTIVE STATEMENT
52 yo F with a PMHx of congenital heart disease s/p repair (as a child), atrial fibrillation on warfarin, biventricular HF, s/p MV & TV replacement (2015), s/p TV replacement (May 2023), developmental delay, HTN, anemia, Graves disease and anemia p/w worsening BLE swelling, and weight gain of 20lbs over 2 weeks. Was advised by visiting nurse today to give pt Torsemide 40mg (double her usual dose of 20mg/day). Is also on spironolactone 25mg daily which she took earlier. Recommended she come to ED for eval. Pt herself denies any chest pain or SOB. Denies nausea, vomiting, fever, chills, palpitations, headache, dizziness, weakness, syncope. Accompanied by mother. Follows with Cards, Dr. Gale. 54 yo F with a PMHx of congenital heart disease s/p repair (as a child), atrial fibrillation on warfarin, biventricular HF, s/p MV & TV replacement (2015), s/p TV replacement (May 2023), developmental delay, HTN, anemia, Graves disease and anemia p/w worsening BLE swelling, and weight gain of 20lbs over 2 weeks. Was advised by visiting nurse today to give pt Torsemide 40mg (double her usual dose of 20mg/day). Is also on spironolactone 25mg daily which she took earlier. Recommended she come to ED for eval. Pt herself denies any chest pain or SOB. Denies nausea, vomiting, fever, chills, palpitations, headache, dizziness, weakness, syncope. Accompanied by mother. Follows with Cards, Dr. Gale.    Per chart review, pts last ECHO was 06/22/23 which revealed  "Moderately dilated left ventricular cavity size. The left ventricular systolic function is moderately decreased with an ejection fraction of 29%."

## 2023-08-29 NOTE — H&P ADULT - ASSESSMENT
53F PMhx significant for HFrEF, afib on coumadin (s/p MV and TV repair), recent hemorrhage after tooth extraction on coumadin.   Presenting w/ 20lb weight gain in 2 weeks and BLE swelling.

## 2023-08-29 NOTE — ED PROVIDER NOTE - NS ED ATTENDING STATEMENT MOD
Attending with This was a shared visit with the SOWMYA. I reviewed and verified the documentation and independently performed the documented:

## 2023-08-29 NOTE — H&P ADULT - NSHPPHYSICALEXAM_GEN_ALL_CORE
Vital Signs Last 24 Hrs  T(C): 36.4 (29 Aug 2023 22:20), Max: 36.4 (29 Aug 2023 18:07)  T(F): 97.5 (29 Aug 2023 22:20), Max: 97.6 (29 Aug 2023 18:07)  HR: 90 (29 Aug 2023 22:20) (90 - 98)  BP: 103/54 (29 Aug 2023 22:20) (103/54 - 106/66)  BP(mean): 69 (29 Aug 2023 22:20) (69 - 69)  RR: 18 (29 Aug 2023 22:20) (18 - 18)  SpO2: 98% (29 Aug 2023 22:20) (98% - 100%)    Parameters below as of 29 Aug 2023 22:20  Patient On (Oxygen Delivery Method): room air        CONSTITUTIONAL: Well-groomed, in no apparent distress  EYES: No conjunctival or scleral injection, non-icteric  ENMT: No external nasal lesions; MMM  RESPIRATORY: Breathing comfortably; lungs CTA without wheeze/rhonchi/rales  CARDIOVASCULAR: +S1S2, irregularly irregular, normal rate; pedal pulses full and symmetric; BLE pitting edema 2+ up to knee   GASTROINTESTINAL: No tenderness, +BS throughout, no rebound/guarding  SKIN: No rashes or ulcers noted  NEUROLOGIC: No gross focal neurological deficits, AAOX3  PSYCHIATRIC: mood and affect appropriate; appropriate insight and judgment

## 2023-08-29 NOTE — H&P ADULT - CLICK TO LAUNCH ORM
HPI:  4/10/22, Time: 12:40 PM EDT         Donaldo Lopez is a 33 y.o. male presenting to the ED for nasal congestion and sinus pressure, beginning 8 days ago. The complaint has been persistent, moderate in severity, and worsened by nothing. Patient reports that his symptoms have not improved over the last week which is what prompted evaluation in urgent care today. Mild nonproductive cough. Fairly significant drainage. Still has had congestion is starting to lose his voice. No difficulty breathing or swallowing. No chest pain. Patient reports a coworker's child was sick with similar symptoms however improved fairly quickly. Afebrile without recent travel. Patient denies all other symptoms at this time. Review of Systems:   A complete review of systems was performed and pertinent positives and negatives are stated within HPI, all other systems reviewed and are negative.          --------------------------------------------- PAST HISTORY ---------------------------------------------  Past Medical History:  has no past medical history on file. Past Surgical History:  has a past surgical history that includes Ankle fracture surgery (Left, 4/19/2019). Social History:  reports that he has been smoking cigarettes. He has been smoking about 0.25 packs per day. He has quit using smokeless tobacco.  His smokeless tobacco use included chew. He reports current alcohol use. He reports that he does not use drugs. Family History: family history is not on file. The patients home medications have been reviewed. Allergies: Pcn [penicillins]    -------------------------------------------------- RESULTS -------------------------------------------------  All laboratory and radiology results have been personally reviewed by myself   LABS:  No results found for this visit on 04/10/22.     RADIOLOGY:  Interpreted by Radiologist.  No orders to display       ------------------------- NURSING NOTES AND VITALS REVIEWED ---------------------------   The nursing notes within the ED encounter and vital signs as below have been reviewed. BP (!) 100/55   Pulse 89   Temp 98.6 °F (37 °C) (Infrared)   Resp 18   Ht 5' 7\" (1.702 m)   Wt 197 lb (89.4 kg)   SpO2 98%   BMI 30.85 kg/m²   Oxygen Saturation Interpretation: Normal      ---------------------------------------------------PHYSICAL EXAM--------------------------------------      Constitutional/General: Alert and oriented x3, well appearing, non toxic in NAD  Head: Normocephalic and atraumatic. Tenderness to palpation to maxillary sinuses. Eyes: PERRL, EOMI  Mouth: Oropharynx clear, handling secretions, no trismus  Neck: Supple, full ROM,   Pulmonary: Lungs clear to auscultation bilaterally, no wheezes, rales, or rhonchi. Not in respiratory distress  Cardiovascular:  Regular rate and rhythm, no murmurs, gallops, or rubs. 2+ distal pulses  Abdomen: Soft, non tender, non distended,   Extremities: Moves all extremities x 4. Warm and well perfused  Skin: warm and dry without rash  Neurologic: GCS 15,  Psych: Normal Affect      ------------------------------ ED COURSE/MEDICAL DECISION MAKING----------------------  Medications - No data to display      ED COURSE:       Medical Decision Making:    Patient is a 24-year-old male presenting emergency department with sinus congestion over the last week. Symptoms not improving therefore will initiate antibiotic therapy. Patient is nontoxic-appearing, afebrile, and in no acute distress. Advise follow-up with PCP for recheck return emergency department with any new or worsening symptoms. Patient voiced understanding is agreeable to the above treatment plan. Counseling: The emergency provider has spoken with the patient and discussed todays results, in addition to providing specific details for the plan of care and counseling regarding the diagnosis and prognosis.   Questions are answered at this time and they are agreeable with the plan.      --------------------------------- IMPRESSION AND DISPOSITION ---------------------------------    IMPRESSION  1. Acute non-recurrent maxillary sinusitis        DISPOSITION  Disposition: Discharge to home  Patient condition is stable      NOTE: This report was transcribed using voice recognition software.  Every effort was made to ensure accuracy; however, inadvertent computerized transcription errors may be present        America Vo  04/10/22 1290 .

## 2023-08-29 NOTE — ED PROVIDER NOTE - RAPID ASSESSMENT
53y F PMHx of congenital heart disease s/p repair (as a child), atrial fibrillation on warfarin, biventricular HF, s/p MV & TV replacement (2015), s/p TV replacement (May 2023), developmental delay, HTN, anemia, Graves disease, hypothyroidism, anemia, recent admission 8/16-8/18 for hemorrhage s/p tooth extraction, presents to ED accompanied by mother c/o worsening LE w/ 20lb weight gain x 2 weeks. Referred to ED by visiting nurse. Took 40mg Torsemide (double daily dose) today.  Taking coumadin as prescribed.  No sob, cough, f/c. No acute distress in triage.    Patient was seen as a QPA patient. The patient will be seen and further worked up in the main emergency department and their care will be completed by the main emergency department team along with a thorough physical exam. Receiving team will follow up on labs, analgesia, any clinical imaging, reassess and disposition as clinically indicated, all decisions regarding the progression of care will be made at their discretion. - Rhiannon GREER

## 2023-08-29 NOTE — H&P ADULT - HISTORY OF PRESENT ILLNESS
53F PMHx significant for Afib on Coumadin (7.5mg daily) developmental delay, HTN, Hypothyroid, MV and TV repair '15 @ [place], and TV repair in March '23  @ [facility]. Recently admitted for hemorrhage after tooth extraction 2/2 elevated INR from coumadin, dc'd on 8/18/23.    Pt comes to ED today referred by visiting nurse, who noticed 20lb weight gain in 2 weeks. Pt called his cardiologist, who recommended that she take double dose of her torsemide today, which she did before coming to ED.   On presentation, pt was afebrile, HR 98, /66 and on room air 100% with RR 18.   Labs significant for hgb 9.7 (was 9.1 last discharge; previous baseline in 12-13),  (was 101 at last discharge), plt 155, INR 2.72, Na 127, Cr 0.53, bili 1.3, phos 2.4, trop 11, and BMP 4440 (previous in 3K)  53F PMHx significant for Afib on Coumadin (7.5mg daily) developmental delay, HTN, Hypothyroid, MV and TV repair '15 @ [place], and TV repair in March '23  @ [facility]. Recently admitted for hemorrhage after tooth extraction 2/2 elevated INR from coumadin, dc'd on 8/18/23.    Pt comes to ED today referred by visiting nurse, who noticed 20lb weight gain in 2 weeks. Pt called his cardiologist, who recommended that she take double dose of her torsemide today, which she did before coming to ED.   On presentation, pt was afebrile, HR 98, /66 and on room air 100% with RR 18.   Labs significant for hgb 9.7 (was 9.1 last discharge; previous baseline in 12-13),  (was 101 at last discharge), plt 155, INR 2.72, Na 127, Cr 0.53, bili 1.3, phos 2.4, trop 11, and BMP 4440 (previous in 3K)     On interview, pt is AAOx4 and in no distress. Denies fever, chills, HA, dizziness, CP, SOB, abd pain, n/v/d. Reports BLE swelling although cannot remember when it started.

## 2023-08-29 NOTE — H&P ADULT - PROBLEM SELECTOR PLAN 2
-at last admission, after hemorrhage, pt was dc'd at hgb 9.1  -this admission, 9.7.   -macrocytic.   >f/u folate/b12

## 2023-08-29 NOTE — ED ADULT NURSE NOTE - OBJECTIVE STATEMENT
52 y/o Female presents to ED from home with c/o edema. PMH of congenital heart disease, afib (on warfarin), HTN, anemia, Developmental delay, graves disease. Pt states she has had worsening Lower extremity edema for the past 2 weeks. and 20lbs of weight gain. Endorses pain to LE with touch. Denies SOB, CP, HA, fever, chills, N/V/D. Pt is A&Ox3, well appearing/ speaking full sentences. Airway patent with spontaneous unlabored breathing, Equal B/L upper and lower extremity strength, skin is warm/ LE +2 edema. RLE erythema and tender to touch. No diaphoresis noted. Safety maintained bed is in the lowest position, locked and call bell in reach.

## 2023-08-29 NOTE — ED PROVIDER NOTE - PHYSICAL EXAMINATION
CONSTITUTIONAL: In no apparent distress.  ENT: Airway patent, moist mucous membranes.   EYES: Pupils equal, round and reactive to light. EOMI. Conjunctiva normal appearing.   CARDIAC: Normal rate, regular rhythm.  Heart sounds S1, S2.    RESPIRATORY: Breath sounds clear and equal bilaterally.   GASTROINTESTINAL: Abdomen soft, non-tender, not distended.  MUSCULOSKELETAL: Spine appears normal. BLE swelling, no calf TTP.   NEUROLOGICAL: Alert and oriented x3, no focal deficits, no motor or sensory deficits. 5/5 muscle strength throughout.  SKIN: Skin normal color, warm, dry and intact.   PSYCHIATRIC: Normal mood and affect.

## 2023-08-29 NOTE — H&P ADULT - PROBLEM SELECTOR PLAN 1
-Echo 6/22/23   LVEF 29%, RV w/ reduced systolic function, severely dilated b/l atria   -elevated BNP this time, increase in weight by 20lb in 2 weeks  -SBP in low 100s after taking double dose of torsemide at home  >will order for lasix IVP 20 and monitor BP closely   >hold home torsemide and metolazone  >hold aldactone  >will decrease toprol to 25 (home dose 50) -Echo 6/22/23   LVEF 29%, RV w/ reduced systolic function, severely dilated b/l atria   -elevated BNP this time, increase in weight by 20lb in 2 weeks  -SBP in low 100s after taking double dose of torsemide at home  -BLE duplex neg for VTE   >will order for lasix IVP 20 and monitor BP closely   >hold home torsemide and metolazone  >hold aldactone  >will decrease toprol to 25 (home dose 50)

## 2023-08-29 NOTE — ED PROVIDER NOTE - ATTENDING APP SHARED VISIT CONTRIBUTION OF CARE
Patient is a 53-year-old female with a history of congenital heart disease s/p repair (as a child), atrial fibrillation on warfarin, biventricular HF, s/p MV & TV replacement (2015), s/p TV replacement (May 2023), developmental delay, HTN, anemia, Graves disease and anemia  here for evaluation of 20 pound weight gain in the past 2 weeks with increased bilateral lower extremity swelling.  Per mother at bedside, patient's nurse was very concerned for the weight gain.  Patient also lives independently and has been unable to navigate stairs secondary to the heaviness in her legs.  Patient denies any chest pain, shortness of breath.  Patient follows with Dr. Gale but he is currently on vacation.  Nurse gave patient torsemide 40 mg prior to sending her into the emergency department.  Patient denies any fevers, chills, bloody or black stools.  She reports she is urinating normally.     VS noted  Gen. no acute distress, Non toxic   HEENT: EOMI, mmm  Lungs: CTAB/L no C/ W /R   CVS: RRR   Abd; Soft non tender, non distended   Ext:  bilateral lower extremity edema  Skin: no rash  Neuro AAOx3 non focal clear speech  a/p:  lower extremity swelling–patient has 20 pound weight gain, retaining fluid.  Patient has congenital heart disease, TVR/MVR, A-fib on Coumadin.  She was given torsemide 40 mg prior to coming in.  Per mother, patient has been having difficulty navigating steps at home secondary to increased swelling in the legs plan for labs, lower extremity duplex and admission.  - Nixon COOLEY

## 2023-08-29 NOTE — H&P ADULT - NSHPADDITIONALINFOADULT_GEN_ALL_CORE
Fluid: restrict 1.5L   Electrolytes: replete potassium, phosphorus and magnesium to 4/3/2 respectively  Nutrition: DASH   Activity: as tolerated     VTE ppx: home warfarin   GI ppx: not indicated    Couldn't get in touch w/ family overnight. Please update them in the morning.

## 2023-08-30 DIAGNOSIS — E87.1 HYPO-OSMOLALITY AND HYPONATREMIA: ICD-10-CM

## 2023-08-30 DIAGNOSIS — I50.811 ACUTE RIGHT HEART FAILURE: ICD-10-CM

## 2023-08-30 DIAGNOSIS — I50.813 ACUTE ON CHRONIC RIGHT HEART FAILURE: ICD-10-CM

## 2023-08-30 DIAGNOSIS — I50.810 RIGHT HEART FAILURE, UNSPECIFIED: ICD-10-CM

## 2023-08-30 LAB
ANION GAP SERPL CALC-SCNC: 13 MMOL/L — SIGNIFICANT CHANGE UP (ref 5–17)
ANION GAP SERPL CALC-SCNC: 15 MMOL/L — SIGNIFICANT CHANGE UP (ref 5–17)
APTT BLD: 42.8 SEC — HIGH (ref 24.5–35.6)
BASE EXCESS BLDV CALC-SCNC: 5.5 MMOL/L — HIGH (ref -2–3)
BASOPHILS # BLD AUTO: 0.06 K/UL — SIGNIFICANT CHANGE UP (ref 0–0.2)
BASOPHILS NFR BLD AUTO: 1.1 % — SIGNIFICANT CHANGE UP (ref 0–2)
BUN SERPL-MCNC: 13 MG/DL — SIGNIFICANT CHANGE UP (ref 7–23)
BUN SERPL-MCNC: 15 MG/DL — SIGNIFICANT CHANGE UP (ref 7–23)
CA-I SERPL-SCNC: 1.13 MMOL/L — LOW (ref 1.15–1.33)
CALCIUM SERPL-MCNC: 8.5 MG/DL — SIGNIFICANT CHANGE UP (ref 8.4–10.5)
CALCIUM SERPL-MCNC: 8.6 MG/DL — SIGNIFICANT CHANGE UP (ref 8.4–10.5)
CHLORIDE BLDV-SCNC: 96 MMOL/L — SIGNIFICANT CHANGE UP (ref 96–108)
CHLORIDE SERPL-SCNC: 94 MMOL/L — LOW (ref 96–108)
CHLORIDE SERPL-SCNC: 95 MMOL/L — LOW (ref 96–108)
CO2 BLDV-SCNC: 31 MMOL/L — HIGH (ref 22–26)
CO2 SERPL-SCNC: 24 MMOL/L — SIGNIFICANT CHANGE UP (ref 22–31)
CO2 SERPL-SCNC: 26 MMOL/L — SIGNIFICANT CHANGE UP (ref 22–31)
CREAT SERPL-MCNC: 0.49 MG/DL — LOW (ref 0.5–1.3)
CREAT SERPL-MCNC: 0.52 MG/DL — SIGNIFICANT CHANGE UP (ref 0.5–1.3)
EGFR: 110 ML/MIN/1.73M2 — SIGNIFICANT CHANGE UP
EGFR: 112 ML/MIN/1.73M2 — SIGNIFICANT CHANGE UP
EOSINOPHIL # BLD AUTO: 0.09 K/UL — SIGNIFICANT CHANGE UP (ref 0–0.5)
EOSINOPHIL NFR BLD AUTO: 1.6 % — SIGNIFICANT CHANGE UP (ref 0–6)
FOLATE SERPL-MCNC: 7.9 NG/ML — SIGNIFICANT CHANGE UP
GAS PNL BLDV: 130 MMOL/L — LOW (ref 136–145)
GAS PNL BLDV: SIGNIFICANT CHANGE UP
GAS PNL BLDV: SIGNIFICANT CHANGE UP
GLUCOSE BLDV-MCNC: 110 MG/DL — HIGH (ref 70–99)
GLUCOSE SERPL-MCNC: 112 MG/DL — HIGH (ref 70–99)
GLUCOSE SERPL-MCNC: 97 MG/DL — SIGNIFICANT CHANGE UP (ref 70–99)
HCO3 BLDV-SCNC: 30 MMOL/L — HIGH (ref 22–29)
HCT VFR BLD CALC: 29.1 % — LOW (ref 34.5–45)
HCT VFR BLDA CALC: 28 % — LOW (ref 34.5–46.5)
HGB BLD CALC-MCNC: 9.2 G/DL — LOW (ref 11.7–16.1)
HGB BLD-MCNC: 9.3 G/DL — LOW (ref 11.5–15.5)
IMM GRANULOCYTES NFR BLD AUTO: 0.4 % — SIGNIFICANT CHANGE UP (ref 0–0.9)
INR BLD: 2.63 RATIO — HIGH (ref 0.85–1.18)
LACTATE BLDV-MCNC: 1.1 MMOL/L — SIGNIFICANT CHANGE UP (ref 0.5–2)
LYMPHOCYTES # BLD AUTO: 0.65 K/UL — LOW (ref 1–3.3)
LYMPHOCYTES # BLD AUTO: 11.6 % — LOW (ref 13–44)
MAGNESIUM SERPL-MCNC: 1.9 MG/DL — SIGNIFICANT CHANGE UP (ref 1.6–2.6)
MCHC RBC-ENTMCNC: 32 GM/DL — SIGNIFICANT CHANGE UP (ref 32–36)
MCHC RBC-ENTMCNC: 33.3 PG — SIGNIFICANT CHANGE UP (ref 27–34)
MCV RBC AUTO: 104.3 FL — HIGH (ref 80–100)
MONOCYTES # BLD AUTO: 0.63 K/UL — SIGNIFICANT CHANGE UP (ref 0–0.9)
MONOCYTES NFR BLD AUTO: 11.3 % — SIGNIFICANT CHANGE UP (ref 2–14)
NEUTROPHILS # BLD AUTO: 4.15 K/UL — SIGNIFICANT CHANGE UP (ref 1.8–7.4)
NEUTROPHILS NFR BLD AUTO: 74 % — SIGNIFICANT CHANGE UP (ref 43–77)
NRBC # BLD: 0 /100 WBCS — SIGNIFICANT CHANGE UP (ref 0–0)
PCO2 BLDV: 42 MMHG — SIGNIFICANT CHANGE UP (ref 39–42)
PH BLDV: 7.46 — HIGH (ref 7.32–7.43)
PLATELET # BLD AUTO: 129 K/UL — LOW (ref 150–400)
PO2 BLDV: 74 MMHG — HIGH (ref 25–45)
POTASSIUM BLDV-SCNC: 3.4 MMOL/L — LOW (ref 3.5–5.1)
POTASSIUM SERPL-MCNC: 3.2 MMOL/L — LOW (ref 3.5–5.3)
POTASSIUM SERPL-MCNC: 3.4 MMOL/L — LOW (ref 3.5–5.3)
POTASSIUM SERPL-SCNC: 3.2 MMOL/L — LOW (ref 3.5–5.3)
POTASSIUM SERPL-SCNC: 3.4 MMOL/L — LOW (ref 3.5–5.3)
PROTHROM AB SERPL-ACNC: 26.8 SEC — HIGH (ref 9.5–13)
RBC # BLD: 2.79 M/UL — LOW (ref 3.8–5.2)
RBC # FLD: 16.6 % — HIGH (ref 10.3–14.5)
SAO2 % BLDV: 97 % — HIGH (ref 67–88)
SODIUM SERPL-SCNC: 133 MMOL/L — LOW (ref 135–145)
SODIUM SERPL-SCNC: 134 MMOL/L — LOW (ref 135–145)
TSH SERPL-MCNC: 2.88 UIU/ML — SIGNIFICANT CHANGE UP (ref 0.27–4.2)
VIT B12 SERPL-MCNC: 814 PG/ML — SIGNIFICANT CHANGE UP (ref 232–1245)
WBC # BLD: 5.6 K/UL — SIGNIFICANT CHANGE UP (ref 3.8–10.5)
WBC # FLD AUTO: 5.6 K/UL — SIGNIFICANT CHANGE UP (ref 3.8–10.5)

## 2023-08-30 PROCEDURE — 99233 SBSQ HOSP IP/OBS HIGH 50: CPT

## 2023-08-30 RX ORDER — METOPROLOL TARTRATE 50 MG
50 TABLET ORAL DAILY
Refills: 0 | Status: DISCONTINUED | OUTPATIENT
Start: 2023-08-31 | End: 2023-09-10

## 2023-08-30 RX ORDER — POTASSIUM CHLORIDE 20 MEQ
40 PACKET (EA) ORAL ONCE
Refills: 0 | Status: COMPLETED | OUTPATIENT
Start: 2023-08-30 | End: 2023-08-30

## 2023-08-30 RX ORDER — SPIRONOLACTONE 25 MG/1
25 TABLET, FILM COATED ORAL DAILY
Refills: 0 | Status: DISCONTINUED | OUTPATIENT
Start: 2023-08-30 | End: 2023-09-03

## 2023-08-30 RX ADMIN — Medication 40 MILLIEQUIVALENT(S): at 14:20

## 2023-08-30 RX ADMIN — Medication 175 MICROGRAM(S): at 06:23

## 2023-08-30 RX ADMIN — WARFARIN SODIUM 7.5 MILLIGRAM(S): 2.5 TABLET ORAL at 21:19

## 2023-08-30 RX ADMIN — Medication 25 MILLIGRAM(S): at 06:22

## 2023-08-30 RX ADMIN — Medication 40 MILLIEQUIVALENT(S): at 21:19

## 2023-08-30 NOTE — PATIENT PROFILE ADULT - FALL HARM RISK - HARM RISK INTERVENTIONS
Assistance with ambulation/Assistance OOB with selected safe patient handling equipment/Communicate Risk of Fall with Harm to all staff/Discuss with provider need for PT consult/Monitor gait and stability/Reinforce activity limits and safety measures with patient and family/Tailored Fall Risk Interventions/Visual Cue: Yellow wristband and red socks/Bed in lowest position, wheels locked, appropriate side rails in place/Call bell, personal items and telephone in reach/Instruct patient to call for assistance before getting out of bed or chair/Non-slip footwear when patient is out of bed/[place] to call system/Physically safe environment - no spills, clutter or unnecessary equipment/Purposeful Proactive Rounding/Room/bathroom lighting operational, light cord in reach

## 2023-08-30 NOTE — PROGRESS NOTE ADULT - PROBLEM SELECTOR PLAN 2
Hypervolemic hyponatremia, unclear chronicity, sNa 127 on admission -> 133 without any intervention  - goal sNa increase no more than 8 in 24 hrs (holding diuretics to prevent overcorrecting sodium)  - monitor sNa in PM Hypervolemic hyponatremia, unclear chronicity, sNa 127 on admission -> 133 without any intervention  - goal sNa increase no more than 8 in 24 hrs (holding diuretics to prevent overcorrecting sodium)  - monitor sNa in PM  - fluid restriction

## 2023-08-30 NOTE — PROGRESS NOTE ADULT - ASSESSMENT
53F with biventricular HF, afib on coumadin (s/p MV and TV repair), recent hemorrhage after tooth extraction on coumadin, admitted for right heart failure exacerbation with 20lb weight gain and leg swelling over 2 weeks.

## 2023-08-30 NOTE — PROGRESS NOTE ADULT - SUBJECTIVE AND OBJECTIVE BOX
[facility] Division of Hospital Medicine  Cory Mahoney MD  Available via MS Teams    SUBJECTIVE / OVERNIGHT EVENTS: No acute events overnight. Pt seen and examined at bedside. Denies any chest pain or sob.     ADDITIONAL REVIEW OF SYSTEMS:    MEDICATIONS  (STANDING):  levothyroxine 175 MICROGram(s) Oral daily  metoprolol succinate ER 25 milliGRAM(s) Oral daily  warfarin 7.5 milliGRAM(s) Oral once    MEDICATIONS  (PRN):  acetaminophen     Tablet .. 650 milliGRAM(s) Oral every 6 hours PRN Temp greater or equal to 38C (100.4F), Mild Pain (1 - 3)  aluminum hydroxide/magnesium hydroxide/simethicone Suspension 30 milliLiter(s) Oral every 4 hours PRN Dyspepsia  melatonin 3 milliGRAM(s) Oral at bedtime PRN Insomnia  ondansetron Injectable 4 milliGRAM(s) IV Push every 8 hours PRN Nausea and/or Vomiting      I&O's Summary    PHYSICAL EXAM:  Vital Signs Last 24 Hrs  T(C): 36.6 (30 Aug 2023 12:45), Max: 36.6 (30 Aug 2023 12:45)  T(F): 97.8 (30 Aug 2023 12:45), Max: 97.8 (30 Aug 2023 12:45)  HR: 91 (30 Aug 2023 12:45) (74 - 98)  BP: 99/64 (30 Aug 2023 12:45) (99/64 - 116/88)  BP(mean): 98 (30 Aug 2023 02:00) (69 - 98)  RR: 18 (30 Aug 2023 12:45) (18 - 18)  SpO2: 100% (30 Aug 2023 12:45) (97% - 100%)    Parameters below as of 30 Aug 2023 12:45  Patient On (Oxygen Delivery Method): room air    CONSTITUTIONAL: NAD, pleasant  NECK: Supple, no palpable masses; no thyromegaly  RESPIRATORY: Normal respiratory effort; lungs are clear to auscultation bilaterally  CARDIOVASCULAR: Regular rate and rhythm, normal S1 and S2, no murmur/rub/gallop; b/l 2-3+ symmetric LE pitting edema   ABDOMEN: Nontender to palpation, normoactive bowel sounds  MUSCULOSKELETAL: no clubbing or cyanosis of digits; no joint swelling or tenderness to palpation  PSYCH: A+O to person, place, and time; affect appropriate  NEUROLOGY: CN 2-12 are intact and symmetric; no gross sensory deficits   SKIN: No rashes; no palpable lesions    LABS:                        9.3    5.60  )-----------( 129      ( 30 Aug 2023 07:47 )             29.1     08-30    133<L>  |  94<L>  |  13  ----------------------------<  97  3.2<L>   |  24  |  0.49<L>    Ca    8.5      30 Aug 2023 07:47  Phos  2.4     08-29  Mg     1.9     08-30    TPro  6.7  /  Alb  3.8  /  TBili  1.3<H>  /  DBili  x   /  AST  40  /  ALT  10  /  AlkPhos  85  08-29    PT/INR - ( 30 Aug 2023 07:47 )   PT: 26.8 sec;   INR: 2.63 ratio         PTT - ( 30 Aug 2023 07:47 )  PTT:42.8 sec      Urinalysis Basic - ( 30 Aug 2023 07:47 )    Color: x / Appearance: x / SG: x / pH: x  Gluc: 97 mg/dL / Ketone: x  / Bili: x / Urobili: x   Blood: x / Protein: x / Nitrite: x   Leuk Esterase: x / RBC: x / WBC x   Sq Epi: x / Non Sq Epi: x / Bacteria: x      RADIOLOGY & ADDITIONAL TESTS:  New Results Reviewed Today:   New Imaging Personally Reviewed Today:  New Electrocardiogram Personally Reviewed Today:  Prior or Outpatient Records Reviewed Today:    COMMUNICATION:  Care Discussed with Consultants/Other Providers and Details of Discussion: Discussed with ACP  Discussions with Patient/Family:  PCP Communication:

## 2023-08-31 LAB
ANION GAP SERPL CALC-SCNC: 10 MMOL/L — SIGNIFICANT CHANGE UP (ref 5–17)
BUN SERPL-MCNC: 15 MG/DL — SIGNIFICANT CHANGE UP (ref 7–23)
CALCIUM SERPL-MCNC: 8.8 MG/DL — SIGNIFICANT CHANGE UP (ref 8.4–10.5)
CHLORIDE SERPL-SCNC: 95 MMOL/L — LOW (ref 96–108)
CO2 SERPL-SCNC: 27 MMOL/L — SIGNIFICANT CHANGE UP (ref 22–31)
CREAT SERPL-MCNC: 0.56 MG/DL — SIGNIFICANT CHANGE UP (ref 0.5–1.3)
EGFR: 108 ML/MIN/1.73M2 — SIGNIFICANT CHANGE UP
GLUCOSE SERPL-MCNC: 152 MG/DL — HIGH (ref 70–99)
INR BLD: 2.06 RATIO — HIGH (ref 0.85–1.18)
MAGNESIUM SERPL-MCNC: 2.1 MG/DL — SIGNIFICANT CHANGE UP (ref 1.6–2.6)
PHOSPHATE SERPL-MCNC: 2 MG/DL — LOW (ref 2.5–4.5)
POTASSIUM SERPL-MCNC: 3.9 MMOL/L — SIGNIFICANT CHANGE UP (ref 3.5–5.3)
POTASSIUM SERPL-SCNC: 3.9 MMOL/L — SIGNIFICANT CHANGE UP (ref 3.5–5.3)
PROTHROM AB SERPL-ACNC: 21.2 SEC — HIGH (ref 9.5–13)
SODIUM SERPL-SCNC: 132 MMOL/L — LOW (ref 135–145)

## 2023-08-31 PROCEDURE — 99232 SBSQ HOSP IP/OBS MODERATE 35: CPT

## 2023-08-31 PROCEDURE — 99222 1ST HOSP IP/OBS MODERATE 55: CPT | Mod: GC

## 2023-08-31 PROCEDURE — 93971 EXTREMITY STUDY: CPT | Mod: 26,RT

## 2023-08-31 PROCEDURE — 93306 TTE W/DOPPLER COMPLETE: CPT | Mod: 26

## 2023-08-31 RX ORDER — FUROSEMIDE 40 MG
40 TABLET ORAL EVERY 12 HOURS
Refills: 0 | Status: DISCONTINUED | OUTPATIENT
Start: 2023-08-31 | End: 2023-09-01

## 2023-08-31 RX ORDER — WARFARIN SODIUM 2.5 MG/1
7.5 TABLET ORAL ONCE
Refills: 0 | Status: COMPLETED | OUTPATIENT
Start: 2023-08-31 | End: 2023-08-31

## 2023-08-31 RX ORDER — SODIUM,POTASSIUM PHOSPHATES 278-250MG
1 POWDER IN PACKET (EA) ORAL ONCE
Refills: 0 | Status: COMPLETED | OUTPATIENT
Start: 2023-08-31 | End: 2023-08-31

## 2023-08-31 RX ADMIN — WARFARIN SODIUM 7.5 MILLIGRAM(S): 2.5 TABLET ORAL at 22:13

## 2023-08-31 RX ADMIN — Medication 1 PACKET(S): at 10:57

## 2023-08-31 RX ADMIN — Medication 40 MILLIGRAM(S): at 17:04

## 2023-08-31 RX ADMIN — SPIRONOLACTONE 25 MILLIGRAM(S): 25 TABLET, FILM COATED ORAL at 05:11

## 2023-08-31 RX ADMIN — Medication 50 MILLIGRAM(S): at 05:10

## 2023-08-31 RX ADMIN — Medication 40 MILLIGRAM(S): at 10:40

## 2023-08-31 NOTE — DIETITIAN INITIAL EVALUATION ADULT - NS FNS DIET ORDER
Diet, Regular:   DASH/TLC {Sodium & Cholesterol Restricted} (DASH)  1500mL Fluid Restriction ([id number]) (08-29-23 @ 23:09)

## 2023-08-31 NOTE — DIETITIAN INITIAL EVALUATION ADULT - OTHER INFO
- HbA1c of 6.1% noted from May 2023.    Weight Hx:  - Per chart, pt with 20lb wt gain x 2 weeks PTA prompting admission to [facility]. Pt reports UBW in the 140s, pt has scale that tracks daily weights on her phone. Dosing wt 146lbs; current wt 152.3lbs.

## 2023-08-31 NOTE — CONSULT NOTE ADULT - ATTENDING COMMENTS
Agree with fellow's attestation  1. ADHF  2. Afib w/ RVR   3. Mixed Valve Disease     Plan:   IV diuresis  TTE for evaluation of cardiac structure and function  Titrate metoprolol as needed to achieve rate control. On Coumadin

## 2023-08-31 NOTE — DIETITIAN INITIAL EVALUATION ADULT - ADD RECOMMEND
Defer fluid restriction to team. Provide encouragement with PO intake, menu selections, and assistance with meals as needed. Reinforce nutrition education as needed - RD remains available. Continue to monitor nutritional intake, labs, weights, BM, skin, clinical course.

## 2023-08-31 NOTE — DIETITIAN INITIAL EVALUATION ADULT - PERTINENT LABORATORY DATA
08-30    134<L>  |  95<L>  |  15  ----------------------------<  112<H>  3.4<L>   |  26  |  0.52    Ca    8.6      30 Aug 2023 17:36  Phos  2.0     08-31  Mg     2.1     08-31    TPro  6.7  /  Alb  3.8  /  TBili  1.3<H>  /  DBili  x   /  AST  40  /  ALT  10  /  AlkPhos  85  08-29  A1C with Estimated Average Glucose Result: 6.1 % (05-16-23 @ 17:09)

## 2023-08-31 NOTE — DIETITIAN INITIAL EVALUATION ADULT - PROBLEM SELECTOR PLAN 1
-Echo 6/22/23   LVEF 29%, RV w/ reduced systolic function, severely dilated b/l atria   -elevated BNP this time, increase in weight by 20lb in 2 weeks  -SBP in low 100s after taking double dose of torsemide at home  -BLE duplex neg for VTE   >will order for lasix IVP 20 and monitor BP closely   >hold home torsemide and metolazone  >hold aldactone  >will decrease toprol to 25 (home dose 50)

## 2023-08-31 NOTE — DIETITIAN INITIAL EVALUATION ADULT - PERSON TAUGHT/METHOD
Reinforced education on heart failure nutrition therapy. Recommended limited salt intake. Reviewed foods high in salt and amount of salt recommended per day. Reviewed recommended foods within therapeutic diet. Discussed the importance of daily weights at home. Discussed weight gain parameters for contacting MD. Provided recommendations for fluid restriction at home. Discussed vitamin K + coumadin interfraction, reviewed foods high in vitamin K./verbal instruction/teach back - (Patient repeats in own words)/patient instructed/mother instructed

## 2023-08-31 NOTE — CONSULT NOTE ADULT - ASSESSMENT
53F with biventricular HF due to congenital cardiomyopathy, afib on coumadin (s/p MV and TV repair), recent hemorrhage after tooth extraction on coumadin, admitted for right heart failure exacerbation with 20lb weight gain and leg swelling over 2 weeks. Cardiology consulted for assistance with HF management and valve evaluation. Does not appear to be in left heart failure given clear lungs and on RA with no respiratory distress.    #Right Heart Failure  - Continue metoprolol and spironolactone   - Would start diuresis with IV lasix 40mg BID and assess UOP response. Increase dose if inadequate response.   - Please monitor I/Os, daily standing weights  - Maintain K>4, Mg>2  - Agree with repeat TTE to evaluate valves   - TSH WNL    #A-fib   - Rate control with metoprolol  - Continue coumadin at 7.5mg daily    Pedro Estrada MD  Cardiology Fellow  All Cardiology service information can be found 24/7 on [email or web address], password: [email or web address]    ***Note not finalized until co-signed by attending***

## 2023-08-31 NOTE — DIETITIAN INITIAL EVALUATION ADULT - PERTINENT MEDS FT
MEDICATIONS  (STANDING):  furosemide   Injectable 40 milliGRAM(s) IV Push every 12 hours  metoprolol succinate ER 50 milliGRAM(s) Oral daily  spironolactone 25 milliGRAM(s) Oral daily  Tirosint (levothyroxine) 175 mcg 1 Capsule(s) 1 Capsule(s) Oral <User Schedule>  warfarin 7.5 milliGRAM(s) Oral once    MEDICATIONS  (PRN):  acetaminophen     Tablet .. 650 milliGRAM(s) Oral every 6 hours PRN Temp greater or equal to 38C (100.4F), Mild Pain (1 - 3)  aluminum hydroxide/magnesium hydroxide/simethicone Suspension 30 milliLiter(s) Oral every 4 hours PRN Dyspepsia  melatonin 3 milliGRAM(s) Oral at bedtime PRN Insomnia  ondansetron Injectable 4 milliGRAM(s) IV Push every 8 hours PRN Nausea and/or Vomiting

## 2023-08-31 NOTE — DIETITIAN INITIAL EVALUATION ADULT - ORAL INTAKE PTA/DIET HISTORY
Pt reports good appetite and PO intake PTA; pt's mother prepares meals for her, reports limited sodium intake/use at home. Pt tries to adhere to fluid restriction as well. Pt/family aware of Coumadin/vitamin K reaction. Pt confirms food allergy to strawberries. Reports taking multivitamin and calcium PTA. No chewing/swallowing difficulty reported.

## 2023-08-31 NOTE — PROGRESS NOTE ADULT - SUBJECTIVE AND OBJECTIVE BOX
no events overnight.    GENERAL: No fevers, no chills.  EYES: No blurry vision,  No photophobia  ENT: No sore throat.  No dysphagia  Cardiovascular: No chest pain, palpitations, orthopnea  Pulmonary: No cough, no wheezing. No shortness of breath  Gastrointestinal: No abdominal pain, no diarrhea, no constipation.    Musculoskeletal: No weakness.  No myalgias.  Dermatology:  No rashes.  Neuro: No Headache.  No vertigo.  No dizziness.  Psych: No anxiety, no depression.  Denies suicidal thoughts.    MEDICATIONS  (STANDING):  furosemide   Injectable 40 milliGRAM(s) IV Push every 12 hours  metoprolol succinate ER 50 milliGRAM(s) Oral daily  potassium phosphate / sodium phosphate Powder (PHOS-NaK) 1 Packet(s) Oral once  spironolactone 25 milliGRAM(s) Oral daily  Tirosint (levothyroxine) 175 mcg 1 Capsule(s) 1 Capsule(s) Oral <User Schedule>  warfarin 7.5 milliGRAM(s) Oral once    MEDICATIONS  (PRN):  acetaminophen     Tablet .. 650 milliGRAM(s) Oral every 6 hours PRN Temp greater or equal to 38C (100.4F), Mild Pain (1 - 3)  aluminum hydroxide/magnesium hydroxide/simethicone Suspension 30 milliLiter(s) Oral every 4 hours PRN Dyspepsia  melatonin 3 milliGRAM(s) Oral at bedtime PRN Insomnia  ondansetron Injectable 4 milliGRAM(s) IV Push every 8 hours PRN Nausea and/or Vomiting    Vital Signs Last 24 Hrs  T(C): 36.7 (31 Aug 2023 04:00), Max: 36.8 (30 Aug 2023 21:03)  T(F): 98 (31 Aug 2023 04:00), Max: 98.2 (30 Aug 2023 21:03)  HR: 94 (31 Aug 2023 04:00) (91 - 98)  BP: 97/62 (31 Aug 2023 04:00) (93/64 - 112/78)  BP(mean): --  RR: 18 (31 Aug 2023 04:00) (18 - 18)  SpO2: 97% (31 Aug 2023 04:00) (94% - 100%)    Parameters below as of 31 Aug 2023 04:00  Patient On (Oxygen Delivery Method): room air    CONSTITUTIONAL: NAD  NECK: Supple, no palpable masses; no thyromegaly  RESPIRATORY: Normal respiratory effort; lungs are clear to auscultation bilaterally  CARDIOVASCULAR: Regular rate and rhythm, normal S1 and S2, no murmur/rub/gallop; b/l 2-3+ symmetric LE pitting edema   ABDOMEN: Nontender to palpation, normoactive bowel sounds  MUSCULOSKELETAL: no clubbing or cyanosis of digits; no joint swelling or tenderness to palpation  PSYCH: A+O to person, place, and time; affect appropriate  SKIN: No rashes; no palpable lesions    .  LABS:                         9.3    5.60  )-----------( 129      ( 30 Aug 2023 07:47 )             29.1     08-30    134<L>  |  95<L>  |  15  ----------------------------<  112<H>  3.4<L>   |  26  |  0.52    Ca    8.6      30 Aug 2023 17:36  Phos  2.0     08-31  Mg     2.1     08-31    TPro  6.7  /  Alb  3.8  /  TBili  1.3<H>  /  DBili  x   /  AST  40  /  ALT  10  /  AlkPhos  85  08-29    PT/INR - ( 31 Aug 2023 05:45 )   PT: 21.2 sec;   INR: 2.06 ratio         PTT - ( 30 Aug 2023 07:47 )  PTT:42.8 sec  Urinalysis Basic - ( 30 Aug 2023 17:36 )    Color: x / Appearance: x / SG: x / pH: x  Gluc: 112 mg/dL / Ketone: x  / Bili: x / Urobili: x   Blood: x / Protein: x / Nitrite: x   Leuk Esterase: x / RBC: x / WBC x   Sq Epi: x / Non Sq Epi: x / Bacteria: x            RADIOLOGY, EKG & ADDITIONAL TESTS: Reviewed.

## 2023-09-01 LAB
ANION GAP SERPL CALC-SCNC: 11 MMOL/L — SIGNIFICANT CHANGE UP (ref 5–17)
BUN SERPL-MCNC: 14 MG/DL — SIGNIFICANT CHANGE UP (ref 7–23)
CALCIUM SERPL-MCNC: 8.4 MG/DL — SIGNIFICANT CHANGE UP (ref 8.4–10.5)
CHLORIDE SERPL-SCNC: 95 MMOL/L — LOW (ref 96–108)
CO2 SERPL-SCNC: 27 MMOL/L — SIGNIFICANT CHANGE UP (ref 22–31)
CREAT SERPL-MCNC: 0.56 MG/DL — SIGNIFICANT CHANGE UP (ref 0.5–1.3)
EGFR: 108 ML/MIN/1.73M2 — SIGNIFICANT CHANGE UP
GLUCOSE SERPL-MCNC: 108 MG/DL — HIGH (ref 70–99)
HCT VFR BLD CALC: 26.1 % — LOW (ref 34.5–45)
HGB BLD-MCNC: 8.6 G/DL — LOW (ref 11.5–15.5)
INR BLD: 1.83 RATIO — HIGH (ref 0.85–1.18)
MAGNESIUM SERPL-MCNC: 1.9 MG/DL — SIGNIFICANT CHANGE UP (ref 1.6–2.6)
MCHC RBC-ENTMCNC: 33 GM/DL — SIGNIFICANT CHANGE UP (ref 32–36)
MCHC RBC-ENTMCNC: 33.5 PG — SIGNIFICANT CHANGE UP (ref 27–34)
MCV RBC AUTO: 101.6 FL — HIGH (ref 80–100)
NRBC # BLD: 0 /100 WBCS — SIGNIFICANT CHANGE UP (ref 0–0)
PLATELET # BLD AUTO: 104 K/UL — LOW (ref 150–400)
POTASSIUM SERPL-MCNC: 3.1 MMOL/L — LOW (ref 3.5–5.3)
POTASSIUM SERPL-SCNC: 3.1 MMOL/L — LOW (ref 3.5–5.3)
PROTHROM AB SERPL-ACNC: 19.8 SEC — HIGH (ref 9.5–13)
RBC # BLD: 2.57 M/UL — LOW (ref 3.8–5.2)
RBC # FLD: 16.4 % — HIGH (ref 10.3–14.5)
SODIUM SERPL-SCNC: 133 MMOL/L — LOW (ref 135–145)
WBC # BLD: 5.12 K/UL — SIGNIFICANT CHANGE UP (ref 3.8–10.5)
WBC # FLD AUTO: 5.12 K/UL — SIGNIFICANT CHANGE UP (ref 3.8–10.5)

## 2023-09-01 PROCEDURE — 99232 SBSQ HOSP IP/OBS MODERATE 35: CPT

## 2023-09-01 PROCEDURE — 99222 1ST HOSP IP/OBS MODERATE 55: CPT | Mod: FS

## 2023-09-01 RX ORDER — FUROSEMIDE 40 MG
60 TABLET ORAL EVERY 12 HOURS
Refills: 0 | Status: DISCONTINUED | OUTPATIENT
Start: 2023-09-01 | End: 2023-09-05

## 2023-09-01 RX ORDER — WARFARIN SODIUM 2.5 MG/1
7.5 TABLET ORAL ONCE
Refills: 0 | Status: COMPLETED | OUTPATIENT
Start: 2023-09-01 | End: 2023-09-01

## 2023-09-01 RX ADMIN — Medication 60 MILLIGRAM(S): at 17:41

## 2023-09-01 RX ADMIN — WARFARIN SODIUM 7.5 MILLIGRAM(S): 2.5 TABLET ORAL at 21:38

## 2023-09-01 RX ADMIN — Medication 50 MILLIGRAM(S): at 05:00

## 2023-09-01 RX ADMIN — Medication 40 MILLIGRAM(S): at 05:00

## 2023-09-01 RX ADMIN — SPIRONOLACTONE 25 MILLIGRAM(S): 25 TABLET, FILM COATED ORAL at 05:00

## 2023-09-01 NOTE — CONSULT NOTE ADULT - ASSESSMENT
Patient has progressive biventriclar failure with right heart failure worse then lleft hear failure despite successful valve in valve tricuspid rplacement with no residual MR. LV and RV function are worse  She is anemic hypokalemic  Despite this she is relatively asymptomatic able to ambulate without signoificant sob  Overalll prognosis is guarded    Recommend  increase lasix 60 BID  replace potassium and magnesium  check iron stores  if does not lose weight sweitch to IV lasix drip or bumex drip  once diuresis accomplished add low dose entresto as BP tolerates

## 2023-09-01 NOTE — CONSULT NOTE ADULT - SUBJECTIVE AND OBJECTIVE BOX
Pedro Estrada MD  Cardiology Fellow  All Cardiology service information can be found 24/7 on [email or web address], password: [email or web address]    Patient seen and evaluated at bedside    Chief Complaint:    HPI:  53F PMHx significant for Afib on Coumadin (7.5mg daily) developmental delay, HTN, Hypothyroid, MV and TV repair '15 @ [place], and TV repair in March '23  @ [facility]. Recently admitted for hemorrhage after tooth extraction 2/2 elevated INR from coumadin, dc'd on 8/18/23.  Pt comes to ED today referred by visiting nurse, who noticed 20lb weight gain in 2 weeks. Pt called his cardiologist, who recommended that she take double dose of her torsemide today, which she did before coming to ED.   On presentation, pt was afebrile, HR 98, /66 and on room air 100% with RR 18.   Labs significant for hgb 9.7 (was 9.1 last discharge; previous baseline in 12-13),  (was 101 at last discharge), plt 155, INR 2.72, Na 127, Cr 0.53, bili 1.3, phos 2.4, trop 11, and pro-BNP 4440 (previous in 3K)   On interview, pt is AAOx4 and in no distress. Denies fever, chills, HA, dizziness, CP, SOB, abd pain, n/v/d. Reports BLE swelling although cannot remember when it started.  (29 Aug 2023 22:23)    Patient was admitted and found to be in R heart failure with clear lungs, no O2 requirement, but elevated JVP and bilateral lower extremity edema/weight gain.     Since admission, repeat TTE ordered to evaluate tricuspid valve. Continued on home GDMT. Diuretic held due to low serum sodium with rapid correction without intervention (127->133)    PMHx:   Chronic atrial fibrillation    Hypothyroid    Hypertension    Severe tricuspid regurgitation    Anemia    Developmental delay        PSHx:   S/P mitral valve replacement    H/O tricuspid valve repair        Allergies:  strawberry (Hives)  Keflex (Unknown)      Home Meds:    Current Medications:   acetaminophen     Tablet .. 650 milliGRAM(s) Oral every 6 hours PRN  aluminum hydroxide/magnesium hydroxide/simethicone Suspension 30 milliLiter(s) Oral every 4 hours PRN  melatonin 3 milliGRAM(s) Oral at bedtime PRN  metoprolol succinate ER 50 milliGRAM(s) Oral daily  ondansetron Injectable 4 milliGRAM(s) IV Push every 8 hours PRN  spironolactone 25 milliGRAM(s) Oral daily  Tirosint (levothyroxine) 175 mcg 1 Capsule(s) 1 Capsule(s) Oral <User Schedule>      FAMILY HISTORY:      Social History:  Smoking History: Denies  Alcohol Use: Denies  Drug Use: Denies    REVIEW OF SYSTEMS:  CONSTITUTIONAL: No weakness, fevers or chills  RESPIRATORY: No cough, wheezing, hemoptysis; No shortness of breath  CARDIOVASCULAR: No chest pain or palpitations; +lower extremity edema and tightness  GASTROINTESTINAL: No abdominal or epigastric pain. No nausea, vomiting, or hematemesis; No diarrhea or constipation. No melena or hematochezia.  All other review of systems is negative unless indicated above.    Physical Exam:  T(F): 98.2 (08-30), Max: 98.2 (08-30)  HR: 94 (08-30) (74 - 98)  BP: 95/63 (08-30) (93/64 - 116/88)  RR: 18 (08-30)  SpO2: 94% (08-30)  GENERAL: No acute distress, well-developed  HEAD:  Atraumatic, Normocephalic  ENT: +proptosis, EOMI, PERRLA, conjunctiva and sclera clear, Neck supple, Elevated JVD to mandible while upright, moist mucosa  CHEST/LUNG: Clear to auscultation bilaterally; No wheeze, equal breath sounds bilaterally   HEART: Irregular rate and rhythm; 4/6 holosystolic and holodiastolic murmurs. 2+ dependent edema throughout lower extremities  ABDOMEN: Soft, Nontender, Nondistended; Bowel sounds present  EXTREMITIES:  Warm, well perfused  PSYCH: Nl behavior, nl affect  NEUROLOGY: cranial nerves intact  SKIN: Normal color, No rashes or lesions    LINES:    Cardiovascular Diagnostic Testing:    ECG: Personally reviewed:  A-fib w RBBB and LAFB, similar to prior. Inverted T waves I, aVL, V1-V2, present prior    Echo: Personally reviewed:  < from: TTE W or WO Ultrasound Enhancing Agent (06.22.23 @ 11:38) >  CONCLUSIONS:      1. Moderately dilated left ventricular cavity size. The left ventricular systolic function is moderately decreased with anejection fraction of 29 % by 3D. There is global left ventricular hypokinesis.   2. Analysis of left ventricular diastolic function and filling pressure is made challenging by the presence of mitral annuloplasty ring.   3. Severely enlarged right ventricular cavity size and moderately reduced systolic function.   4. The left atrium is severely dilated.   5. The right atrium is severely dilated.   6. An annuloplasty ring is noted in the mitral position. There is moderate intravalvular regurgitation.   7. Transcatheter heart valve inside surgical bioprosthesis in the tricuspid position. Well seated tricuspid prosthesis with normal function. Prosthetic tricuspid valve leaflets are not well visualized. Trans-tricuspid gradients (peak gradient of 11.0 mmHg)(mean gradient of 3.0 mmHg) at a heart rate of 92 bpm are normal. No intravalvular regurgitation of the tricuspid valve prosthesis. No paravalvular regurgitation.   8. No pericardial effusion seen.   9. Compared to the transthoracic echocardiogram performed on 5/20/2023 LVEF has decreased. There is moderate mitral regurgitation. The tricuspid valve appears normal in function.    < end of copied text >    Tele: A-fib 80s-100s w PVCs    Stress Testing:    Cath:    Imaging:    CXR: Personally reviewed    Labs: Personally reviewed                        9.3    5.60  )-----------( 129      ( 30 Aug 2023 07:47 )             29.1     08-30    134<L>  |  95<L>  |  15  ----------------------------<  112<H>  3.4<L>   |  26  |  0.52    Ca    8.6      30 Aug 2023 17:36  Phos  2.4     08-29  Mg     1.9     08-30    TPro  6.7  /  Alb  3.8  /  TBili  1.3<H>  /  DBili  x   /  AST  40  /  ALT  10  /  AlkPhos  85  08-29    PT/INR - ( 30 Aug 2023 07:47 )   PT: 26.8 sec;   INR: 2.63 ratio         PTT - ( 30 Aug 2023 07:47 )  PTT:42.8 sec    CARDIAC MARKERS ( 29 Aug 2023 18:57 )  11 ng/L / x     / x     / x     / x     / x        Thyroid Stimulating Hormone, Serum: 2.88 uIU/mL (08-30 @ 07:56)    
Subjective: Patient seen and examined. No new events except as noted.   Patient is well known to me I was away when she was admitted  progressive edema and weihgt gain progressive right heart failure worsening LV function and <MR porbable related to fluid overload  Patient has been off ACE unloading agents becaus BP soft  MEDICATIONS:  MEDICATIONS  (STANDING):  furosemide   Injectable 60 milliGRAM(s) IV Push every 12 hours  metoprolol succinate ER 50 milliGRAM(s) Oral daily  spironolactone 25 milliGRAM(s) Oral daily  Tirosint (levothyroxine) 175 mcg 1 Capsule(s) 1 Capsule(s) Oral <User Schedule>  warfarin 7.5 milliGRAM(s) Oral once      PHYSICAL EXAM:  T(C): 36.3 (09-01-23 @ 11:30), Max: 37 (09-01-23 @ 04:51)  HR: 84 (09-01-23 @ 11:30) (84 - 92)  BP: 103/66 (09-01-23 @ 11:30) (92/57 - 103/66)  RR: 18 (09-01-23 @ 11:30) (18 - 18)  SpO2: 100% (09-01-23 @ 11:30) (96% - 100%)  Wt(kg): --  I&O's Summary    31 Aug 2023 07:01  -  01 Sep 2023 07:00  --------------------------------------------------------  IN: 780 mL / OUT: 0 mL / NET: 780 mL    01 Sep 2023 07:01  -  01 Sep 2023 17:11  --------------------------------------------------------  IN: 240 mL / OUT: 0 mL / NET: 240 mL          Appearance: Normal	awake alert NAD lying at 30 degrees  HEENT:   Normal oral mucosa, PERRL, EOMI	  Cardiovascular: Normal S1 S2, 2/6 murmur lsb  severe JVD,   Respiratory: Lungs clear to auscultation, normal effort 	  Gastrointestinal:  Soft, Non-tender, + BS	  Skin: No rashes, No ecchymoses, No cyanosis, warm to touch  Musculoskeletal: Normal range of motion, normal strength  Psychiatry:  Mood & affect appropriate  Ext: 2-3+ edema        LABS:    CARDIAC MARKERS:                                8.6    5.12  )-----------( 104      ( 01 Sep 2023 06:25 )             26.1     09-01    133<L>  |  95<L>  |  14  ----------------------------<  108<H>  3.1<L>   |  27  |  0.56    Ca    8.4      01 Sep 2023 06:25  Phos  2.0     08-31  Mg     1.9     09-01      proBNP:   Lipid Profile:   HgA1c:   TSH:           TELEMETRY: 	    ECG:  afib RBBB	  RADIOLOGY:   DIAGNOSTIC TESTING:  < from: TTE W or WO Ultrasound Enhancing Agent (08.31.23 @ 10:35) >  CONCLUSIONS:      1. Moderately dilated left ventricular cavity size. Left ventricular wall thickness is normal. Left ventricular systolic function is severely decreased with an ejection fraction of 20 % by Ramon's method of disks. There is global left ventricular hypokinesis.   2. Severely enlarged right ventricular cavity size, normal right ventricular wall thickness and normal right ventricular systolic function.   3. An annuloplasty ring is noted in the mitral position.   4. Severe mitral regurgitation.   5. Percutaneous tricuspid valve in previous surgical valve.   6. Pulmonic valve was not well visualized.    ________________________________________________________________________________________  FINDINGS:     Left Ventricle:  Moderately dilated left ventricular cavity size. Left ventricular wall thickness is normal. Left ventricular systolic function is severely decreased with a calculated ejection fraction of 20% by the Ramon's biplane method of disks. There is global left ventricular hypokinesis. Elevated filling pressure. There is increased LV mass and eccentric hypertrophy.     Right Ventricle:  Severely enlarged right ventricular cavity size, normal wall thickness and normal right ventricular systolic function.     Left Atrium:  The left atrium is severely dilated in size with an indexed volume of 102.56 ml/m².     Right Atrium:  The right atrium is severely dilated in size.     Aortic Valve:  Theaortic valve is tricuspid with normal structure without stenosis.     Mitral Valve:  An annuloplasty ring is noted in the mitral position. There is severe mitral regurgitation.     Tricuspid Valve:  There is trace tricuspid regurgitation.     Pulmonic Valve:  The pulmonic valve was not well visualized. Structurally normal pulmonic valve with normal leaflet excursion. There is mild pulmonic regurgitation.     Aorta:  The aortic annulus and aortic root appear normal in size.     Pericardium:  No pericardial effusion seen.     Systemic Veins:  The inferior vena cava is dilated measuring 2.50 cm in diameter, (dilated >2.1cm) with abnormal inspiratory collapse (abnormal <50%) consistent with elevated right atrial pressure (~15, range 10-20mmHg).  ____________________________________________________________________  Quantitative Data:  Left Ventricle Measurements: (Indexed to BSA)     IVSd (2D):   0.8 cm  LVPWd (2D):  0.8 cm  LVIDd (2D):  5.6 cm  LVIDs (2D):  5.1 cm  LV Mass:     165 g  99.0 g/m²  BiPlane LV EF%: 20 %     e' lateral: 6.73 cm/s  e' medial:  3.16 cm/s    Aorta Measurements: (normal range) (Indexed to BSA)     Sinuses of Valsalva: 2.80 cm (2.7 - 3.3 cm)cm  Ao Arch:             2.7 cm       Left Atrium Measurements: (Indexed toBSA)  LA Diam 2D: 7.20 cm    Right Ventricle Measurements:     TV [place]. S':      3.00 cm/s  RV Base (RVID1): 5.7 cm  RV Mid (RVID2):  6.1 cm       LVOT / RVOT/ Qp/Qs Data: (Indexed to BSA)  LVOT Diameter: 1.60 cm  LVOT Vmax:     0.49 m/s  LVOT VTI:  7.23 cm  LVOT SV:       14.5 ml  8.72 ml/m²    Mitral Valve Measurements:     MV Vmax:      2.22 m/s     MR Vmax:          3.94 m/s  MV Mean Grad: 9.00 mmHg    MR VTI:           103.67 cm  MV Peak Grad: 19.7 mmHg    MR Mean Gradient: 42.0 mmHg                         MR Peak Gradient: 62.1 mmHg          Tricuspid Valve Measurements:     RA Pressure: 15 mmHg    ________________________________________________________________________________________  Electronically signed on 8/31/2023 at 6:35:01 PM by Yasir DamonMD         < from: TTE W or WO Ultrasound Enhancing Agent (06.22.23 @ 11:38) >  CONCLUSIONS:      1. Moderately dilated left ventricular cavity size. The left ventricular systolic function is moderately decreased with anejection fraction of 29 % by 3D. There is global left ventricular hypokinesis.   2. Analysis of left ventricular diastolic function and filling pressure is made challenging by the presence of mitral annuloplasty ring.   3. Severely enlarged right ventricular cavity size and moderately reduced systolic function.   4. The left atrium is severely dilated.   5. The right atrium is severely dilated.   6. An annuloplasty ring is noted in the mitral position. There is moderate intravalvular regurgitation.   7. Transcatheter heart valve inside surgical bioprosthesis in the tricuspid position. Well seated tricuspid prosthesis with normal function. Prosthetic tricuspid valve leaflets are not well visualized. Trans-tricuspid gradients (peak gradient of 11.0 mmHg)(mean gradient of 3.0 mmHg) at a heart rate of 92 bpm are normal. No intravalvular regurgitation of the tricuspid valve prosthesis. No paravalvular regurgitation.   8. No pericardial effusion seen.   9. Compared to the transthoracic echocardiogram performed on 5/20/2023 LVEF has decreased. There is moderate mitral regurgitation. The tricuspid valve appears normal in function.      < end of copied text >  < from: TTE W or WO Ultrasound Enhancing Agent (06.22.23 @ 11:38) >  CONCLUSIONS:      1. Moderately dilated left ventricular cavity size. The left ventricular systolic function is moderately decreased with anejection fraction of 29 % by 3D. There is global left ventricular hypokinesis.   2. Analysis of left ventricular diastolic function and filling pressure is made challenging by the presence of mitral annuloplasty ring.   3. Severely enlarged right ventricular cavity size and moderately reduced systolic function.   4. The left atrium is severely dilated.   5. The right atrium is severely dilated.   6. An annuloplasty ring is noted in the mitral position. There is moderate intravalvular regurgitation.   7. Transcatheter heart valve inside surgical bioprosthesis in the tricuspid position. Well seated tricuspid prosthesis with normal function. Prosthetic tricuspid valve leaflets are not well visualized. Trans-tricuspid gradients (peak gradient of 11.0 mmHg)(mean gradient of 3.0 mmHg) at a heart rate of 92 bpm are normal. No intravalvular regurgitation of the tricuspid valve prosthesis. No paravalvular regurgitation.   8. No pericardial effusion seen.   9. Compared to the transthoracic echocardiogram performed on 5/20/2023 LVEF has decreased. There is moderate mitral regurgitation. The tricuspid valve appears normal in function.      r< from: Xray Chest 1 View- PORTABLE-Urgent (08.29.23 @ 19:44) >  FINDINGS:  Sternotomy wires.  Cardiomegaly. Valve replacement.  The lungs are clear.  There is no pneumothorax or pleural effusion.  No acute bony abnormality.    IMPRESSION:  Clear lungs.    --- End of Report ---      < end of copied text >

## 2023-09-01 NOTE — CHART NOTE - NSCHARTNOTEFT_GEN_A_CORE
Patient is a 53F with PMH Afib on Coumadin, developmental delay, HTN, Anemia, Graves DX, Hypothyroid, Anemia , MV repair  TV replacement 2015 @ [place] who underwent a tricuspid valve in valve replacement (29mm Bhavana 3 Ultra Resilia valve) with Dr. Farmer and Dr. Hess on 5/19/23.     Notified by Dr. Alden Gale of her admission for CHF exacerbation. TTE reviewed with Dr. Dayana Gale and Dr. Hess. She has biventricular enlargement with biventricular dysfunction. There is moderate MR that is stable and unchanged from her prior TTE. The tricuspid Ellie is functioning appropriately with no tricuspid regurgitation. Her IVC is dilated consistent with presentation of volume overload and acute on chronic CHF exacerbation.      No structural intervention warranted at this time.     ELENITA Cheek NP  [id number]

## 2023-09-01 NOTE — PROGRESS NOTE ADULT - SUBJECTIVE AND OBJECTIVE BOX
no events overnight.    GENERAL: No fevers, no chills.  EYES: No blurry vision,  No photophobia  ENT: No sore throat.  No dysphagia  Cardiovascular: No chest pain, palpitations, orthopnea  Pulmonary: No cough, no wheezing. No shortness of breath  Gastrointestinal: No abdominal pain, no diarrhea, no constipation.    Musculoskeletal: No weakness.  No myalgias.  Dermatology:  No rashes.  Neuro: No Headache.  No vertigo.  No dizziness.  Psych: No anxiety, no depression.  Denies suicidal thoughts.    MEDICATIONS  (STANDING):  furosemide   Injectable 40 milliGRAM(s) IV Push every 12 hours  metoprolol succinate ER 50 milliGRAM(s) Oral daily  spironolactone 25 milliGRAM(s) Oral daily  Tirosint (levothyroxine) 175 mcg 1 Capsule(s) 1 Capsule(s) Oral <User Schedule>  warfarin 7.5 milliGRAM(s) Oral once    MEDICATIONS  (PRN):  acetaminophen     Tablet .. 650 milliGRAM(s) Oral every 6 hours PRN Temp greater or equal to 38C (100.4F), Mild Pain (1 - 3)  aluminum hydroxide/magnesium hydroxide/simethicone Suspension 30 milliLiter(s) Oral every 4 hours PRN Dyspepsia  melatonin 3 milliGRAM(s) Oral at bedtime PRN Insomnia  ondansetron Injectable 4 milliGRAM(s) IV Push every 8 hours PRN Nausea and/or Vomiting    Vital Signs Last 24 Hrs  T(C): 36.7 (31 Aug 2023 04:00), Max: 36.8 (30 Aug 2023 21:03)  T(F): 98 (31 Aug 2023 04:00), Max: 98.2 (30 Aug 2023 21:03)  HR: 94 (31 Aug 2023 04:00) (91 - 98)  BP: 97/62 (31 Aug 2023 04:00) (93/64 - 112/78)  BP(mean): --  RR: 18 (31 Aug 2023 04:00) (18 - 18)  SpO2: 97% (31 Aug 2023 04:00) (94% - 100%)    Parameters below as of 31 Aug 2023 04:00  Patient On (Oxygen Delivery Method): room air    CONSTITUTIONAL: NAD  NECK: Supple, no palpable masses; no thyromegaly  RESPIRATORY: Normal respiratory effort; lungs are clear to auscultation bilaterally  CARDIOVASCULAR: Regular rate and rhythm, normal S1 and S2, no murmur/rub/gallop; b/l 2-3+ symmetric LE pitting edema   ABDOMEN: Nontender to palpation, normoactive bowel sounds  MUSCULOSKELETAL: no clubbing or cyanosis of digits; no joint swelling or tenderness to palpation  PSYCH: A+O to person, place, and time; affect appropriate  SKIN: No rashes; no palpable lesions    .  LABS:                         8.6    5.12  )-----------( 104      ( 01 Sep 2023 06:25 )             26.1     09-01    133<L>  |  95<L>  |  14  ----------------------------<  108<H>  3.1<L>   |  27  |  0.56    Ca    8.4      01 Sep 2023 06:25  Phos  2.0     08-31  Mg     1.9     09-01      PT/INR - ( 01 Sep 2023 06:24 )   PT: 19.8 sec;   INR: 1.83 ratio           Urinalysis Basic - ( 01 Sep 2023 06:25 )    Color: x / Appearance: x / SG: x / pH: x  Gluc: 108 mg/dL / Ketone: x  / Bili: x / Urobili: x   Blood: x / Protein: x / Nitrite: x   Leuk Esterase: x / RBC: x / WBC x   Sq Epi: x / Non Sq Epi: x / Bacteria: x            RADIOLOGY, EKG & ADDITIONAL TESTS: Reviewed.

## 2023-09-01 NOTE — PROGRESS NOTE ADULT - ASSESSMENT
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 Cardiology consulted for assistance with diuresis    1. Right sided volume overload  2. afib     Recs  -Continue with lasix 40mg IV BID  -c/w spironolactone  -strict I/o's and daily weights.  -MG 2-3, K 4-5  - Continue metoprolol, rates acceptable  -c/w coumadin  - Would start diuresis with IV lasix 40mg BID and assess UOP response. Increase dose if inadequate response.   - Please monitor I/Os, daily standing weights  - Maintain K>4, Mg>2  - TSH WNL  -TTE Moderately dilated left ventricular cavity size. Left ventricular wall thickness is normal. Left ventricular systolic function is severely decreased with an ejection fraction of 20 % by Ramon's method of disks. There is global left ventricular hypokinesis. Severely enlarged right ventricular cavity size, normal right ventricular wall thickness and normal right ventricular systolic function. An annuloplasty ring is noted in the mitral position.     Rishi Bruner, Cardiology Fellow, F-2    For all New Consults and Questions:  [email or web address]   Login: [email or web address]    *** Note not final until signed by attending

## 2023-09-01 NOTE — PROGRESS NOTE ADULT - SUBJECTIVE AND OBJECTIVE BOX
Patient seen and examined at bedside.    Overnight Events: No acute events overnight. Denies chest pain or SOB      REVIEW OF SYSTEMS:  Constitutional:     [ x] negative [ ] fevers [ ] chills [ ] weight loss [ ] weight gain  HEENT:                  [ x] negative [ ] dry eyes [ ] eye irritation [ ] postnasal drip [ ] nasal congestion  CV:                         [x ] negative  [ ] chest pain [ ] orthopnea [ ] palpitations [ ] murmur  Resp:                     [ x] negative [ ] cough [ ] shortness of breath [ ] dyspnea [ ] wheezing [ ] sputum [ ]hemoptysis  GI:                          [ x] negative [ ] nausea [ ] vomiting [ ] diarrhea [ ] constipation [ ] abd pain [ ] dysphagia   :                        [ x] negative [ ] dysuria [ ] nocturia [ ] hematuria [ ] increased urinary frequency  Musculoskeletal: [ x] negative [ ] back pain [ ] myalgias [ ] arthralgias [ ] fracture  Skin:                       [ x] negative [ ] rash [ ] itch  Neurological:        [ x] negative [ ] headache [ ] dizziness [ ] syncope [ ] weakness [ ] numbness  Psychiatric:           [ x] negative [ ] anxiety [ ] depression  Endocrine:            [ x] negative [ ] diabetes [ ] thyroid problem  Heme/Lymph:      [ x] negative [ ] anemia [ ] bleeding problem  Allergic/Immune: [x ] negative [ ] itchy eyes [ ] nasal discharge [ ] hives [ ] angioedema    [x ] All other systems negative  [ ] Unable to assess ROS due to    Current Meds:  acetaminophen     Tablet .. 650 milliGRAM(s) Oral every 6 hours PRN  aluminum hydroxide/magnesium hydroxide/simethicone Suspension 30 milliLiter(s) Oral every 4 hours PRN  furosemide   Injectable 40 milliGRAM(s) IV Push every 12 hours  melatonin 3 milliGRAM(s) Oral at bedtime PRN  metoprolol succinate ER 50 milliGRAM(s) Oral daily  ondansetron Injectable 4 milliGRAM(s) IV Push every 8 hours PRN  spironolactone 25 milliGRAM(s) Oral daily  Tirosint (levothyroxine) 175 mcg 1 Capsule(s) 1 Capsule(s) Oral <User Schedule>      PAST MEDICAL & SURGICAL HISTORY:  Chronic atrial fibrillation      Hypothyroid      Hypertension      Severe tricuspid regurgitation      Anemia      Developmental delay      S/P mitral valve replacement      H/O tricuspid valve repair          Vitals:  T(F): 98.6 (09-01), Max: 98.6 (09-01)  HR: 92 (09-01) (85 - 92)  BP: 92/57 (09-01) (92/57 - 92/61)  RR: 18 (09-01)  SpO2: 98% (09-01)  I&O's Summary    30 Aug 2023 07:01  -  31 Aug 2023 07:00  --------------------------------------------------------  IN: 420 mL / OUT: 0 mL / NET: 420 mL    31 Aug 2023 07:01  -  01 Sep 2023 06:39  --------------------------------------------------------  IN: 780 mL / OUT: 0 mL / NET: 780 mL        Physical Exam:  GENERAL: No acute distress, well-developed  HEAD:  Atraumatic, Normocephalic  ENT: +proptosis, EOMI, PERRLA, conjunctiva and sclera clear, Neck supple, Elevated JVD to mandible while upright, moist mucosa  CHEST/LUNG: Clear to auscultation bilaterally; No wheeze, equal breath sounds bilaterally   HEART: Irregular rate and rhythm; 4/6 holosystolic and holodiastolic murmurs. 2+ dependent edema throughout lower extremities  ABDOMEN: Soft, Nontender, Nondistended; Bowel sounds present  EXTREMITIES:  Warm, well perfused  PSYCH: Nl behavior, nl affect, parents at bedside.  NEUROLOGY: cranial nerves intact  SKIN: Normal color, No rashes or lesions                          8.6    5.12  )-----------( 104      ( 01 Sep 2023 06:25 )             26.1     08-31    132<L>  |  95<L>  |  15  ----------------------------<  152<H>  3.9   |  27  |  0.56    Ca    8.8      31 Aug 2023 14:01  Phos  2.0     08-31  Mg     2.1     08-31      PT/INR - ( 31 Aug 2023 05:45 )   PT: 21.2 sec;   INR: 2.06 ratio         PTT - ( 30 Aug 2023 07:47 )  PTT:42.8 sec                 Patient seen and examined at bedside.    Overnight Events: No acute events overnight. Denies chest pain or SOB, or palpitations.   Tele: afib 's      REVIEW OF SYSTEMS:  Constitutional:     [ x] negative [ ] fevers [ ] chills [ ] weight loss [ ] weight gain  HEENT:                  [ x] negative [ ] dry eyes [ ] eye irritation [ ] postnasal drip [ ] nasal congestion  CV:                         [x ] negative  [ ] chest pain [ ] orthopnea [ ] palpitations [ ] murmur  Resp:                     [ x] negative [ ] cough [ ] shortness of breath [ ] dyspnea [ ] wheezing [ ] sputum [ ]hemoptysis  GI:                          [ x] negative [ ] nausea [ ] vomiting [ ] diarrhea [ ] constipation [ ] abd pain [ ] dysphagia   :                        [ x] negative [ ] dysuria [ ] nocturia [ ] hematuria [ ] increased urinary frequency  Musculoskeletal: [ x] negative [ ] back pain [ ] myalgias [ ] arthralgias [ ] fracture  Skin:                       [ x] negative [ ] rash [ ] itch  Neurological:        [ x] negative [ ] headache [ ] dizziness [ ] syncope [ ] weakness [ ] numbness  Psychiatric:           [ x] negative [ ] anxiety [ ] depression  Endocrine:            [ x] negative [ ] diabetes [ ] thyroid problem  Heme/Lymph:      [ x] negative [ ] anemia [ ] bleeding problem  Allergic/Immune: [x ] negative [ ] itchy eyes [ ] nasal discharge [ ] hives [ ] angioedema    [x ] All other systems negative  [ ] Unable to assess ROS due to    Current Meds:  acetaminophen     Tablet .. 650 milliGRAM(s) Oral every 6 hours PRN  aluminum hydroxide/magnesium hydroxide/simethicone Suspension 30 milliLiter(s) Oral every 4 hours PRN  furosemide   Injectable 40 milliGRAM(s) IV Push every 12 hours  melatonin 3 milliGRAM(s) Oral at bedtime PRN  metoprolol succinate ER 50 milliGRAM(s) Oral daily  ondansetron Injectable 4 milliGRAM(s) IV Push every 8 hours PRN  spironolactone 25 milliGRAM(s) Oral daily  Tirosint (levothyroxine) 175 mcg 1 Capsule(s) 1 Capsule(s) Oral <User Schedule>      PAST MEDICAL & SURGICAL HISTORY:  Chronic atrial fibrillation      Hypothyroid      Hypertension      Severe tricuspid regurgitation      Anemia      Developmental delay      S/P mitral valve replacement      H/O tricuspid valve repair          Vitals:  T(F): 98.6 (09-01), Max: 98.6 (09-01)  HR: 92 (09-01) (85 - 92)  BP: 92/57 (09-01) (92/57 - 92/61)  RR: 18 (09-01)  SpO2: 98% (09-01)  I&O's Summary    30 Aug 2023 07:01  -  31 Aug 2023 07:00  --------------------------------------------------------  IN: 420 mL / OUT: 0 mL / NET: 420 mL    31 Aug 2023 07:01  -  01 Sep 2023 06:39  --------------------------------------------------------  IN: 780 mL / OUT: 0 mL / NET: 780 mL        Physical Exam:  GENERAL: No acute distress, well-developed  HEAD:  Atraumatic, Normocephalic  ENT: , EOMI, PERRLA, conjunctiva and sclera clear, Neck supple,   CHEST/LUNG: Clear to auscultation bilaterally; No wheeze, equal breath sounds bilaterally   HEART: Irregular rate and rhythm; 4/6 holosystolic and holodiastolic murmurs. 2+ dependent edema throughout lower extremities  ABDOMEN: Soft, Nontender, Nondistended; Bowel sounds present  EXTREMITIES:  Warm, well perfused  PSYCH: Nl behavior, nl affect, parents at bedside.  NEUROLOGY: cranial nerves intact  SKIN: Normal color, No rashes or lesions                          8.6    5.12  )-----------( 104      ( 01 Sep 2023 06:25 )             26.1     08-31    132<L>  |  95<L>  |  15  ----------------------------<  152<H>  3.9   |  27  |  0.56    Ca    8.8      31 Aug 2023 14:01  Phos  2.0     08-31  Mg     2.1     08-31      PT/INR - ( 31 Aug 2023 05:45 )   PT: 21.2 sec;   INR: 2.06 ratio         PTT - ( 30 Aug 2023 07:47 )  PTT:42.8 sec

## 2023-09-02 LAB
ANION GAP SERPL CALC-SCNC: 12 MMOL/L — SIGNIFICANT CHANGE UP (ref 5–17)
APTT BLD: 38.5 SEC — HIGH (ref 24.5–35.6)
BUN SERPL-MCNC: 13 MG/DL — SIGNIFICANT CHANGE UP (ref 7–23)
CALCIUM SERPL-MCNC: 8.7 MG/DL — SIGNIFICANT CHANGE UP (ref 8.4–10.5)
CHLORIDE SERPL-SCNC: 92 MMOL/L — LOW (ref 96–108)
CO2 SERPL-SCNC: 30 MMOL/L — SIGNIFICANT CHANGE UP (ref 22–31)
CREAT SERPL-MCNC: 0.63 MG/DL — SIGNIFICANT CHANGE UP (ref 0.5–1.3)
EGFR: 105 ML/MIN/1.73M2 — SIGNIFICANT CHANGE UP
GLUCOSE SERPL-MCNC: 101 MG/DL — HIGH (ref 70–99)
HCT VFR BLD CALC: 29 % — LOW (ref 34.5–45)
HGB BLD-MCNC: 9.4 G/DL — LOW (ref 11.5–15.5)
INR BLD: 1.94 RATIO — HIGH (ref 0.85–1.18)
MCHC RBC-ENTMCNC: 32.4 GM/DL — SIGNIFICANT CHANGE UP (ref 32–36)
MCHC RBC-ENTMCNC: 33.6 PG — SIGNIFICANT CHANGE UP (ref 27–34)
MCV RBC AUTO: 103.6 FL — HIGH (ref 80–100)
NRBC # BLD: 0 /100 WBCS — SIGNIFICANT CHANGE UP (ref 0–0)
PLATELET # BLD AUTO: 122 K/UL — LOW (ref 150–400)
POTASSIUM SERPL-MCNC: 3.3 MMOL/L — LOW (ref 3.5–5.3)
POTASSIUM SERPL-SCNC: 3.3 MMOL/L — LOW (ref 3.5–5.3)
PROTHROM AB SERPL-ACNC: 20 SEC — HIGH (ref 9.5–13)
RBC # BLD: 2.8 M/UL — LOW (ref 3.8–5.2)
RBC # FLD: 16.4 % — HIGH (ref 10.3–14.5)
SODIUM SERPL-SCNC: 134 MMOL/L — LOW (ref 135–145)
WBC # BLD: 4.48 K/UL — SIGNIFICANT CHANGE UP (ref 3.8–10.5)
WBC # FLD AUTO: 4.48 K/UL — SIGNIFICANT CHANGE UP (ref 3.8–10.5)

## 2023-09-02 PROCEDURE — 99233 SBSQ HOSP IP/OBS HIGH 50: CPT | Mod: GC

## 2023-09-02 PROCEDURE — 99232 SBSQ HOSP IP/OBS MODERATE 35: CPT

## 2023-09-02 RX ORDER — POTASSIUM CHLORIDE 20 MEQ
40 PACKET (EA) ORAL ONCE
Refills: 0 | Status: COMPLETED | OUTPATIENT
Start: 2023-09-02 | End: 2023-09-02

## 2023-09-02 RX ORDER — WARFARIN SODIUM 2.5 MG/1
7.5 TABLET ORAL ONCE
Refills: 0 | Status: COMPLETED | OUTPATIENT
Start: 2023-09-02 | End: 2023-09-02

## 2023-09-02 RX ADMIN — SPIRONOLACTONE 25 MILLIGRAM(S): 25 TABLET, FILM COATED ORAL at 05:11

## 2023-09-02 RX ADMIN — Medication 60 MILLIGRAM(S): at 05:11

## 2023-09-02 RX ADMIN — Medication 50 MILLIGRAM(S): at 05:11

## 2023-09-02 RX ADMIN — WARFARIN SODIUM 7.5 MILLIGRAM(S): 2.5 TABLET ORAL at 21:36

## 2023-09-02 RX ADMIN — Medication 40 MILLIEQUIVALENT(S): at 09:15

## 2023-09-02 RX ADMIN — Medication 60 MILLIGRAM(S): at 17:23

## 2023-09-02 NOTE — PROGRESS NOTE ADULT - SUBJECTIVE AND OBJECTIVE BOX
Overnight Events: None     Review Of Systems: No chest pain, shortness of breath, or palpitations            Current Meds:  acetaminophen     Tablet .. 650 milliGRAM(s) Oral every 6 hours PRN  aluminum hydroxide/magnesium hydroxide/simethicone Suspension 30 milliLiter(s) Oral every 4 hours PRN  furosemide   Injectable 60 milliGRAM(s) IV Push every 12 hours  melatonin 3 milliGRAM(s) Oral at bedtime PRN  metoprolol succinate ER 50 milliGRAM(s) Oral daily  ondansetron Injectable 4 milliGRAM(s) IV Push every 8 hours PRN  spironolactone 25 milliGRAM(s) Oral daily  Tirosint (levothyroxine) 175 mcg 1 Capsule(s) 1 Capsule(s) Oral <User Schedule>      Vitals:  T(F): 97.4 (09-02), Max: 98.5 (09-01)  HR: 84 (09-02) (84 - 95)  BP: 107/71 (09-02) (97/66 - 107/71)  RR: 18 (09-02)  SpO2: 99% (09-02)  I&O's Summary    01 Sep 2023 07:01  -  02 Sep 2023 07:00  --------------------------------------------------------  IN: 840 mL / OUT: 0 mL / NET: 840 mL        Physical Exam:    Appearance: No acute distress; fatigued appearing   Eyes: pink conjunctiva  HEENT: AT/NC   Cardiovascular: regular rate, irregular rhythm, S1, S2, systolic murmur left sternal border, rubs, or gallops; 2+ edema; + JVD  Respiratory: Clear to auscultation bilaterally  Gastrointestinal: soft, non-tender, non-distended   Musculoskeletal: No clubbing; no joint deformity   Neurologic: Normal speech, no facial asymmetry  Psychiatry: AAOx3, mood & affect appropriate  Skin: No rashes, ecchymoses, or cyanosis of exposed skin                         9.4    4.48  )-----------( 122      ( 02 Sep 2023 07:07 )             29.0     09-01    133<L>  |  95<L>  |  14  ----------------------------<  108<H>  3.1<L>   |  27  |  0.56    Ca    8.4      01 Sep 2023 06:25  Mg     1.9     09-01      PT/INR - ( 02 Sep 2023 07:08 )   PT: 20.0 sec;   INR: 1.94 ratio         PTT - ( 02 Sep 2023 07:08 )  PTT:38.5 sec  CARDIAC MARKERS ( 29 Aug 2023 18:57 )  11 ng/L / x     / x     / x     / x     / x                Interpretation of Telemetry: AF HR

## 2023-09-02 NOTE — PROGRESS NOTE ADULT - ASSESSMENT
The Patient is a 53F with PMH Afib on Coumadin, developmental delay, HTN, Anemia, Graves DX, Hypothyroid, Anemia , MV repair  TV replacement 2015 who underwent a tricuspid valve in valve replacement (29mm Bhavana 3 Ultra Resilia valve) with Dr. Farmer and Dr. Hess on 5/19/23 now admitted for a HFrEF exacerbation. Followed by Dr. Gale.     Impression and recommendations:   - TTE 8/31/23:  Moderately dilated left ventricular cavity size. Left ventricular wall thickness is normal. Left ventricular systolic function is severely decreased with an ejection fraction of 20 %. There is global left ventricular hypokinesis. Severely enlarged right ventricular cavity size, normal right ventricular wall thickness and normal right ventricular systolic function.   An annuloplasty ring is noted in the mitral position. Severe mitral regurgitation. Percutaneous tricuspid valve in previous surgical valve.  - Per Structural: TTE reviewed with Dr. Dayana Gale and Dr. Hess. Pt has biventricular enlargement with biventricular dysfunction. There is moderate MR that is stable and unchanged from her prior TTE. The tricuspid Ellie is functioning appropriately with no tricuspid regurgitation. Her IVC is dilated consistent with presentation of volume overload and acute on chronic CHF exacerbation. No structural intervention warranted at this time.   - Only 840ml of output documented; weight has decreased from 148.3 to 145.2lbs   - Strict I/Os and weights   - Continue IV lasix 60 BID  - Continue Metoprolol succinate 50mg, Spironolactone 25mg daily, Warfarin   - No labs from this morning; please replete potassium and magnesium and monitor levels, K >4, Mg >2   - Iron studies and workup for anemia   - Per Dr. Gale, will consider addition of Marissa Jackson MD  Cardiology Fellow     Recommendations are preliminary until cosigned by attending

## 2023-09-02 NOTE — PROGRESS NOTE ADULT - ATTENDING COMMENTS
Agree with fellow's assessment.   Weight 65Kg from 69 Kg at admission.   Continue IV diuresis
1. ADHF  2. Afib w/ RVR   3. 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     Plan:   Echo consistent with increased filling pressures, and Severe TR and Severe MR   Will continue IV diuresis - goal net negative 1-1.5 liters/24 hours. Please keep strict I/O and daily standing weights.   Titrate metoprolol as needed to achieve rate control. On Coumadin.  Repeat Echo to reassess TR and MR once euvolemic- and will consider Structural Team re-evaluation

## 2023-09-02 NOTE — PROGRESS NOTE ADULT - SUBJECTIVE AND OBJECTIVE BOX
[facility] Division of Hospital Medicine  Jaskaran Murillo MD  Available via MS Teams    PROGRESS NOTE:     Patient is a 53y old  Female who presents with a chief complaint of fluid overload (02 Sep 2023 08:28)    SUBJECTIVE / OVERNIGHT EVENTS:  No acute events overnight. Patient seen and evaluated at bedside.   In good spirits. SOB improved but has persistent lower extremity edema.   No fever/chills.  Denies SOB at rest, chest pain, palpitations, abdominal pain, nausea/vomiting    MEDICATIONS  (STANDING):  furosemide   Injectable 60 milliGRAM(s) IV Push every 12 hours  metoprolol succinate ER 50 milliGRAM(s) Oral daily  spironolactone 25 milliGRAM(s) Oral daily  Tirosint (levothyroxine) 175 mcg 1 Capsule(s) 1 Capsule(s) Oral <User Schedule>  warfarin 7.5 milliGRAM(s) Oral once    MEDICATIONS  (PRN):  acetaminophen     Tablet .. 650 milliGRAM(s) Oral every 6 hours PRN Temp greater or equal to 38C (100.4F), Mild Pain (1 - 3)  aluminum hydroxide/magnesium hydroxide/simethicone Suspension 30 milliLiter(s) Oral every 4 hours PRN Dyspepsia  melatonin 3 milliGRAM(s) Oral at bedtime PRN Insomnia  ondansetron Injectable 4 milliGRAM(s) IV Push every 8 hours PRN Nausea and/or Vomiting    I&O's Summary    01 Sep 2023 07:01  -  02 Sep 2023 07:00  --------------------------------------------------------  IN: 840 mL / OUT: 0 mL / NET: 840 mL    02 Sep 2023 07:01  -  02 Sep 2023 14:25  --------------------------------------------------------  IN: 600 mL / OUT: 0 mL / NET: 600 mL    PHYSICAL EXAM:  Vital Signs Last 24 Hrs  T(C): 36.3 (02 Sep 2023 11:24), Max: 36.9 (01 Sep 2023 20:10)  T(F): 97.4 (02 Sep 2023 11:24), Max: 98.5 (01 Sep 2023 20:10)  HR: 81 (02 Sep 2023 11:24) (81 - 95)  BP: 96/69 (02 Sep 2023 11:24) (96/69 - 107/71)  RR: 18 (02 Sep 2023 11:24) (16 - 18)  SpO2: 100% (02 Sep 2023 11:24) (95% - 100%)    Parameters below as of 02 Sep 2023 11:24  Patient On (Oxygen Delivery Method): room air    CONSTITUTIONAL: NAD  RESPIRATORY: Normal respiratory effort; lungs are clear to auscultation bilaterally  CARDIOVASCULAR: Irregularly irregular rhythm, normal S1 and S2, no murmurs; +2 pitting lower extremity edema   ABDOMEN: Nontender to palpation, normoactive bowel sounds, no rebound/guarding; No hepatosplenomegaly  MUSCLOSKELETAL: no clubbing or cyanosis of digits; no joint swelling or tenderness to palpation  PSYCH: A+O to person, place, and time; affect appropriate    LABS:                        9.4    4.48  )-----------( 122      ( 02 Sep 2023 07:07 )             29.0     09-02    134<L>  |  92<L>  |  13  ----------------------------<  101<H>  3.3<L>   |  30  |  0.63    Ca    8.7      02 Sep 2023 07:05  Mg     1.9     09-01    PT/INR - ( 02 Sep 2023 07:08 )   PT: 20.0 sec;   INR: 1.94 ratio     PTT - ( 02 Sep 2023 07:08 )  PTT:38.5 sec    Urinalysis Basic - ( 02 Sep 2023 07:05 )    Color: x / Appearance: x / SG: x / pH: x  Gluc: 101 mg/dL / Ketone: x  / Bili: x / Urobili: x   Blood: x / Protein: x / Nitrite: x   Leuk Esterase: x / RBC: x / WBC x   Sq Epi: x / Non Sq Epi: x / Bacteria: x    < from: TTE W or WO Ultrasound Enhancing Agent (08.31.23 @ 10:35) >  CONCLUSIONS:      1. Moderately dilated left ventricular cavity size. Left ventricular wall thickness is normal. Left ventricular systolic function is severely decreased with an ejection fraction of 20 % by Ramon's method of disks. There is global left ventricular hypokinesis.   2. Severely enlarged right ventricular cavity size, normal right ventricular wall thickness and normal right ventricular systolic function.   3. An annuloplasty ring is noted in the mitral position.   4. Severe mitral regurgitation.   5. Percutaneous tricuspid valve in previous surgical valve.   6. Pulmonic valve was not well visualized.    < end of copied text >

## 2023-09-02 NOTE — PROGRESS NOTE ADULT - ASSESSMENT
53F with biventricular HF, afib on coumadin (s/p MV and TV repair), recent hemorrhage after tooth extraction on coumadin, admitted for right heart failure exacerbation.

## 2023-09-03 LAB
ANION GAP SERPL CALC-SCNC: 10 MMOL/L — SIGNIFICANT CHANGE UP (ref 5–17)
BUN SERPL-MCNC: 13 MG/DL — SIGNIFICANT CHANGE UP (ref 7–23)
CALCIUM SERPL-MCNC: 9.2 MG/DL — SIGNIFICANT CHANGE UP (ref 8.4–10.5)
CHLORIDE SERPL-SCNC: 93 MMOL/L — LOW (ref 96–108)
CO2 SERPL-SCNC: 30 MMOL/L — SIGNIFICANT CHANGE UP (ref 22–31)
CREAT SERPL-MCNC: 0.55 MG/DL — SIGNIFICANT CHANGE UP (ref 0.5–1.3)
EGFR: 109 ML/MIN/1.73M2 — SIGNIFICANT CHANGE UP
GLUCOSE SERPL-MCNC: 107 MG/DL — HIGH (ref 70–99)
INR BLD: 2.09 RATIO — HIGH (ref 0.85–1.18)
MAGNESIUM SERPL-MCNC: 1.9 MG/DL — SIGNIFICANT CHANGE UP (ref 1.6–2.6)
PHOSPHATE SERPL-MCNC: 2.8 MG/DL — SIGNIFICANT CHANGE UP (ref 2.5–4.5)
POTASSIUM SERPL-MCNC: 3.4 MMOL/L — LOW (ref 3.5–5.3)
POTASSIUM SERPL-SCNC: 3.4 MMOL/L — LOW (ref 3.5–5.3)
PROTHROM AB SERPL-ACNC: 21.5 SEC — HIGH (ref 9.5–13)
SODIUM SERPL-SCNC: 133 MMOL/L — LOW (ref 135–145)

## 2023-09-03 PROCEDURE — 99232 SBSQ HOSP IP/OBS MODERATE 35: CPT

## 2023-09-03 RX ORDER — POTASSIUM CHLORIDE 20 MEQ
40 PACKET (EA) ORAL ONCE
Refills: 0 | Status: COMPLETED | OUTPATIENT
Start: 2023-09-03 | End: 2023-09-03

## 2023-09-03 RX ORDER — SPIRONOLACTONE 25 MG/1
25 TABLET, FILM COATED ORAL
Refills: 0 | Status: DISCONTINUED | OUTPATIENT
Start: 2023-09-03 | End: 2023-09-10

## 2023-09-03 RX ORDER — WARFARIN SODIUM 2.5 MG/1
7.5 TABLET ORAL ONCE
Refills: 0 | Status: COMPLETED | OUTPATIENT
Start: 2023-09-03 | End: 2023-09-03

## 2023-09-03 RX ADMIN — Medication 60 MILLIGRAM(S): at 05:47

## 2023-09-03 RX ADMIN — Medication 60 MILLIGRAM(S): at 17:26

## 2023-09-03 RX ADMIN — Medication 50 MILLIGRAM(S): at 05:47

## 2023-09-03 RX ADMIN — SPIRONOLACTONE 25 MILLIGRAM(S): 25 TABLET, FILM COATED ORAL at 05:47

## 2023-09-03 RX ADMIN — SPIRONOLACTONE 25 MILLIGRAM(S): 25 TABLET, FILM COATED ORAL at 17:26

## 2023-09-03 RX ADMIN — Medication 40 MILLIEQUIVALENT(S): at 08:17

## 2023-09-03 RX ADMIN — WARFARIN SODIUM 7.5 MILLIGRAM(S): 2.5 TABLET ORAL at 21:04

## 2023-09-03 NOTE — PROGRESS NOTE ADULT - SUBJECTIVE AND OBJECTIVE BOX
[facility] Division of Hospital Medicine  Jaskaran Murillo MD  Available via MS Teams    PROGRESS NOTE:   Patient is a 53y old  Female who presents with a chief complaint of fluid overload (02 Sep 2023 14:24)    SUBJECTIVE / OVERNIGHT EVENTS:  No acute events overnight. Patient seen and evaluated at bedside.   Has persistent lower extremity edema.   Denies SOB at rest, chest pain, palpitations, abdominal pain, nausea/vomiting    MEDICATIONS  (STANDING):  furosemide   Injectable 60 milliGRAM(s) IV Push every 12 hours  metoprolol succinate ER 50 milliGRAM(s) Oral daily  spironolactone 25 milliGRAM(s) Oral daily  Tirosint (levothyroxine) 175 mcg 1 Capsule(s) 1 Capsule(s) Oral <User Schedule>    MEDICATIONS  (PRN):  acetaminophen     Tablet .. 650 milliGRAM(s) Oral every 6 hours PRN Temp greater or equal to 38C (100.4F), Mild Pain (1 - 3)  aluminum hydroxide/magnesium hydroxide/simethicone Suspension 30 milliLiter(s) Oral every 4 hours PRN Dyspepsia  melatonin 3 milliGRAM(s) Oral at bedtime PRN Insomnia  ondansetron Injectable 4 milliGRAM(s) IV Push every 8 hours PRN Nausea and/or Vomiting    I&O's Summary    02 Sep 2023 07:01  -  03 Sep 2023 07:00  --------------------------------------------------------  IN: 600 mL / OUT: 0 mL / NET: 600 mL    03 Sep 2023 07:01  -  03 Sep 2023 12:21  --------------------------------------------------------  IN: 240 mL / OUT: 0 mL / NET: 240 mL    PHYSICAL EXAM:  Vital Signs Last 24 Hrs  T(C): 36.3 (03 Sep 2023 11:34), Max: 36.9 (03 Sep 2023 04:05)  T(F): 97.3 (03 Sep 2023 11:34), Max: 98.4 (03 Sep 2023 04:05)  HR: 99 (03 Sep 2023 11:34) (80 - 99)  BP: 98/75 (03 Sep 2023 11:34) (98/75 - 104/71)  RR: 18 (03 Sep 2023 11:34) (18 - 18)  SpO2: 97% (03 Sep 2023 11:34) (94% - 97%)    Parameters below as of 03 Sep 2023 11:34  Patient On (Oxygen Delivery Method): room air    CONSTITUTIONAL: NAD  RESPIRATORY: Normal respiratory effort; lungs are clear to auscultation bilaterally  CARDIOVASCULAR: Irregularly irregular rhythm, normal rate, normal S1/S2, no murmurs. +2 pitting lower extremity edema   ABDOMEN: Nontender to palpation, normoactive bowel sounds, no rebound/guarding; No hepatosplenomegaly  MUSCLOSKELETAL: no clubbing or cyanosis of digits; no joint swelling or tenderness to palpation  NEURO: No focal deficits, upper/lower motor strength grossly intact bilaterally   PSYCH: A+O to person, place, and time; affect appropriate    LABS:                        9.4    4.48  )-----------( 122      ( 02 Sep 2023 07:07 )             29.0     09-03    133<L>  |  93<L>  |  13  ----------------------------<  107<H>  3.4<L>   |  30  |  0.55    Ca    9.2      03 Sep 2023 05:40  Phos  2.8     09-03  Mg     1.9     09-03      PT/INR - ( 03 Sep 2023 05:42 )   PT: 21.5 sec;   INR: 2.09 ratio    PTT - ( 02 Sep 2023 07:08 )  PTT:38.5 sec    Urinalysis Basic - ( 03 Sep 2023 05:40 )    Color: x / Appearance: x / SG: x / pH: x  Gluc: 107 mg/dL / Ketone: x  / Bili: x / Urobili: x   Blood: x / Protein: x / Nitrite: x   Leuk Esterase: x / RBC: x / WBC x   Sq Epi: x / Non Sq Epi: x / Bacteria: x

## 2023-09-04 LAB
ANION GAP SERPL CALC-SCNC: 13 MMOL/L — SIGNIFICANT CHANGE UP (ref 5–17)
BUN SERPL-MCNC: 16 MG/DL — SIGNIFICANT CHANGE UP (ref 7–23)
CALCIUM SERPL-MCNC: 9.5 MG/DL — SIGNIFICANT CHANGE UP (ref 8.4–10.5)
CHLORIDE SERPL-SCNC: 90 MMOL/L — LOW (ref 96–108)
CO2 SERPL-SCNC: 29 MMOL/L — SIGNIFICANT CHANGE UP (ref 22–31)
CREAT SERPL-MCNC: 0.7 MG/DL — SIGNIFICANT CHANGE UP (ref 0.5–1.3)
EGFR: 103 ML/MIN/1.73M2 — SIGNIFICANT CHANGE UP
GLUCOSE SERPL-MCNC: 103 MG/DL — HIGH (ref 70–99)
INR BLD: 2.01 RATIO — HIGH (ref 0.85–1.18)
MAGNESIUM SERPL-MCNC: 2 MG/DL — SIGNIFICANT CHANGE UP (ref 1.6–2.6)
PHOSPHATE SERPL-MCNC: 3.1 MG/DL — SIGNIFICANT CHANGE UP (ref 2.5–4.5)
POTASSIUM SERPL-MCNC: 3.6 MMOL/L — SIGNIFICANT CHANGE UP (ref 3.5–5.3)
POTASSIUM SERPL-SCNC: 3.6 MMOL/L — SIGNIFICANT CHANGE UP (ref 3.5–5.3)
PROTHROM AB SERPL-ACNC: 21.7 SEC — HIGH (ref 9.5–13)
SODIUM SERPL-SCNC: 132 MMOL/L — LOW (ref 135–145)

## 2023-09-04 PROCEDURE — 99232 SBSQ HOSP IP/OBS MODERATE 35: CPT

## 2023-09-04 RX ORDER — WARFARIN SODIUM 2.5 MG/1
7.5 TABLET ORAL ONCE
Refills: 0 | Status: COMPLETED | OUTPATIENT
Start: 2023-09-04 | End: 2023-09-04

## 2023-09-04 RX ADMIN — SPIRONOLACTONE 25 MILLIGRAM(S): 25 TABLET, FILM COATED ORAL at 05:52

## 2023-09-04 RX ADMIN — Medication 60 MILLIGRAM(S): at 17:16

## 2023-09-04 RX ADMIN — WARFARIN SODIUM 7.5 MILLIGRAM(S): 2.5 TABLET ORAL at 21:39

## 2023-09-04 RX ADMIN — Medication 60 MILLIGRAM(S): at 05:52

## 2023-09-04 RX ADMIN — SPIRONOLACTONE 25 MILLIGRAM(S): 25 TABLET, FILM COATED ORAL at 17:16

## 2023-09-04 RX ADMIN — Medication 50 MILLIGRAM(S): at 05:52

## 2023-09-04 NOTE — PROGRESS NOTE ADULT - SUBJECTIVE AND OBJECTIVE BOX
[facility] Division of Hospital Medicine  Jaskaran Murillo MD  Available via MS Teams    PROGRESS NOTE:     Patient is a 53y old  Female who presents with a chief complaint of fluid overload (03 Sep 2023 12:21)    SUBJECTIVE / OVERNIGHT EVENTS:  No acute events overnight. Patient seen and evaluated at bedside.   Lower extremity swelling improved with compression stockings and leg elevation.   No additional subjective complaints     No fever/chills.  Denies SOB at rest, chest pain, palpitations, abdominal pain, nausea/vomiting    MEDICATIONS  (STANDING):  furosemide   Injectable 60 milliGRAM(s) IV Push every 12 hours  metoprolol succinate ER 50 milliGRAM(s) Oral daily  spironolactone 25 milliGRAM(s) Oral two times a day  Tirosint (levothyroxine) 175 mcg 1 Capsule(s) 1 Capsule(s) Oral <User Schedule>    MEDICATIONS  (PRN):  acetaminophen     Tablet .. 650 milliGRAM(s) Oral every 6 hours PRN Temp greater or equal to 38C (100.4F), Mild Pain (1 - 3)  aluminum hydroxide/magnesium hydroxide/simethicone Suspension 30 milliLiter(s) Oral every 4 hours PRN Dyspepsia  melatonin 3 milliGRAM(s) Oral at bedtime PRN Insomnia  ondansetron Injectable 4 milliGRAM(s) IV Push every 8 hours PRN Nausea and/or Vomiting    I&O's Summary    03 Sep 2023 07:01  -  04 Sep 2023 07:00  --------------------------------------------------------  IN: 600 mL / OUT: 1000 mL / NET: -400 mL    04 Sep 2023 07:01  -  04 Sep 2023 09:51  --------------------------------------------------------  IN: 120 mL / OUT: 0 mL / NET: 120 mL    PHYSICAL EXAM:  Vital Signs Last 24 Hrs  T(C): 36.8 (04 Sep 2023 04:30), Max: 36.8 (04 Sep 2023 04:30)  T(F): 98.2 (04 Sep 2023 04:30), Max: 98.2 (04 Sep 2023 04:30)  HR: 91 (04 Sep 2023 05:50) (82 - 106)  BP: 105/72 (04 Sep 2023 05:50) (98/75 - 105/74)  BP(mean): 83 (03 Sep 2023 20:09) (83 - 83)  RR: 18 (04 Sep 2023 04:30) (18 - 18)  SpO2: 99% (04 Sep 2023 04:30) (97% - 100%)    Parameters below as of 04 Sep 2023 08:10  Patient On (Oxygen Delivery Method): room air    CONSTITUTIONAL: NAD  RESPIRATORY: Normal respiratory effort; lungs are clear to auscultation bilaterally  CARDIOVASCULAR: irregularly irregular rhythm, normal rate normal S1/S2, no murmurs. +2 pitting lower extremity edema, +JVD   ABDOMEN: Nontender to palpation, normoactive bowel sounds, no rebound/guarding; No hepatosplenomegaly  MUSCLOSKELETAL: no clubbing or cyanosis of digits; no joint swelling or tenderness to palpation  NEURO: No focal deficits, upper/lower motor strength grossly intact bilaterally   EXT: Warm, well perfused   PSYCH: A+O to person, place, and time; affect appropriate    LABS:    09-04    132<L>  |  90<L>  |  16  ----------------------------<  103<H>  3.6   |  29  |  0.70    Ca    9.5      04 Sep 2023 06:57  Phos  3.1     09-04  Mg     2.0     09-04    PT/INR - ( 04 Sep 2023 06:57 )   PT: 21.7 sec;   INR: 2.01 ratio

## 2023-09-05 ENCOUNTER — APPOINTMENT (OUTPATIENT)
Dept: CARDIOLOGY | Facility: CLINIC | Age: 53
End: 2023-09-05

## 2023-09-05 DIAGNOSIS — N28.89 OTHER SPECIFIED DISORDERS OF KIDNEY AND URETER: ICD-10-CM

## 2023-09-05 LAB
ANION GAP SERPL CALC-SCNC: 12 MMOL/L — SIGNIFICANT CHANGE UP (ref 5–17)
BUN SERPL-MCNC: 16 MG/DL — SIGNIFICANT CHANGE UP (ref 7–23)
CALCIUM SERPL-MCNC: 9.2 MG/DL — SIGNIFICANT CHANGE UP (ref 8.4–10.5)
CHLORIDE SERPL-SCNC: 89 MMOL/L — LOW (ref 96–108)
CO2 SERPL-SCNC: 31 MMOL/L — SIGNIFICANT CHANGE UP (ref 22–31)
CREAT SERPL-MCNC: 0.67 MG/DL — SIGNIFICANT CHANGE UP (ref 0.5–1.3)
EGFR: 104 ML/MIN/1.73M2 — SIGNIFICANT CHANGE UP
GLUCOSE SERPL-MCNC: 109 MG/DL — HIGH (ref 70–99)
HCT VFR BLD CALC: 27.6 % — LOW (ref 34.5–45)
HGB BLD-MCNC: 9.1 G/DL — LOW (ref 11.5–15.5)
INR BLD: 2.07 RATIO — HIGH (ref 0.85–1.18)
MAGNESIUM SERPL-MCNC: 1.9 MG/DL — SIGNIFICANT CHANGE UP (ref 1.6–2.6)
MCHC RBC-ENTMCNC: 33 GM/DL — SIGNIFICANT CHANGE UP (ref 32–36)
MCHC RBC-ENTMCNC: 33.2 PG — SIGNIFICANT CHANGE UP (ref 27–34)
MCV RBC AUTO: 100.7 FL — HIGH (ref 80–100)
NRBC # BLD: 0 /100 WBCS — SIGNIFICANT CHANGE UP (ref 0–0)
PHOSPHATE SERPL-MCNC: 3.7 MG/DL — SIGNIFICANT CHANGE UP (ref 2.5–4.5)
PLATELET # BLD AUTO: 114 K/UL — LOW (ref 150–400)
POTASSIUM SERPL-MCNC: 3.2 MMOL/L — LOW (ref 3.5–5.3)
POTASSIUM SERPL-SCNC: 3.2 MMOL/L — LOW (ref 3.5–5.3)
PROTHROM AB SERPL-ACNC: 21.3 SEC — HIGH (ref 9.5–13)
RBC # BLD: 2.74 M/UL — LOW (ref 3.8–5.2)
RBC # FLD: 15.9 % — HIGH (ref 10.3–14.5)
SODIUM SERPL-SCNC: 132 MMOL/L — LOW (ref 135–145)
WBC # BLD: 4.34 K/UL — SIGNIFICANT CHANGE UP (ref 3.8–10.5)
WBC # FLD AUTO: 4.34 K/UL — SIGNIFICANT CHANGE UP (ref 3.8–10.5)

## 2023-09-05 PROCEDURE — 99232 SBSQ HOSP IP/OBS MODERATE 35: CPT

## 2023-09-05 RX ORDER — POTASSIUM CHLORIDE 20 MEQ
40 PACKET (EA) ORAL EVERY 4 HOURS
Refills: 0 | Status: COMPLETED | OUTPATIENT
Start: 2023-09-05 | End: 2023-09-05

## 2023-09-05 RX ORDER — SACUBITRIL AND VALSARTAN 24; 26 MG/1; MG/1
1 TABLET, FILM COATED ORAL
Refills: 0 | Status: DISCONTINUED | OUTPATIENT
Start: 2023-09-05 | End: 2023-09-06

## 2023-09-05 RX ORDER — WARFARIN SODIUM 2.5 MG/1
7.5 TABLET ORAL ONCE
Refills: 0 | Status: COMPLETED | OUTPATIENT
Start: 2023-09-05 | End: 2023-09-05

## 2023-09-05 RX ADMIN — SPIRONOLACTONE 25 MILLIGRAM(S): 25 TABLET, FILM COATED ORAL at 05:25

## 2023-09-05 RX ADMIN — Medication 40 MILLIEQUIVALENT(S): at 12:12

## 2023-09-05 RX ADMIN — Medication 40 MILLIEQUIVALENT(S): at 17:18

## 2023-09-05 RX ADMIN — SACUBITRIL AND VALSARTAN 1 TABLET(S): 24; 26 TABLET, FILM COATED ORAL at 17:19

## 2023-09-05 RX ADMIN — SPIRONOLACTONE 25 MILLIGRAM(S): 25 TABLET, FILM COATED ORAL at 17:18

## 2023-09-05 RX ADMIN — WARFARIN SODIUM 7.5 MILLIGRAM(S): 2.5 TABLET ORAL at 21:17

## 2023-09-05 RX ADMIN — Medication 60 MILLIGRAM(S): at 05:25

## 2023-09-05 RX ADMIN — Medication 20 MILLIGRAM(S): at 12:12

## 2023-09-05 RX ADMIN — Medication 50 MILLIGRAM(S): at 05:25

## 2023-09-05 NOTE — PROGRESS NOTE ADULT - SUBJECTIVE AND OBJECTIVE BOX
no events overnight.    GENERAL: No fevers, no chills.  EYES: No blurry vision,  No photophobia  ENT: No sore throat.  No dysphagia  Cardiovascular: No chest pain, palpitations, orthopnea  Pulmonary: No cough, no wheezing. No shortness of breath  Gastrointestinal: No abdominal pain, no diarrhea, no constipation.    Musculoskeletal: No weakness.  No myalgias.  Dermatology:  No rashes.  Neuro: No Headache.  No vertigo.  No dizziness.  Psych: No anxiety, no depression.  Denies suicidal thoughts.    MEDICATIONS  (STANDING):  metoprolol succinate ER 50 milliGRAM(s) Oral daily  potassium chloride    Tablet ER 40 milliEquivalent(s) Oral every 4 hours  sacubitril 24 mG/valsartan 26 mG 1 Tablet(s) Oral two times a day  spironolactone 25 milliGRAM(s) Oral two times a day  Tirosint (levothyroxine) 175 mcg 1 Capsule(s) 1 Capsule(s) Oral <User Schedule>  torsemide 20 milliGRAM(s) Oral two times a day  warfarin 7.5 milliGRAM(s) Oral once    MEDICATIONS  (PRN):  acetaminophen     Tablet .. 650 milliGRAM(s) Oral every 6 hours PRN Temp greater or equal to 38C (100.4F), Mild Pain (1 - 3)  aluminum hydroxide/magnesium hydroxide/simethicone Suspension 30 milliLiter(s) Oral every 4 hours PRN Dyspepsia  melatonin 3 milliGRAM(s) Oral at bedtime PRN Insomnia  ondansetron Injectable 4 milliGRAM(s) IV Push every 8 hours PRN Nausea and/or Vomiting    Vital Signs Last 24 Hrs  T(C): 37.1 (05 Sep 2023 11:00), Max: 37.1 (05 Sep 2023 11:00)  T(F): 98.7 (05 Sep 2023 11:00), Max: 98.7 (05 Sep 2023 11:00)  HR: 92 (05 Sep 2023 11:00) (76 - 99)  BP: 105/72 (05 Sep 2023 11:00) (94/63 - 114/74)  BP(mean): --  RR: 18 (05 Sep 2023 11:00) (16 - 18)  SpO2: 100% (05 Sep 2023 11:00) (93% - 100%)    Parameters below as of 05 Sep 2023 11:00  Patient On (Oxygen Delivery Method): room air    CONSTITUTIONAL: NAD  RESPIRATORY: Normal respiratory effort; lungs are clear to auscultation bilaterally  CARDIOVASCULAR: irregularly irregular rhythm, normal rate normal S1/S2, no murmurs. +2 pitting lower extremity edema, +JVD   ABDOMEN: Nontender to palpation, normoactive bowel sounds, no rebound/guarding; No hepatosplenomegaly  MUSCLOSKELETAL: no clubbing or cyanosis of digits; no joint swelling or tenderness to palpation  NEURO: No focal deficits, upper/lower motor strength grossly intact bilaterally   EXT: Warm, well perfused   PSYCH: A+O to person, place, and time; affect appropriate    .  LABS:                         9.1    4.34  )-----------( 114      ( 05 Sep 2023 06:19 )             27.6     09-05    132<L>  |  89<L>  |  16  ----------------------------<  109<H>  3.2<L>   |  31  |  0.67    Ca    9.2      05 Sep 2023 06:19  Phos  3.7     09-05  Mg     1.9     09-05      PT/INR - ( 05 Sep 2023 06:19 )   PT: 21.3 sec;   INR: 2.07 ratio           Urinalysis Basic - ( 05 Sep 2023 06:19 )    Color: x / Appearance: x / SG: x / pH: x  Gluc: 109 mg/dL / Ketone: x  / Bili: x / Urobili: x   Blood: x / Protein: x / Nitrite: x   Leuk Esterase: x / RBC: x / WBC x   Sq Epi: x / Non Sq Epi: x / Bacteria: x            RADIOLOGY, EKG & ADDITIONAL TESTS: Reviewed.

## 2023-09-05 NOTE — PROGRESS NOTE ADULT - ASSESSMENT
Patient weight now at 140 with addition of metalazone. Still anemic and now hypokalemicbut hemodynamically stable    1. Right heart failure/left heart failure right greater then left- improved  2. good weight los with addition of zaroxylyn  3. afib VR controlled  4. hypokalemic    Recommend  1. check iron stores  2. change to Po torsemide 20 BID  3.hold zaroxylyn  4. replace postasium  5. start low dose entresto 24-26 BID  6. OOB and ambulate

## 2023-09-05 NOTE — PROGRESS NOTE ADULT - SUBJECTIVE AND OBJECTIVE BOX
Subjective: patient remains stable withou sob or cp or palpitations  weight today 140 received zaroxylyn yesterday and today  BP holding anemic    MEDICATIONS:  MEDICATIONS  (STANDING):  furosemide   Injectable 60 milliGRAM(s) IV Push every 12 hours  metolazone 2.5 milliGRAM(s) Oral daily  metoprolol succinate ER 50 milliGRAM(s) Oral daily  spironolactone 25 milliGRAM(s) Oral two times a day  Tirosint (levothyroxine) 175 mcg 1 Capsule(s) 1 Capsule(s) Oral <User Schedule>      PHYSICAL EXAM:  T(C): 36.5 (09-05-23 @ 04:29), Max: 36.8 (09-04-23 @ 10:39)  HR: 99 (09-05-23 @ 05:22) (76 - 99)  BP: 102/65 (09-05-23 @ 05:22) (94/63 - 114/74)  RR: 18 (09-05-23 @ 04:29) (16 - 18)  SpO2: 93% (09-05-23 @ 04:29) (93% - 98%)  Wt(kg): --  I&O's Summary    04 Sep 2023 07:01  -  05 Sep 2023 07:00  --------------------------------------------------------  IN: 840 mL / OUT: 3700 mL / NET: -2860 mL    05 Sep 2023 07:01  -  05 Sep 2023 08:47  --------------------------------------------------------  IN: 120 mL / OUT: 0 mL / NET: 120 mL          Appearance: Normal	  HEENT:   Normal oral mucosa, PERRL, EOMI	  Cardiovascular: Normal S1 S2, irregualry irregular moderate JVD, No murmurs ,  Respiratory: Lungs clear to auscultation, normal effort 	  Gastrointestinal:  Soft, Non-tender, + BS	  Skin: No rashes, No ecchymoses, No cyanosis, warm to touch  Musculoskeletal: Normal range of motion, normal strength  Psychiatry:  Mood & affect appropriate  Ext: 1+ edema (markedly decreased)  Peripheral pulses palpable 2+ bilaterally      LABS:    CARDIAC MARKERS:                                9.1    4.34  )-----------( 114      ( 05 Sep 2023 06:19 )             27.6     09-05    132<L>  |  89<L>  |  16  ----------------------------<  109<H>  3.2<L>   |  31  |  0.67    Ca    9.2      05 Sep 2023 06:19  Phos  3.7     09-05  Mg     1.9     09-05      proBNP:   Lipid Profile:   HgA1c:   TSH:           TELEMETRY: 	    ECG:afib RBBB  	  RADIOLOGY:   DIAGNOSTIC TESTING:  [ ] Echocardiogram:  [ ]  Catheterization:  [ ] Stress Test:    OTHER: 	           Subjective: patient remains stable withou sob or cp or palpitations  weight today 140 received zaroxylyn yesterday and today  BP holding anemic  ambulating without diffulty    MEDICATIONS:  MEDICATIONS  (STANDING):  furosemide   Injectable 60 milliGRAM(s) IV Push every 12 hours  metolazone 2.5 milliGRAM(s) Oral daily  metoprolol succinate ER 50 milliGRAM(s) Oral daily  spironolactone 25 milliGRAM(s) Oral two times a day  Tirosint (levothyroxine) 175 mcg 1 Capsule(s) 1 Capsule(s) Oral <User Schedule>      PHYSICAL EXAM:  T(C): 36.5 (09-05-23 @ 04:29), Max: 36.8 (09-04-23 @ 10:39)  HR: 99 (09-05-23 @ 05:22) (76 - 99)  BP: 102/65 (09-05-23 @ 05:22) (94/63 - 114/74)  RR: 18 (09-05-23 @ 04:29) (16 - 18)  SpO2: 93% (09-05-23 @ 04:29) (93% - 98%)  Wt(kg): --  I&O's Summary    04 Sep 2023 07:01  -  05 Sep 2023 07:00  --------------------------------------------------------  IN: 840 mL / OUT: 3700 mL / NET: -2860 mL    05 Sep 2023 07:01  -  05 Sep 2023 08:47  --------------------------------------------------------  IN: 120 mL / OUT: 0 mL / NET: 120 mL          Appearance: Normal awake alert in good spirits  HEENT:   Normal oral mucosa, PERRL, EOMI	  Cardiovascular: Normal S1 S2, irregularly irregular moderate JVD, 1/6 murmur at apex,  Respiratory: Lungs clear to auscultation, normal effort 	  Gastrointestinal:  Soft, Non-tender, + BS	  Skin: No rashes, No ecchymoses, No cyanosis, warm to touch  Musculoskeletal: Normal range of motion, normal strength  Psychiatry:  Mood & affect appropriate  Ext: 1+ edema (decreased)  Peripheral pulses palpable 2+ bilaterally      LABS:    CARDIAC MARKERS:                                9.1    4.34  )-----------( 114      ( 05 Sep 2023 06:19 )             27.6     09-05    132<L>  |  89<L>  |  16  ----------------------------<  109<H>  3.2<L>   |  31  |  0.67    Ca    9.2      05 Sep 2023 06:19  Phos  3.7     09-05  Mg     1.9     09-05      proBNP:   Lipid Profile:   HgA1c:   TSH:           TELEMETRY: 	    ECG:afib RBBB  	  RADIOLOGY:   DIAGNOSTIC TESTING:  [ ] Echocardiogram:  [ ]  Catheterization:  [ ] Stress Test:    OTHER:

## 2023-09-05 NOTE — PROVIDER CONTACT NOTE (OTHER) - ACTION/TREATMENT ORDERED:
Confirmed ID with patient   Permission received to speak to  Daniel Arango      Per  pt has h/o dementia.    No official diagnosis has been made Night [facility] made aware. no new orders at this time Night OMER Woods made aware. no new orders at this time. pt asymptomatic

## 2023-09-06 LAB
ALBUMIN SERPL ELPH-MCNC: 4.2 G/DL — SIGNIFICANT CHANGE UP (ref 3.3–5)
ALP SERPL-CCNC: 90 U/L — SIGNIFICANT CHANGE UP (ref 40–120)
ALT FLD-CCNC: 11 U/L — SIGNIFICANT CHANGE UP (ref 10–45)
ANION GAP SERPL CALC-SCNC: 11 MMOL/L — SIGNIFICANT CHANGE UP (ref 5–17)
AST SERPL-CCNC: 22 U/L — SIGNIFICANT CHANGE UP (ref 10–40)
BILIRUB SERPL-MCNC: 2 MG/DL — HIGH (ref 0.2–1.2)
BUN SERPL-MCNC: 14 MG/DL — SIGNIFICANT CHANGE UP (ref 7–23)
CALCIUM SERPL-MCNC: 9.8 MG/DL — SIGNIFICANT CHANGE UP (ref 8.4–10.5)
CHLORIDE SERPL-SCNC: 85 MMOL/L — LOW (ref 96–108)
CO2 SERPL-SCNC: 32 MMOL/L — HIGH (ref 22–31)
CREAT SERPL-MCNC: 0.63 MG/DL — SIGNIFICANT CHANGE UP (ref 0.5–1.3)
EGFR: 105 ML/MIN/1.73M2 — SIGNIFICANT CHANGE UP
FERRITIN SERPL-MCNC: 66 NG/ML — SIGNIFICANT CHANGE UP (ref 13–330)
FOLATE RBC-MCNC: 2299 NG/ML — HIGH (ref 499–1504)
GLUCOSE SERPL-MCNC: 107 MG/DL — HIGH (ref 70–99)
HCT VFR BLD CALC: 33.3 % — LOW (ref 34.5–45)
HCT VFR BLD CALC: 34.1 % — LOW (ref 34.5–45)
HGB BLD-MCNC: 11.1 G/DL — LOW (ref 11.5–15.5)
INR BLD: 1.96 RATIO — HIGH (ref 0.85–1.18)
IRON SATN MFR SERPL: 42 UG/DL — SIGNIFICANT CHANGE UP (ref 30–160)
IRON SATN MFR SERPL: 9 % — LOW (ref 14–50)
MCHC RBC-ENTMCNC: 33.3 GM/DL — SIGNIFICANT CHANGE UP (ref 32–36)
MCHC RBC-ENTMCNC: 33.4 PG — SIGNIFICANT CHANGE UP (ref 27–34)
MCV RBC AUTO: 100.3 FL — HIGH (ref 80–100)
NRBC # BLD: 0 /100 WBCS — SIGNIFICANT CHANGE UP (ref 0–0)
PLATELET # BLD AUTO: 160 K/UL — SIGNIFICANT CHANGE UP (ref 150–400)
POTASSIUM SERPL-MCNC: 3.6 MMOL/L — SIGNIFICANT CHANGE UP (ref 3.5–5.3)
POTASSIUM SERPL-SCNC: 3.6 MMOL/L — SIGNIFICANT CHANGE UP (ref 3.5–5.3)
PROT SERPL-MCNC: 6.7 G/DL — SIGNIFICANT CHANGE UP (ref 6–8.3)
PROTHROM AB SERPL-ACNC: 21.1 SEC — HIGH (ref 9.5–13)
RBC # BLD: 3.32 M/UL — LOW (ref 3.8–5.2)
RBC # FLD: 15.7 % — HIGH (ref 10.3–14.5)
SODIUM SERPL-SCNC: 128 MMOL/L — LOW (ref 135–145)
TIBC SERPL-MCNC: 484 UG/DL — HIGH (ref 220–430)
UIBC SERPL-MCNC: 442 UG/DL — HIGH (ref 110–370)
VIT B12 SERPL-MCNC: 926 PG/ML — SIGNIFICANT CHANGE UP (ref 232–1245)
WBC # BLD: 5.22 K/UL — SIGNIFICANT CHANGE UP (ref 3.8–10.5)
WBC # FLD AUTO: 5.22 K/UL — SIGNIFICANT CHANGE UP (ref 3.8–10.5)

## 2023-09-06 PROCEDURE — 99232 SBSQ HOSP IP/OBS MODERATE 35: CPT

## 2023-09-06 PROCEDURE — 74174 CTA ABD&PLVS W/CONTRAST: CPT | Mod: 26

## 2023-09-06 RX ORDER — WARFARIN SODIUM 2.5 MG/1
7.5 TABLET ORAL ONCE
Refills: 0 | Status: COMPLETED | OUTPATIENT
Start: 2023-09-06 | End: 2023-09-06

## 2023-09-06 RX ORDER — SODIUM CHLORIDE 9 MG/ML
1000 INJECTION INTRAMUSCULAR; INTRAVENOUS; SUBCUTANEOUS
Refills: 0 | Status: DISCONTINUED | OUTPATIENT
Start: 2023-09-06 | End: 2023-09-09

## 2023-09-06 RX ORDER — POTASSIUM CHLORIDE 20 MEQ
40 PACKET (EA) ORAL ONCE
Refills: 0 | Status: COMPLETED | OUTPATIENT
Start: 2023-09-06 | End: 2023-09-08

## 2023-09-06 RX ADMIN — WARFARIN SODIUM 7.5 MILLIGRAM(S): 2.5 TABLET ORAL at 21:35

## 2023-09-06 RX ADMIN — SODIUM CHLORIDE 50 MILLILITER(S): 9 INJECTION INTRAMUSCULAR; INTRAVENOUS; SUBCUTANEOUS at 10:12

## 2023-09-06 NOTE — CHART NOTE - NSCHARTNOTEFT_GEN_A_CORE
MEDICINE PA NOTE    Patient hypotensive this AM, systolic 70-80s. Of note, Lasix 60 iv bid discontinued yesterday and started Entresto and torsemide.  As per cardiology, hypotension likely from over diuresis, Entresto and torsemide put on hold. Ordered gentle fluids. Will continue to monitor BP and restart GDMT as BP permits.

## 2023-09-06 NOTE — PROGRESS NOTE ADULT - SUBJECTIVE AND OBJECTIVE BOX
no events overnight.    GENERAL: No fevers, no chills.  EYES: No blurry vision,  No photophobia  ENT: No sore throat.  No dysphagia  Cardiovascular: No chest pain, palpitations, orthopnea  Pulmonary: No cough, no wheezing. No shortness of breath  Gastrointestinal: No abdominal pain, no diarrhea, no constipation.    Musculoskeletal: No weakness.  No myalgias.  Dermatology:  No rashes.  Neuro: No Headache.  No vertigo.  No dizziness.  Psych: No anxiety, no depression.  Denies suicidal thoughts.    MEDICATIONS  (STANDING):  metoprolol succinate ER 50 milliGRAM(s) Oral daily  potassium chloride    Tablet ER 40 milliEquivalent(s) Oral once  sodium chloride 0.9%. 1000 milliLiter(s) (50 mL/Hr) IV Continuous <Continuous>  spironolactone 25 milliGRAM(s) Oral two times a day  Tirosint (levothyroxine) 175 mcg 1 Capsule(s) 1 Capsule(s) Oral <User Schedule>  warfarin 7.5 milliGRAM(s) Oral once    MEDICATIONS  (PRN):  acetaminophen     Tablet .. 650 milliGRAM(s) Oral every 6 hours PRN Temp greater or equal to 38C (100.4F), Mild Pain (1 - 3)  aluminum hydroxide/magnesium hydroxide/simethicone Suspension 30 milliLiter(s) Oral every 4 hours PRN Dyspepsia  melatonin 3 milliGRAM(s) Oral at bedtime PRN Insomnia  ondansetron Injectable 4 milliGRAM(s) IV Push every 8 hours PRN Nausea and/or Vomiting    Vital Signs Last 24 Hrs  T(C): 36.9 (06 Sep 2023 04:43), Max: 36.9 (06 Sep 2023 04:43)  T(F): 98.4 (06 Sep 2023 04:43), Max: 98.4 (06 Sep 2023 04:43)  HR: 75 (06 Sep 2023 09:33) (75 - 83)  BP: 72/52 (06 Sep 2023 09:33) (72/52 - 106/73)  BP(mean): --  RR: 16 (06 Sep 2023 04:43) (16 - 18)  SpO2: 100% (06 Sep 2023 04:43) (99% - 100%)    Parameters below as of 06 Sep 2023 04:43  Patient On (Oxygen Delivery Method): room air    CONSTITUTIONAL: NAD  RESPIRATORY: Normal respiratory effort; lungs are clear to auscultation bilaterally  CARDIOVASCULAR: irregularly irregular rhythm, normal rate normal S1/S2, no murmurs. +2 pitting lower extremity edema, +JVD   ABDOMEN: Nontender to palpation, normoactive bowel sounds, no rebound/guarding; No hepatosplenomegaly  MUSCULOSKELETAL no clubbing or cyanosis of digits; no joint swelling or tenderness to palpation  NEURO: No focal deficits, upper/lower motor strength grossly intact bilaterally   EXT: Warm, well perfused   PSYCH: A+O to person, place, and time; affect appropriate    .  LABS:                         11.1   5.22  )-----------( 160      ( 06 Sep 2023 05:44 )             33.3     09-06    128<L>  |  85<L>  |  14  ----------------------------<  107<H>  3.6   |  32<H>  |  0.63    Ca    9.8      06 Sep 2023 05:43  Phos  3.7     09-05  Mg     1.9     09-05    TPro  6.7  /  Alb  4.2  /  TBili  2.0<H>  /  DBili  x   /  AST  22  /  ALT  11  /  AlkPhos  90  09-06    PT/INR - ( 06 Sep 2023 05:45 )   PT: 21.1 sec;   INR: 1.96 ratio           Urinalysis Basic - ( 06 Sep 2023 05:43 )    Color: x / Appearance: x / SG: x / pH: x  Gluc: 107 mg/dL / Ketone: x  / Bili: x / Urobili: x   Blood: x / Protein: x / Nitrite: x   Leuk Esterase: x / RBC: x / WBC x   Sq Epi: x / Non Sq Epi: x / Bacteria: x            RADIOLOGY, EKG & ADDITIONAL TESTS: Reviewed.

## 2023-09-06 NOTE — PROGRESS NOTE ADULT - SUBJECTIVE AND OBJECTIVE BOX
Subjective: patient remains stable without sob or cp or palpitations  weight today 140 on entresto BP soft but asymptomatic  ambulating without diffulty  now on PO torsemide aldactone    MEDICATIONS:  MEDICATIONS  (STANDING):  furosemide   Injectable 60 milliGRAM(s) IV Push every 12 hours  metoprolol succinate ER 50 milliGRAM(s) Oral daily  spironolactone 25 milliGRAM(s) Oral two times a day  Tirosint (levothyroxine) 175 mcg 1 Capsule(s) 1 Capsule(s) Oral <User Schedule>    Vital Signs Last 24 Hrs  T(C): 36.9 (09-06-23 @ 04:43), Max: 36.9 (09-06-23 @ 04:43)  T(F): 98.4 (09-06-23 @ 04:43), Max: 98.4 (09-06-23 @ 04:43)  HR: 88 (09-06-23 @ 16:31) (73 - 88)  BP: 93/65 (09-06-23 @ 16:31) (72/52 - 93/65)  BP(mean): --  RR: 16 (09-06-23 @ 04:43) (16 - 18)  SpO2: 100% (09-06-23 @ 04:43) (99% - 100%)      Appearance: Normal awake alert in good spirits  HEENT:   Normal oral mucosa, PERRL, EOMI	  Cardiovascular: Normal S1 S2, irregularly irregular moderate JVD, 1/6 murmur at apex,  Respiratory: Lungs clear to auscultation, normal effort 	  Gastrointestinal:  Soft, Non-tender, + BS	  Skin: No rashes, No ecchymoses, No cyanosis, warm to touch  Musculoskeletal: Normal range of motion, normal strength  Psychiatry:  Mood & affect appropriate  Ext: 1+ edema (decreased)        LABS:    CARDIAC MARKERS:        LABS:    CARDIAC MARKERS:                                11.1   5.22  )-----------( 160      ( 06 Sep 2023 05:44 )             33.3     09-06    128<L>  |  85<L>  |  14  ----------------------------<  107<H>  3.6   |  32<H>  |  0.63    Ca    9.8      06 Sep 2023 05:43  Phos  3.7     09-05  Mg     1.9     09-05    TPro  6.7  /  Alb  4.2  /  TBili  2.0<H>  /  DBili  x   /  AST  22  /  ALT  11  /  AlkPhos  90  09-06    proBNP:   Lipid Profile:   HgA1c:   TSH:     < from: TTE W or WO Ultrasound Enhancing Agent (08.31.23 @ 10:35) >  CONCLUSIONS:      1. Moderately dilated left ventricular cavity size. Left ventricular wall thickness is normal. Left ventricular systolic function is severely decreased with an ejection fraction of 20 % by Ramon's method of disks. There is global left ventricular hypokinesis.   2. Severely enlarged right ventricular cavity size, normal right ventricular wall thickness and normal right ventricular systolic function.   3. An annuloplasty ring is noted in the mitral position.   4. Severe mitral regurgitation.   5. Percutaneous tricuspid valve in previous surgical valve.   6. Pulmonic valve was not well visualized.                    TELEMETRY: 	    ECG:afib RBBB  	  RADIOLOGY:   DIAGNOSTIC TESTING:  [ ] Echocardiogram:  [ ]  Catheterization:  [ ] Stress Test:    OTHER:

## 2023-09-06 NOTE — PROVIDER CONTACT NOTE (OTHER) - ACTION/TREATMENT ORDERED:
as per Night [facility] Woods - hold all medications metoprolol, Entresto, torsemide due to low BP 91/60. PA will address to day team

## 2023-09-06 NOTE — PROGRESS NOTE ADULT - PROBLEM SELECTOR PLAN 2
- bp low--80s systolic   - hold torsemide and entresto for now, give NS 50 cc/ hr x 5 hours  - c/w spironolactone and metoprolol

## 2023-09-06 NOTE — PROGRESS NOTE ADULT - ASSESSMENT
Patient weight now at 140 with addition of metalazone. Still anemic and now hypokalemicbut hemodynamically stable    1. Right heart failure/left heart failure right greater then left- improved  2. good weight los with addition of zaroxylyn  3. afib VR controlled  4. hypokalemic-improved hyponatremia  5. hypotension secondary to overdiuresiss    Recommend  1. check iron stores  2. change to Po torsemide 20 BID  3.hold zaroxylyn  4. replace postasium  5. hold entresto 24-26 BID  6. OOB and ambulate

## 2023-09-07 LAB
ANION GAP SERPL CALC-SCNC: 12 MMOL/L — SIGNIFICANT CHANGE UP (ref 5–17)
APTT BLD: 42.3 SEC — HIGH (ref 24.5–35.6)
BUN SERPL-MCNC: 11 MG/DL — SIGNIFICANT CHANGE UP (ref 7–23)
CALCIUM SERPL-MCNC: 8.7 MG/DL — SIGNIFICANT CHANGE UP (ref 8.4–10.5)
CHLORIDE SERPL-SCNC: 89 MMOL/L — LOW (ref 96–108)
CO2 SERPL-SCNC: 28 MMOL/L — SIGNIFICANT CHANGE UP (ref 22–31)
CREAT SERPL-MCNC: 0.5 MG/DL — SIGNIFICANT CHANGE UP (ref 0.5–1.3)
EGFR: 111 ML/MIN/1.73M2 — SIGNIFICANT CHANGE UP
GLUCOSE SERPL-MCNC: 102 MG/DL — HIGH (ref 70–99)
INR BLD: 2.18 RATIO — HIGH (ref 0.85–1.18)
MAGNESIUM SERPL-MCNC: 1.7 MG/DL — SIGNIFICANT CHANGE UP (ref 1.6–2.6)
POTASSIUM SERPL-MCNC: 3.3 MMOL/L — LOW (ref 3.5–5.3)
POTASSIUM SERPL-SCNC: 3.3 MMOL/L — LOW (ref 3.5–5.3)
PROTHROM AB SERPL-ACNC: 23.4 SEC — HIGH (ref 9.5–13)
SODIUM SERPL-SCNC: 129 MMOL/L — LOW (ref 135–145)

## 2023-09-07 PROCEDURE — 99232 SBSQ HOSP IP/OBS MODERATE 35: CPT

## 2023-09-07 PROCEDURE — 93321 DOPPLER ECHO F-UP/LMTD STD: CPT | Mod: 26

## 2023-09-07 PROCEDURE — 93308 TTE F-UP OR LMTD: CPT | Mod: 26

## 2023-09-07 RX ORDER — WARFARIN SODIUM 2.5 MG/1
7.5 TABLET ORAL ONCE
Refills: 0 | Status: COMPLETED | OUTPATIENT
Start: 2023-09-07 | End: 2023-09-07

## 2023-09-07 RX ORDER — POTASSIUM CHLORIDE 20 MEQ
40 PACKET (EA) ORAL EVERY 4 HOURS
Refills: 0 | Status: COMPLETED | OUTPATIENT
Start: 2023-09-07 | End: 2023-09-07

## 2023-09-07 RX ORDER — SACUBITRIL AND VALSARTAN 24; 26 MG/1; MG/1
1 TABLET, FILM COATED ORAL
Refills: 0 | Status: DISCONTINUED | OUTPATIENT
Start: 2023-09-07 | End: 2023-09-08

## 2023-09-07 RX ADMIN — WARFARIN SODIUM 7.5 MILLIGRAM(S): 2.5 TABLET ORAL at 22:39

## 2023-09-07 RX ADMIN — Medication 40 MILLIEQUIVALENT(S): at 18:04

## 2023-09-07 RX ADMIN — Medication 50 MILLIGRAM(S): at 05:19

## 2023-09-07 RX ADMIN — SPIRONOLACTONE 25 MILLIGRAM(S): 25 TABLET, FILM COATED ORAL at 05:19

## 2023-09-07 RX ADMIN — Medication 40 MILLIEQUIVALENT(S): at 22:40

## 2023-09-07 RX ADMIN — SACUBITRIL AND VALSARTAN 1 TABLET(S): 24; 26 TABLET, FILM COATED ORAL at 22:39

## 2023-09-07 RX ADMIN — Medication 20 MILLIGRAM(S): at 18:05

## 2023-09-07 NOTE — PROGRESS NOTE ADULT - SUBJECTIVE AND OBJECTIVE BOX
Subjective: Patient seen and examined. No new events except as noted.   Feels well no sob or cp  walking without difficulty  CT abdomen benign renal lesion  weight 130  BP improved  MEDICATIONS:  MEDICATIONS  (STANDING):  metoprolol succinate ER 50 milliGRAM(s) Oral daily  potassium chloride    Tablet ER 40 milliEquivalent(s) Oral once  sacubitril 24 mG/valsartan 26 mG 1 Tablet(s) Oral two times a day  sodium chloride 0.9%. 1000 milliLiter(s) (50 mL/Hr) IV Continuous <Continuous>  spironolactone 25 milliGRAM(s) Oral two times a day  Tirosint (levothyroxine) 175 mcg 1 Capsule(s) 1 Capsule(s) Oral <User Schedule>  torsemide 20 milliGRAM(s) Oral every 12 hours  warfarin 7.5 milliGRAM(s) Oral once      PHYSICAL EXAM:  T(C): 36.7 (09-07-23 @ 07:29), Max: 36.8 (09-06-23 @ 20:17)  HR: 72 (09-07-23 @ 07:29) (72 - 98)  BP: 93/61 (09-07-23 @ 07:29) (88/56 - 102/73)  RR: 17 (09-07-23 @ 07:29) (16 - 17)  SpO2: 96% (09-07-23 @ 07:29) (96% - 98%)  Wt(kg): --  I&O's Summary    06 Sep 2023 07:01  -  07 Sep 2023 07:00  --------------------------------------------------------  IN: 780 mL / OUT: 100 mL / NET: 680 mL    07 Sep 2023 07:01  -  07 Sep 2023 13:36  --------------------------------------------------------  IN: 240 mL / OUT: 200 mL / NET: 40 mL          Appearance: Normal looks well heathy	  HEENT:   Normal oral mucosa, PERRL, EOMI	  Cardiovascular: Normal S1 S2, irregularly irregular moderate JVDRespiratory: Lungs clear to auscultation, normal effort 	  Gastrointestinal:  Soft, Non-tender, + BS	  Skin: No rashes, No ecchymoses, No cyanosis, warm to touch  Musculoskeletal: Normal range of motion, normal strength  Psychiatry:  Mood & affect appropriate  Ext: No pitting edema  Peripheral pulses palpable 2+ bilaterally      LABS:    CARDIAC MARKERS:                                11.1   5.22  )-----------( 160      ( 06 Sep 2023 05:44 )             33.3     09-07    129<L>  |  89<L>  |  11  ----------------------------<  102<H>  3.3<L>   |  28  |  0.50    Ca    8.7      07 Sep 2023 07:22  Mg     1.7     09-07    TPro  6.7  /  Alb  4.2  /  TBili  2.0<H>  /  DBili  x   /  AST  22  /  ALT  11  /  AlkPhos  90  09-06    proBNP:   Lipid Profile:   HgA1c:   TSH:           TELEMETRY: 	    ECG:  	afib RBBB  RADIOLOGY:   < from: CT Angio Abdomen and Pelvis w/ IV Cont (09.06.23 @ 14:34) >  PROCEDURE:  CT of the Abdomen and Pelvis was performed.  Precontrast, Arterial and Delayed phases were acquired.  Sagittal and coronal reformats were performed.    FINDINGS:  LOWER CHEST: Median sternotomy wires noted. Cardiomegaly. Mitral and   tricuspid valve replacement.    LIVER: Cirrhotic morphology.  BILE DUCTS: Normal caliber.  GALLBLADDER: Contracted gallbladder  SPLEEN: Within normal limits.  PANCREAS: Within normal limits.  ADRENALS: Within normal limits.  KIDNEYS/URETERS: Multiple bilateral renal cysts the largest measuring 8.0   x 5.3 x 6.1cm (TRV x AP x CC) right renal cyst. Heterogenous left renal   nodule with fat and soft tissue density measuring 2.1 cm (series 5 image   56) increased from 1.7 cm seen on prior MR dated 3/13/2023.  BLADDER: Within normal limits.  REPRODUCTIVE ORGANS: Uterus and adnexa within normal limits.    BOWEL: No bowel obstruction. Appendix is normal. Large stool burden.  PERITONEUM: No ascites.  VESSELS: Within normal limits.  RETROPERITONEUM/LYMPH NODES: No lymphadenopathy.  ABDOMINAL WALL: Within normal limits.  BONES: Degenerative changes.    IMPRESSION:  2 cm lesion in the medial aspect of the left kidney consistent with   angiomyolipoma.          --- End of Report

## 2023-09-07 NOTE — PROGRESS NOTE ADULT - ASSESSMENT
Patient weight now at 140 with addition of metalazone. Still anemic and now hypokalemicbut hemodynamically stable    1. Right heart failure/left heart failure right greater then left- improved  2. good weight los with addition of zaroxylyn  3. afib VR controlled  4. hypokalemic-improved hyponatremia  5. hypotension secondary to overdiuresiss-now BP better    Recommend  1. check iron stores  2. Po torsemide 20 BID  3.d/c  zaroxylyn  4. replace postasium  5. restart  entresto 24-26 BID if BP falls discontinue  6. OOB and ambulate  7. discharge planning for AM

## 2023-09-07 NOTE — PROGRESS NOTE ADULT - SUBJECTIVE AND OBJECTIVE BOX
no events overnight.    GENERAL: No fevers, no chills.  EYES: No blurry vision,  No photophobia  ENT: No sore throat.  No dysphagia  Cardiovascular: No chest pain, palpitations, orthopnea  Pulmonary: No cough, no wheezing. No shortness of breath  Gastrointestinal: No abdominal pain, no diarrhea, no constipation.    Musculoskeletal: No weakness.  No myalgias.  Dermatology:  No rashes.  Neuro: No Headache.  No vertigo.  No dizziness.  Psych: No anxiety, no depression.  Denies suicidal thoughts.    MEDICATIONS  (STANDING):  metoprolol succinate ER 50 milliGRAM(s) Oral daily  potassium chloride    Tablet ER 40 milliEquivalent(s) Oral once  sacubitril 24 mG/valsartan 26 mG 1 Tablet(s) Oral two times a day  sodium chloride 0.9%. 1000 milliLiter(s) (50 mL/Hr) IV Continuous <Continuous>  spironolactone 25 milliGRAM(s) Oral two times a day  Tirosint (levothyroxine) 175 mcg 1 Capsule(s) 1 Capsule(s) Oral <User Schedule>  torsemide 20 milliGRAM(s) Oral every 12 hours  warfarin 7.5 milliGRAM(s) Oral once    MEDICATIONS  (PRN):  acetaminophen     Tablet .. 650 milliGRAM(s) Oral every 6 hours PRN Temp greater or equal to 38C (100.4F), Mild Pain (1 - 3)  aluminum hydroxide/magnesium hydroxide/simethicone Suspension 30 milliLiter(s) Oral every 4 hours PRN Dyspepsia  melatonin 3 milliGRAM(s) Oral at bedtime PRN Insomnia  ondansetron Injectable 4 milliGRAM(s) IV Push every 8 hours PRN Nausea and/or Vomiting    Vital Signs Last 24 Hrs  T(C): 36.6 (07 Sep 2023 04:06), Max: 36.8 (06 Sep 2023 20:17)  T(F): 97.9 (07 Sep 2023 04:06), Max: 98.2 (06 Sep 2023 20:17)  HR: 91 (07 Sep 2023 04:06) (73 - 98)  BP: 102/73 (07 Sep 2023 04:06) (88/56 - 102/73)  BP(mean): --  RR: 16 (07 Sep 2023 04:06) (16 - 16)  SpO2: 97% (07 Sep 2023 04:06) (97% - 98%)    Parameters below as of 07 Sep 2023 04:06  Patient On (Oxygen Delivery Method): room air    CONSTITUTIONAL: NAD  RESPIRATORY: Normal respiratory effort; lungs are clear to auscultation bilaterally  CARDIOVASCULAR: irregularly irregular rhythm, normal rate normal S1/S2, no murmurs. +2 pitting lower extremity edema, +JVD   ABDOMEN: Nontender to palpation, normoactive bowel sounds, no rebound/guarding; No hepatosplenomegaly  MUSCULOSKELETAL no clubbing or cyanosis of digits; no joint swelling or tenderness to palpation  NEURO: No focal deficits, upper/lower motor strength grossly intact bilaterally   EXT: Warm, well perfused   PSYCH: A+O to person, place, and time; affect appropriate    .  LABS:                         11.1   5.22  )-----------( 160      ( 06 Sep 2023 05:44 )             33.3     09-07    129<L>  |  89<L>  |  11  ----------------------------<  102<H>  3.3<L>   |  28  |  0.50    Ca    8.7      07 Sep 2023 07:22  Mg     1.7     09-07    TPro  6.7  /  Alb  4.2  /  TBili  2.0<H>  /  DBili  x   /  AST  22  /  ALT  11  /  AlkPhos  90  09-06    PT/INR - ( 07 Sep 2023 07:22 )   PT: 23.4 sec;   INR: 2.18 ratio         PTT - ( 07 Sep 2023 07:22 )  PTT:42.3 sec  Urinalysis Basic - ( 07 Sep 2023 07:22 )    Color: x / Appearance: x / SG: x / pH: x  Gluc: 102 mg/dL / Ketone: x  / Bili: x / Urobili: x   Blood: x / Protein: x / Nitrite: x   Leuk Esterase: x / RBC: x / WBC x   Sq Epi: x / Non Sq Epi: x / Bacteria: x            RADIOLOGY, EKG & ADDITIONAL TESTS: Reviewed.

## 2023-09-08 ENCOUNTER — TRANSCRIPTION ENCOUNTER (OUTPATIENT)
Age: 53
End: 2023-09-08

## 2023-09-08 LAB
ANION GAP SERPL CALC-SCNC: 13 MMOL/L — SIGNIFICANT CHANGE UP (ref 5–17)
APTT BLD: 43.6 SEC — HIGH (ref 24.5–35.6)
BUN SERPL-MCNC: 15 MG/DL — SIGNIFICANT CHANGE UP (ref 7–23)
CALCIUM SERPL-MCNC: 8.8 MG/DL — SIGNIFICANT CHANGE UP (ref 8.4–10.5)
CHLORIDE SERPL-SCNC: 90 MMOL/L — LOW (ref 96–108)
CO2 SERPL-SCNC: 29 MMOL/L — SIGNIFICANT CHANGE UP (ref 22–31)
CREAT SERPL-MCNC: 0.63 MG/DL — SIGNIFICANT CHANGE UP (ref 0.5–1.3)
EGFR: 105 ML/MIN/1.73M2 — SIGNIFICANT CHANGE UP
GLUCOSE SERPL-MCNC: 133 MG/DL — HIGH (ref 70–99)
HCT VFR BLD CALC: 32.4 % — LOW (ref 34.5–45)
HGB BLD-MCNC: 10.5 G/DL — LOW (ref 11.5–15.5)
INR BLD: 2.2 RATIO — HIGH (ref 0.85–1.18)
MAGNESIUM SERPL-MCNC: 1.8 MG/DL — SIGNIFICANT CHANGE UP (ref 1.6–2.6)
MCHC RBC-ENTMCNC: 32.4 GM/DL — SIGNIFICANT CHANGE UP (ref 32–36)
MCHC RBC-ENTMCNC: 32.6 PG — SIGNIFICANT CHANGE UP (ref 27–34)
MCV RBC AUTO: 100.6 FL — HIGH (ref 80–100)
NRBC # BLD: 0 /100 WBCS — SIGNIFICANT CHANGE UP (ref 0–0)
PLATELET # BLD AUTO: 156 K/UL — SIGNIFICANT CHANGE UP (ref 150–400)
POTASSIUM SERPL-MCNC: 3.7 MMOL/L — SIGNIFICANT CHANGE UP (ref 3.5–5.3)
POTASSIUM SERPL-SCNC: 3.7 MMOL/L — SIGNIFICANT CHANGE UP (ref 3.5–5.3)
PROTHROM AB SERPL-ACNC: 22.6 SEC — HIGH (ref 9.5–13)
RBC # BLD: 3.22 M/UL — LOW (ref 3.8–5.2)
RBC # FLD: 15.6 % — HIGH (ref 10.3–14.5)
SODIUM SERPL-SCNC: 132 MMOL/L — LOW (ref 135–145)
WBC # BLD: 4.04 K/UL — SIGNIFICANT CHANGE UP (ref 3.8–10.5)
WBC # FLD AUTO: 4.04 K/UL — SIGNIFICANT CHANGE UP (ref 3.8–10.5)

## 2023-09-08 PROCEDURE — 99232 SBSQ HOSP IP/OBS MODERATE 35: CPT

## 2023-09-08 RX ORDER — POTASSIUM CHLORIDE 20 MEQ
40 PACKET (EA) ORAL ONCE
Refills: 0 | Status: COMPLETED | OUTPATIENT
Start: 2023-09-08 | End: 2023-09-08

## 2023-09-08 RX ORDER — SODIUM CHLORIDE 9 MG/ML
1000 INJECTION INTRAMUSCULAR; INTRAVENOUS; SUBCUTANEOUS
Refills: 0 | Status: DISCONTINUED | OUTPATIENT
Start: 2023-09-08 | End: 2023-09-09

## 2023-09-08 RX ORDER — WARFARIN SODIUM 2.5 MG/1
7.5 TABLET ORAL ONCE
Refills: 0 | Status: COMPLETED | OUTPATIENT
Start: 2023-09-08 | End: 2023-09-08

## 2023-09-08 RX ADMIN — Medication 40 MILLIEQUIVALENT(S): at 17:39

## 2023-09-08 RX ADMIN — SODIUM CHLORIDE 50 MILLILITER(S): 9 INJECTION INTRAMUSCULAR; INTRAVENOUS; SUBCUTANEOUS at 09:32

## 2023-09-08 RX ADMIN — Medication 40 MILLIEQUIVALENT(S): at 17:30

## 2023-09-08 RX ADMIN — Medication 20 MILLIGRAM(S): at 05:46

## 2023-09-08 RX ADMIN — SODIUM CHLORIDE 100 MILLILITER(S): 9 INJECTION INTRAMUSCULAR; INTRAVENOUS; SUBCUTANEOUS at 11:35

## 2023-09-08 RX ADMIN — WARFARIN SODIUM 7.5 MILLIGRAM(S): 2.5 TABLET ORAL at 21:39

## 2023-09-08 RX ADMIN — Medication 50 MILLIGRAM(S): at 05:46

## 2023-09-08 NOTE — PROGRESS NOTE ADULT - SUBJECTIVE AND OBJECTIVE BOX
Subjective: Patient seen and examined. No new events except as noted.   Patient becam hypotensive on entresto discontinued  feels well NAD  MEDICATIONS:  MEDICATIONS  (STANDING):  metoprolol succinate ER 50 milliGRAM(s) Oral daily  potassium chloride    Tablet ER 40 milliEquivalent(s) Oral once  sodium chloride 0.9%. 1000 milliLiter(s) (50 mL/Hr) IV Continuous <Continuous>  sodium chloride 0.9%. 1000 milliLiter(s) (100 mL/Hr) IV Continuous <Continuous>  spironolactone 25 milliGRAM(s) Oral two times a day  Tirosint (levothyroxine) 175 mcg 1 Capsule(s) 1 Capsule(s) Oral <User Schedule>  torsemide 20 milliGRAM(s) Oral every 12 hours  warfarin 7.5 milliGRAM(s) Oral once      PHYSICAL EXAM:  T(C): 36.4 (09-08-23 @ 04:02), Max: 36.8 (09-07-23 @ 20:00)  HR: 76 (09-08-23 @ 13:00) (76 - 88)  BP: 86/55 (09-08-23 @ 13:00) (74/47 - 115/66)  RR: 16 (09-08-23 @ 04:02) (16 - 16)  SpO2: 100% (09-08-23 @ 04:02) (97% - 100%)  Wt(kg): --  I&O's Summary    07 Sep 2023 07:01  -  08 Sep 2023 07:00  --------------------------------------------------------  IN: 960 mL / OUT: 400 mL / NET: 560 mL    08 Sep 2023 07:01  -  08 Sep 2023 15:55  --------------------------------------------------------  IN: 240 mL / OUT: 0 mL / NET: 240 mL          Appearance: Normal looks well healthy warm well perfused	  HEENT:   Normal oral mucosa, PERRL, EOMI	  Cardiovascular: Normal S1 S2, irregularly irregular moderate JVD, 1-2/6 murmur at apex   Respiratory: Lungs clear to auscultation, normal effort 	  Gastrointestinal:  Soft, Non-tender, + BS	  Skin: No rashes, No ecchymoses, No cyanosis, warm to touch  Musculoskeletal: Normal range of motion, normal strength  Psychiatry:  Mood & affect appropriate  Ext: No significnat edema edema        LABS:    CARDIAC MARKERS:                                10.5   4.04  )-----------( 156      ( 08 Sep 2023 11:59 )             32.4     09-08    132<L>  |  90<L>  |  15  ----------------------------<  133<H>  3.7   |  29  |  0.63    Ca    8.8      08 Sep 2023 09:29  Mg     1.8     09-08      proBNP:   Lipid Profile:   HgA1c:   TSH:           TELEMETRY: 	    ECG:  	  RADIOLOGY:   DIAGNOSTIC TESTING:  [ ] Echocardiogram:  < from: TTE Limited W or WO Ultrasound Enhancing Agent (09.07.23 @ 09:34) >  CONCLUSIONS:      1. Left ventricular systolic function is severely decreased with an ejection fraction visually estimated at 20 to 25 %.   2. Moderately enlarged right ventricular cavity size.   3. Moderate to severe mitral intravalvular regurgitation.   4. Transcatheter heart valve inside valve-in-valve in the tricuspid position. The tricuspid valve peak gradient is 4.0 mmHg. The tricuspid valve mean gradient is 2.9 mmHg. There is trace tricuspid regurgitation.   5. Compared to the transthoracic echocardiogram performed on 8/31/2023 LV and RV size and function are relatively unchanged.The mean gradient through the mitral valve was not performed hence unable to determine any change in gradient.    ________________________________________________________________________________________  FINDINGS:     Left Ventricle:  Left ventricular systolic function is severely decreased with an ejection fraction visually estimated at 20 to 25%. There is global left ventricular hypokinesis.     Right Ventricle:  There is akinesis of the right ventricular free wall. Moderately enlarged right ventricular cavity size and reduced right ventricular systolic function.     Left Atrium:  The left atrium is severely dilated in size.     Right Atrium:  The right atrium is severely dilated in size.     Mitral Valve:  Moderate to severe mitral intravalvular regurgitation. There is no paravalvular regurgitation.     Tricuspid Valve:  Transcatheter heart valve inside valve-in-valve in the tricuspid position. The tricuspid valve peak gradient is 4.0 mmHg. The tricuspid valve mean gradient is 2.9 mmHg. There is trace tricuspid regurgitation.  ____________________________________________________________________  Quantitative Data:  Left Ventricle Measurements: (Indexed to BSA)     Visualized LV EF%: 20 to 25%       LVOT / RVOT/ Qp/Qs Data: (Indexedto BSA)     MR Vmax:           4.27 m/s  MR VTI:            128.49 cm  MR Peak Gradient:  72.9 mmHg     MR PISA Radius:    0.70 cm  MR Aliasing Atilio:   35.00 cm/s  MR EROA:           0.3 cm²  MR Reg Vol (PISA): 32 ml/beat       Tricuspid Valve Measurements:     TV Vmax:          1.0 m/s  TV Peak Gradient: 4.0 mmHg  TV Mean Gradient: 2.9 mmHg    ________________________________________________________________________________________  Electronically signed on 9/7/2023 at 6:06:04 PM by Shirlene Sanderson

## 2023-09-08 NOTE — PROGRESS NOTE ADULT - ASSESSMENT
Patient weight now at 140 with addition of metalazone. Still anemic and now hypokalemicbut hemodynamically stable    1. Right heart failure/left heart failure right greater then left- improved  2. good weight los with addition of zaroxylyn  3. afib VR controlled  4. hypokalemic-improved hyponatremia  5. hypotension secondary to overdiuresiss-entresto discontinued  6.2 D echo unchanged    Recommend  1. check iron stores  2. Po torsemide 20 BID  3. replace postasium  4. discharge today no entresto

## 2023-09-08 NOTE — PROVIDER CONTACT NOTE (OTHER) - SITUATION
pt hypotensive, Blood pressure 85/55, Heart rate 83, oxygen 99%on room air
BP 91/60, pt due for metoprolol, Entresto, torsemide.
Patients BP 74/47. Taken electronically on both arms and manually, patients remained hypotensive.
BP running low with systolic in 70s-80s

## 2023-09-08 NOTE — PROGRESS NOTE ADULT - SUBJECTIVE AND OBJECTIVE BOX
no events overnight.    GENERAL: No fevers, no chills.  EYES: No blurry vision,  No photophobia  ENT: No sore throat.  No dysphagia  Cardiovascular: No chest pain, palpitations, orthopnea  Pulmonary: No cough, no wheezing. No shortness of breath  Gastrointestinal: No abdominal pain, no diarrhea, no constipation.    Musculoskeletal: No weakness.  No myalgias.  Dermatology:  No rashes.  Neuro: No Headache.  No vertigo.  No dizziness.  Psych: No anxiety, no depression.  Denies suicidal thoughts.    MEDICATIONS  (STANDING):  metoprolol succinate ER 50 milliGRAM(s) Oral daily  potassium chloride    Tablet ER 40 milliEquivalent(s) Oral once  sodium chloride 0.9%. 1000 milliLiter(s) (100 mL/Hr) IV Continuous <Continuous>  sodium chloride 0.9%. 1000 milliLiter(s) (50 mL/Hr) IV Continuous <Continuous>  spironolactone 25 milliGRAM(s) Oral two times a day  Tirosint (levothyroxine) 175 mcg 1 Capsule(s) 1 Capsule(s) Oral <User Schedule>  torsemide 20 milliGRAM(s) Oral every 12 hours    MEDICATIONS  (PRN):  acetaminophen     Tablet .. 650 milliGRAM(s) Oral every 6 hours PRN Temp greater or equal to 38C (100.4F), Mild Pain (1 - 3)  aluminum hydroxide/magnesium hydroxide/simethicone Suspension 30 milliLiter(s) Oral every 4 hours PRN Dyspepsia  melatonin 3 milliGRAM(s) Oral at bedtime PRN Insomnia  ondansetron Injectable 4 milliGRAM(s) IV Push every 8 hours PRN Nausea and/or Vomiting    Vital Signs Last 24 Hrs  T(C): 36.4 (08 Sep 2023 04:02), Max: 36.8 (07 Sep 2023 20:00)  T(F): 97.6 (08 Sep 2023 04:02), Max: 98.3 (07 Sep 2023 20:00)  HR: 77 (08 Sep 2023 09:01) (76 - 88)  BP: 74/47 (08 Sep 2023 09:01) (74/47 - 115/66)  BP(mean): --  RR: 16 (08 Sep 2023 04:02) (16 - 16)  SpO2: 100% (08 Sep 2023 04:02) (97% - 100%)    Parameters below as of 08 Sep 2023 04:02  Patient On (Oxygen Delivery Method): room air    CONSTITUTIONAL: NAD  RESPIRATORY: Normal respiratory effort; lungs are clear to auscultation bilaterally  CARDIOVASCULAR: irregularly irregular rhythm, normal rate normal S1/S2, no murmurs. +2 pitting lower extremity edema, +JVD   ABDOMEN: Nontender to palpation, normoactive bowel sounds, no rebound/guarding; No hepatosplenomegaly  MUSCULOSKELETAL no clubbing or cyanosis of digits; no joint swelling or tenderness to palpation  NEURO: No focal deficits, upper/lower motor strength grossly intact bilaterally   EXT: Warm, well perfused   PSYCH: A+O to person, place, and time; affect appropriate    .  LABS:     09-08    132<L>  |  90<L>  |  15  ----------------------------<  133<H>  3.7   |  29  |  0.63    Ca    8.8      08 Sep 2023 09:29  Mg     1.8     09-08      PT/INR - ( 08 Sep 2023 09:29 )   PT: 22.6 sec;   INR: 2.20 ratio         PTT - ( 08 Sep 2023 09:29 )  PTT:43.6 sec  Urinalysis Basic - ( 08 Sep 2023 09:29 )    Color: x / Appearance: x / SG: x / pH: x  Gluc: 133 mg/dL / Ketone: x  / Bili: x / Urobili: x   Blood: x / Protein: x / Nitrite: x   Leuk Esterase: x / RBC: x / WBC x   Sq Epi: x / Non Sq Epi: x / Bacteria: x            RADIOLOGY, EKG & ADDITIONAL TESTS: Reviewed.

## 2023-09-08 NOTE — DISCHARGE NOTE NURSING/CASE MANAGEMENT/SOCIAL WORK - PATIENT PORTAL LINK FT
You can access the FollowMyHealth Patient Portal offered by [facility] by registering at the following website: [email or web address]. By joining [facility]’s FollowMyHealth portal, you will also be able to view your health information using other applications (apps) compatible with our system.

## 2023-09-09 ENCOUNTER — TRANSCRIPTION ENCOUNTER (OUTPATIENT)
Age: 53
End: 2023-09-09

## 2023-09-09 LAB
ANION GAP SERPL CALC-SCNC: 12 MMOL/L — SIGNIFICANT CHANGE UP (ref 5–17)
APTT BLD: 43 SEC — HIGH (ref 24.5–35.6)
BUN SERPL-MCNC: 19 MG/DL — SIGNIFICANT CHANGE UP (ref 7–23)
CALCIUM SERPL-MCNC: 8.9 MG/DL — SIGNIFICANT CHANGE UP (ref 8.4–10.5)
CHLORIDE SERPL-SCNC: 93 MMOL/L — LOW (ref 96–108)
CO2 SERPL-SCNC: 27 MMOL/L — SIGNIFICANT CHANGE UP (ref 22–31)
CREAT SERPL-MCNC: 0.62 MG/DL — SIGNIFICANT CHANGE UP (ref 0.5–1.3)
EGFR: 106 ML/MIN/1.73M2 — SIGNIFICANT CHANGE UP
GLUCOSE SERPL-MCNC: 103 MG/DL — HIGH (ref 70–99)
HCT VFR BLD CALC: 33.5 % — LOW (ref 34.5–45)
HGB BLD-MCNC: 10.8 G/DL — LOW (ref 11.5–15.5)
INR BLD: 1.87 RATIO — HIGH (ref 0.85–1.18)
MCHC RBC-ENTMCNC: 32.2 GM/DL — SIGNIFICANT CHANGE UP (ref 32–36)
MCHC RBC-ENTMCNC: 32.9 PG — SIGNIFICANT CHANGE UP (ref 27–34)
MCV RBC AUTO: 102.1 FL — HIGH (ref 80–100)
NRBC # BLD: 0 /100 WBCS — SIGNIFICANT CHANGE UP (ref 0–0)
PLATELET # BLD AUTO: 155 K/UL — SIGNIFICANT CHANGE UP (ref 150–400)
POTASSIUM SERPL-MCNC: 3.7 MMOL/L — SIGNIFICANT CHANGE UP (ref 3.5–5.3)
POTASSIUM SERPL-SCNC: 3.7 MMOL/L — SIGNIFICANT CHANGE UP (ref 3.5–5.3)
PROTHROM AB SERPL-ACNC: 20.2 SEC — HIGH (ref 9.5–13)
RBC # BLD: 3.28 M/UL — LOW (ref 3.8–5.2)
RBC # FLD: 15.4 % — HIGH (ref 10.3–14.5)
SODIUM SERPL-SCNC: 132 MMOL/L — LOW (ref 135–145)
WBC # BLD: 4.02 K/UL — SIGNIFICANT CHANGE UP (ref 3.8–10.5)
WBC # FLD AUTO: 4.02 K/UL — SIGNIFICANT CHANGE UP (ref 3.8–10.5)

## 2023-09-09 PROCEDURE — 99239 HOSP IP/OBS DSCHRG MGMT >30: CPT

## 2023-09-09 RX ORDER — POTASSIUM CHLORIDE 20 MEQ
40 PACKET (EA) ORAL ONCE
Refills: 0 | Status: COMPLETED | OUTPATIENT
Start: 2023-09-09 | End: 2023-09-09

## 2023-09-09 RX ORDER — WARFARIN SODIUM 2.5 MG/1
7.5 TABLET ORAL ONCE
Refills: 0 | Status: COMPLETED | OUTPATIENT
Start: 2023-09-09 | End: 2023-09-09

## 2023-09-09 RX ADMIN — WARFARIN SODIUM 7.5 MILLIGRAM(S): 2.5 TABLET ORAL at 22:40

## 2023-09-09 RX ADMIN — Medication 20 MILLIGRAM(S): at 05:34

## 2023-09-09 RX ADMIN — Medication 50 MILLIGRAM(S): at 09:17

## 2023-09-09 RX ADMIN — Medication 40 MILLIEQUIVALENT(S): at 11:51

## 2023-09-09 NOTE — DISCHARGE NOTE PROVIDER - NSDCCPCAREPLAN_GEN_ALL_CORE_FT
PRINCIPAL DISCHARGE DIAGNOSIS  Diagnosis: Acute on chronic right heart failure  Assessment and Plan of Treatment: Weigh yourself daily.  If you gain 3lbs in 3 days, or 5lbs in a week call your Health Care Provider.  Do not eat or drink foods containing more than 2000mg of salt (sodium) in your diet every day.  Call your Health Care Provider if you have any swelling or increased swelling in your feet, ankles, and/or stomach.  Take all of your medication as directed.  If you become dizzy call your Health Care Provider.        SECONDARY DISCHARGE DIAGNOSES  Diagnosis: Hypertension  Assessment and Plan of Treatment: Low salt diet  Activity as tolerated.  Take all medication as prescribed.  Follow up with your medical doctor for routine blood pressure monitoring at your next visit.  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 Follow up outpatient.    Diagnosis: Chronic atrial fibrillation  Assessment and Plan of Treatment: Atrial fibrillation is the most common heart rhythm problem & has the risk of stroke & heart attack  It helps if you control your blood pressure, not drink more than 1-2 alcohol drinks per day, cut down on caffeine, getting treatment for over active thyroid gland, & getting exercise  Call your doctor if you feel your heart racing or beating unusually, chest tightness or pain, lightheaded, faint, shortness of breath especially with exercise  It is important to take your heart medication as prescribed  You may be on anticoagulation which is very important to take as directed - you may need blood work to monitor drug levels       PRINCIPAL DISCHARGE DIAGNOSIS  Diagnosis: Acute on chronic right heart failure  Assessment and Plan of Treatment: Given hypotension during hospital course metolazone currently on hold, can follow up bladimir Gale for future managemene.   Weigh yourself daily.  If you gain 3lbs in 3 days, or 5lbs in a week call your Health Care Provider.  Do not eat or drink foods containing more than 2000mg of salt (sodium) in your diet every day.  Call your Health Care Provider if you have any swelling or increased swelling in your feet, ankles, and/or stomach.  Take all of your medication as directed.  If you become dizzy call your Health Care Provider.        SECONDARY DISCHARGE DIAGNOSES  Diagnosis: Chronic atrial fibrillation  Assessment and Plan of Treatment: Atrial fibrillation is the most common heart rhythm problem & has the risk of stroke & heart attack  It helps if you control your blood pressure, not drink more than 1-2 alcohol drinks per day, cut down on caffeine, getting treatment for over active thyroid gland, & getting exercise  Call your doctor if you feel your heart racing or beating unusually, chest tightness or pain, lightheaded, faint, shortness of breath especially with exercise  It is important to take your heart medication as prescribed  continue with coumadine and INR checks      Diagnosis: Hypertension  Assessment and Plan of Treatment: Low salt diet  Activity as tolerated.  Take all medication as prescribed.  Follow up with your medical doctor for routine blood pressure monitoring at your next visit.  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 Follow up outpatient.

## 2023-09-09 NOTE — PROGRESS NOTE ADULT - PROVIDER SPECIALTY LIST ADULT
Cardiology
Hospitalist
Internal Medicine
Internal Medicine
Hospitalist
Hospitalist

## 2023-09-09 NOTE — PROGRESS NOTE ADULT - PROBLEM SELECTOR PROBLEM 4
Hypothyroid
Hypothyroid
Anemia
Renal mass of unknown nature
Anemia
Hypertension
Anemia
Hypothyroid

## 2023-09-09 NOTE — DISCHARGE NOTE PROVIDER - NSDCFUADDAPPT_GEN_ALL_CORE_FT
Follow up with your PMD and Cardiologist as an outpatient.  Follow up with your Cardiologist as an outpatient.   follow up with primary care doctor and nephrologist regarding renal mass found on left kidney

## 2023-09-09 NOTE — DISCHARGE NOTE PROVIDER - ATTENDING DISCHARGE PHYSICAL EXAMINATION:
CONSTITUTIONAL: NAD, well-developed, well-groomed  EYES: PERRLA; conjunctiva and sclera clear  ENMT: Moist oral mucosa,   NECK: Supple,   RESPIRATORY: Normal respiratory effort; lungs are clear to auscultation bilaterally  CARDIOVASCULAR: Regular rate and rhythm, normal S1 and S2, no murmur/rub/gallop;   pitting lower extremity edema; Peripheral pulses are 2+ bilaterally  ABDOMEN: Nontender to palpation, normoactive bowel sounds,   MUSCULOSKELETAL:  no clubbing or cyanosis of digits; no joint swelling or tenderness to palpation, moving all extremities   PSYCH: A+O to person, place, and time; affect appropriate  SKIN: No rashes; no palpable lesions on exposed surfaces

## 2023-09-09 NOTE — PROGRESS NOTE ADULT - PROBLEM SELECTOR PROBLEM 5
Hypertension
Hypertension
Chronic atrial fibrillation
Anemia
Hypothyroid
Chronic atrial fibrillation
Chronic atrial fibrillation
Hypertension
Hypothyroid
Chronic atrial fibrillation
Hypothyroid

## 2023-09-09 NOTE — PROVIDER CONTACT NOTE (OTHER) - ASSESSMENT
BP 78/54 manual. PT remains asymptomatic. BP meds to be held for now.
Asymptomatic. Denies dizziness/lightheadedness.
Pt resting comfortably, asymptomatic, no c/o of dizziness, SOB, or chest pain at this time.
pt asymptomatic, A&Ox4, denies chest pain or SOB.
pt asymptomatic

## 2023-09-09 NOTE — DISCHARGE NOTE PROVIDER - NSDCMRMEDTOKEN_GEN_ALL_CORE_FT
Called and spoke w/Pt regarding CT L-spine results. Discussed referring him to neurosurgery in [place]. Pt verbalized understanding.   metOLazone 2.5 mg oral tablet: 1 tab(s) orally 3 times a week as needed for  significant weight gain  metoprolol succinate 50 mg oral tablet, extended release: 1 tab(s) orally once a day  spironolactone 25 mg oral tablet: 1 tab(s) orally once a day  Tirosint 175 mcg (0.175 mg) oral capsule: 1 cap(s) orally once a day - must be this particular brand  torsemide 20 mg oral tablet: 1 tab(s) orally once a day  warfarin 7.5 mg oral tablet: 1 tab(s) orally once a day   metoprolol succinate 50 mg oral tablet, extended release: 1 tab(s) orally once a day  spironolactone 25 mg oral tablet: 1 tab(s) orally once a day  Tirosint 175 mcg (0.175 mg) oral capsule: 1 cap(s) orally once a day - must be this particular brand  torsemide 20 mg oral tablet: 1 tab(s) orally once a day  warfarin 7.5 mg oral tablet: 1 tab(s) orally once a day

## 2023-09-09 NOTE — PROGRESS NOTE ADULT - PROBLEM SELECTOR PLAN 5
- stable
- c/w synthroid
- c/w home med
- c/w home warfarin 7.5 mg  - f/u daily INR
- c/w home warfarin 7.5 mg  - f/u daily INR
- c/w synthroid
- c/w home warfarin 7.5 mg  - f/u daily INR
- bp stable  - c/w spironlactone and metoprolol  - start entresto
- c/w home warfarin 7.5 mg  - f/u daily INR
-currently on the lower side of normal range  -monitor closely while titrating diuretics  - trend lactate daily
- bp stable  - c/w losartan and metoprolol

## 2023-09-09 NOTE — PROGRESS NOTE ADULT - PROBLEM SELECTOR PLAN 4
- bp stable  - losartan held
- bp stable  - losartan held to allow BP room for diuresis resume when able
- stable
- bp stable  - c/w losartan and metoprolol
- stable
- c/w home med
- c/w home med
- bp stable  - c/w losartan and metoprolol
-c/w home med  - f/u TSH
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
- stable

## 2023-09-09 NOTE — PROGRESS NOTE ADULT - PROBLEM SELECTOR PLAN 3
- AC: warfarin  - INR 9/9: 1.87   >     > Discussed with clinical pharmacist, no dose adjustment is required at the moment. Continue home dose warfarin 7.5mg po qd. Patient to follow up outpatient for routine weekly INR checks.
- stable
- c/w home med
- c/w home med
- noted on oupt w/u  - ct imaging pending
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
- stable
- c/w home med
- c/w home med
-at last admission, after hemorrhage, pt was dc'd at hgb 9.1  -this admission, 9.7.   -macrocytic.   >f/u folate/b12
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

## 2023-09-09 NOTE — PROGRESS NOTE ADULT - PROBLEM SELECTOR PLAN 6
- c/w home warfarin 7.5 mg  - f/u daily INR
- c/w synthroid      - 9/9: patient's brother updated at bedside.     #Discharge planning 50mins.   Patient is medically stable for discharge today, however she is refusing discharge.   F/U CM regarding HHA.
c/w home warfarin  f/u daily INR
- c/w home warfarin 7.5 mg  - f/u daily INR-- 2.2
- c/w home warfarin 7.5 mg  - f/u daily INR-- 1.96
- c/w home warfarin 7.5 mg  - f/u daily INR
- c/w home warfarin 7.5 mg  - f/u daily INR-- 2.18

## 2023-09-09 NOTE — PROGRESS NOTE ADULT - PROBLEM SELECTOR PROBLEM 1
Acute on chronic right heart failure

## 2023-09-09 NOTE — DISCHARGE NOTE PROVIDER - HOSPITAL COURSE
53F with biventricular HF, afib on coumadin (s/p MV and TV repair), recent hemorrhage after tooth extraction on coumadin, admitted for right heart failure exacerbation.    ·  Problem: Acute on chronic right heart failure.   ·  Plan: - TTE 8/31- EF 20%, severe MR and LV global hypokinesis   - HOLD torsemide 20 mg bid  and entresto 9/8 due to hypotension, given NS total of 500 cc  - repeat tte 9/7 unchanged  - c/w metoprolol 50 mg daily, spironolactone 25 mg BID with hold parameters  - holding metolazone  - cardiology following.    ·  Problem: Hypertension.   ·  Plan: - bp stable   - HOLD torsemide and entresto   - c/w spironolactone and metoprolol.    ·  Problem: Renal mass of unknown nature.   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    ·  Problem: Anemia.   ·  Plan: - stable.    ·  Problem: Hypothyroid.   ·  Plan: - c/w synthroid.    ·  Problem: Chronic atrial fibrillation.   ·  Plan: - c/w home warfarin 7.5 mg  - f/u daily INR-- 2.2. 53F with biventricular HF, afib on coumadin (s/p MV and TV repair), recent hemorrhage after tooth extraction on coumadin, admitted for right heart failure exacerbation.    ·  Problem: Acute on chronic right heart failure.   ·  Plan: - TTE 8/31- EF 20%, severe MR and LV global hypokinesis   - HOLD entresto 9/8 due to hypotension, given NS total of 500 cc; holding entresto on dc, can c/w torsemide  - repeat tte 9/7 unchanged  - c/w metoprolol 50 mg daily, spironolactone 25 mg BID with hold parameters  - holding metolazone  - cardiology following.    ·  Problem: Hypertension.   ·  Plan: - bp stable   - HOLD  entresto   - c/w spironolactone and metoprolol.    ·  Problem: Renal mass of unknown nature.   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    ·  Problem: Anemia.   ·  Plan: - stable.    ·  Problem: Hypothyroid.   ·  Plan: - c/w synthroid.    ·  Problem: Chronic atrial fibrillation.   ·  Plan: - c/w home warfarin 7.5 mg      pt clear for dc back to group home as discussed with Dr. Rodriguez 53F with biventricular HF, afib on coumadin (s/p MV and TV repair), recent hemorrhage after tooth extraction on coumadin, admitted for right heart failure exacerbation.    ·  Problem: Acute on chronic right heart failure.   ·  Plan: - TTE 8/31- EF 20%, severe MR and LV global hypokinesis   - HOLD torsemide 20 mg bid  and entresto 9/8 due to hypotension, given NS total of 500 cc  - repeat tte 9/7 unchanged  - c/w metoprolol 50 mg daily, spironolactone 25 mg BID with hold parameters  - holding metolazone  >    >  9/9: discussed case with Dr. Gale, patient is cleared for discharge from cardiology standpoint. BP is acceptable as patient is not symptomatic.   - Encourage po fluid intake as tolerated.    ·  Problem: Hypertension.   ·  Plan: #Hypotension  - On torsemide q12h  - Stopped Entresto, discussed with Dr. Gale 9/9  - Continue spironolactone and metoprolol.    ·  Problem: Chronic atrial fibrillation.   ·  Plan: - AC: warfarin  - INR 9/9: 1.87 >1.97   >     > Discussed with clinical pharmacist, no dose adjustment is required at the moment. Continue home dose warfarin 7.5mg po qd. Patient to follow up outpatient for routine weekly INR checks.    ·  Problem: Renal mass of unknown nature.   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    ·  Problem: Anemia.   ·  Plan: - stable.    ·  Problem: Hypothyroid.   ·  Plan: - c/w synthroid      Patient is medically stable for discharge and in agreement today.   F/U CM regarding HHA.        pt clear for dc back to group home as discussed with Dr. Rodriguez

## 2023-09-09 NOTE — DISCHARGE NOTE PROVIDER - CARE PROVIDER_API CALL
Alden Gale  Cardiovascular Disease  [address] [facility] [address], [address]  [place] [address]  Phone: [phone]  Fax: [phone]  Follow Up Time: 1 week    Cheryle Spence  [address], [address]  [place] [address]  Phone: [phone]  Fax: [phone]  Follow Up Time: 1 week

## 2023-09-09 NOTE — DISCHARGE NOTE PROVIDER - PROVIDER TOKENS
PROVIDER:[TOKEN:[[id number]],FOLLOWUP:[1 week]],PROVIDER:[TOKEN:[[id number]],FOLLOWUP:[1 week]]

## 2023-09-09 NOTE — PROGRESS NOTE ADULT - SUBJECTIVE AND OBJECTIVE BOX
[facility] Division of Hospital Medicine  Clover Rodriguez MD M-F, 8A-5P: MS Teams, Pager  Other Times: [facility] / [facility] Pager      Patient is a 53y old  Female who presents with a chief complaint of fluid overload (09 Sep 2023 12:30)      SUBJECTIVE / OVERNIGHT EVENTS:  Patient was examined this morning. She is sitting in bed, she states she is asymptomatic, she is ambulating without dizziness. However she does not feel comfortable getting discharged today. Per brother at bedside, he is waiting to speak with her parents and SW again regarding discharge planning.       ADDITIONAL REVIEW OF SYSTEMS:    MEDICATIONS  (STANDING):  metoprolol succinate ER 50 milliGRAM(s) Oral daily  spironolactone 25 milliGRAM(s) Oral two times a day  Tirosint (levothyroxine) 175 mcg 1 Capsule(s) 1 Capsule(s) Oral <User Schedule>  torsemide 20 milliGRAM(s) Oral every 12 hours  warfarin 7.5 milliGRAM(s) Oral once    MEDICATIONS  (PRN):  acetaminophen     Tablet .. 650 milliGRAM(s) Oral every 6 hours PRN Temp greater or equal to 38C (100.4F), Mild Pain (1 - 3)  aluminum hydroxide/magnesium hydroxide/simethicone Suspension 30 milliLiter(s) Oral every 4 hours PRN Dyspepsia  melatonin 3 milliGRAM(s) Oral at bedtime PRN Insomnia  ondansetron Injectable 4 milliGRAM(s) IV Push every 8 hours PRN Nausea and/or Vomiting      CAPILLARY BLOOD GLUCOSE          I&O's Summary    08 Sep 2023 07:  -  09 Sep 2023 07:00  --------------------------------------------------------  IN: 480 mL / OUT: 0 mL / NET: 480 mL    09 Sep 2023 07:  -  09 Sep 2023 15:00  --------------------------------------------------------  IN: 240 mL / OUT: 0 mL / NET: 240 mL        Daily     Daily Weight in k.2 (09 Sep 2023 04:06)    PHYSICAL EXAM:  Vital Signs Last 24 Hrs  T(C): 36.3 (09 Sep 2023 10:55), Max: 36.7 (08 Sep 2023 21:24)  T(F): 97.4 (09 Sep 2023 10:55), Max: 98 (08 Sep 2023 21:24)  HR: 85 (09 Sep 2023 10:55) (82 - 86)  BP: 78/54 (09 Sep 2023 11:15) (78/54 - 104/61)  BP(mean): --  RR: 18 (09 Sep 2023 10:56) (18 - 18)  SpO2: 100% (09 Sep 2023 10:56) (99% - 100%)    Parameters below as of 09 Sep 2023 10:56  Patient On (Oxygen Delivery Method): room air      CONSTITUTIONAL: NAD, well-developed, well-groomed  EYES: PERRLA; conjunctiva and sclera clear  ENMT: Moist oral mucosa,   NECK: Supple,   RESPIRATORY: Normal respiratory effort; lungs are clear to auscultation bilaterally  CARDIOVASCULAR: Regular rate and rhythm, normal S1 and S2, no murmur/rub/gallop;   pitting lower extremity edema; Peripheral pulses are 2+ bilaterally  ABDOMEN: Nontender to palpation, normoactive bowel sounds,   MUSCULOSKELETAL:  no clubbing or cyanosis of digits; no joint swelling or tenderness to palpation, moving all extremities   PSYCH: A+O to person, place, and time; affect appropriate  SKIN: No rashes; no palpable lesions on exposed surfaces       LABS:                        10.8   4.02  )-----------( 155      ( 09 Sep 2023 09:59 )             33.5         132<L>  |  93<L>  |  19  ----------------------------<  103<H>  3.7   |  27  |  0.62    Ca    8.9      09 Sep 2023 09:59  Mg     1.8     -        PT/INR - ( 09 Sep 2023 09:59 )   PT: 20.2 sec;   INR: 1.87 ratio         PTT - ( 09 Sep 2023 09:59 )  PTT:43.0 sec      Urinalysis Basic - ( 09 Sep 2023 09:59 )    Color: x / Appearance: x / SG: x / pH: x  Gluc: 103 mg/dL / Ketone: x  / Bili: x / Urobili: x   Blood: x / Protein: x / Nitrite: x   Leuk Esterase: x / RBC: x / WBC x   Sq Epi: x / Non Sq Epi: x / Bacteria: x            RADIOLOGY & ADDITIONAL TESTS:  Results Reviewed:   Imaging Personally Reviewed:  Electrocardiogram Personally Reviewed:    COORDINATION OF CARE:  Care Discussed with Consultants/Other Providers [Y/N]:  Prior or Outpatient Records Reviewed [Y/N]:

## 2023-09-09 NOTE — PROGRESS NOTE ADULT - PROBLEM SELECTOR PROBLEM 6
Chronic atrial fibrillation
Hypothyroid
Chronic atrial fibrillation

## 2023-09-09 NOTE — PROGRESS NOTE ADULT - PROBLEM SELECTOR PROBLEM 2
Anemia
Hyponatremia
Anemia
Anemia
Hypertension
Hyponatremia
Anemia
Renal mass of unknown nature
Hypertension

## 2023-09-09 NOTE — PROGRESS NOTE ADULT - PROBLEM SELECTOR PROBLEM 3
Renal mass of unknown nature
Hypothyroid
Anemia
Hypothyroid
Hypothyroid
Chronic atrial fibrillation
Renal mass of unknown nature
Anemia
Hypothyroid
Renal mass of unknown nature
Anemia

## 2023-09-09 NOTE — PROGRESS NOTE ADULT - PROBLEM SELECTOR PLAN 1
- TTE 8/31- EF 20%, severe MR and LV global hypokinesis   - resume torsemide 20 mg bid  and entresto 9/7, with hold parameters   - repeat tte 9/7 to reassess mitral valve now euvolemic  - c/w metoprolol 50 mg daily, spironolactone 25 mg BID with hold parameters  - holding metolazone  - cardiology following
- TTE 8/31- EF 20%, severe MR and LV global hypokinesis   - transition to torsemide 20 mg bid  - repeat tte 9/5 to reassess mitral valve now euvolemic  - c/w metoprolol 50 mg daily, spironolactone 25 mg BID   - add entresto  - holding metolazone  - cardiology following
TTE 8/31- EF 20%, severe MR and LV global hypokinesis   - lasix 60 IV BID   - c/w metoprolol 50 mg daily, spironolactone 25 mg daily  - strict I&Os, daily weights  - cardiology following
- TTE 8/31- EF 20%, severe MR and LV global hypokinesis   - holding torsemide 20 mg bid  and entresto due to low bp on 9/6-- will reassess and give once bp improved  - repeat tte 9/5 to reassess mitral valve now euvolemic  - c/w metoprolol 50 mg daily, spironolactone 25 mg BID with hold parameters  - holding metolazone  - cardiology following
TTE 8/31- EF 20%, severe MR and LV global hypokinesis   - lasix 60 IV BID   - c/w metoprolol 50 mg daily, spironolactone 25 mg daily  - Eventual RAASi and SGLT2i per cardiology   - strict I&Os, daily weights (will discuss with nursing staff recording urine output if possible and standing weights)   - lower extremity compression stockings, elevate as well   - diuretic induced hypokalemia, will discuss with cardiology increasing lee to BID dosing or initiating standing potassium   - cardiology following reccs appreciated
- TTE 8/31- EF 20%, severe MR and LV global hypokinesis   - HOLD torsemide 20 mg bid  and entresto 9/8 due to hypotension, given NS total of 500 cc  - repeat tte 9/7 unchanged  - c/w metoprolol 50 mg daily, spironolactone 25 mg BID with hold parameters  - holding metolazone    >    >  9/9: discussed case with Dr. Gale, patient is cleared for discharge from cardiology standpoint. BP is acceptable as patient is not symptomatic.   - Encourage po fluid intake as tolerated.
Hx of biventricular failure, presents with b/l LE edema and weight gain c/w acute on chronic R heart failure, followed by Dr. Gale s/p recent valve-in-valve tricuspid valve replacement. Previously took metolazone 2.5 mg qod x 1 week for RHF exacerbation  - most recent echo 6/22/023  - repeat echo to ensure adequate function of tricuspid valve  - home meds: aldactone 25 mg, metoprolol 100 mg daily (has rx for both 50 and 100 mg tabs recently, unclear if she is taking both or only 1), torsemide 20 mg bid  - c/w metoprolol 50 mg daily, spironolactone 25 mg daily  - strict I&Os, daily weights  - hold diuretic until sNa normalizes  - cardiology consulted for management of R heart failure exacerbation
TTE 8/31- EF 20%, severe MR and LV global hypokinesis   - lasix 60 IV BID   - c/w metoprolol 50 mg daily, spironolactone 25 mg BID   - Eventual RAASi and SGLT2i per cardiology   - strict I&Os, daily weights (will discuss with nursing staff recording urine output if possible and standing weights)   - lower extremity compression stockings, elevate as well   - diuretic resistance? will discuss with cardiology increasing lasix to 60mg IB TID or adding metolazone   - cardiology following reccs appreciated
- TTE 8/31- EF 20%, severe MR and LV global hypokinesis   - HOLD torsemide 20 mg bid  and entresto 9/8 due to hypotension, given NS total of 500 cc  - repeat tte 9/7 unchanged  - c/w metoprolol 50 mg daily, spironolactone 25 mg BID with hold parameters  - holding metolazone  - cardiology following
- c/w lasix 40 mg IV q12h  - TTE 8/31- EF 20%, sever MR and LV hypokinesis   - c/w metoprolol 50 mg daily, spironolactone 25 mg daily  - dopplers of the B/L LE and RUE negative for dvt   - strict I&Os, daily weights  - cardiology following
- start lasix 40 mg q12h  - most recent echo 6/22/023  - c/w metoprolol 50 mg daily, spironolactone 25 mg daily  - strict I&Os, daily weights  - cardiology following

## 2023-09-09 NOTE — PROGRESS NOTE ADULT - PROBLEM SELECTOR PLAN 2
#Hypotension    - On torsemide q12h  - Stopped Entresto, discussed with Dr. Gale 9/9  - Continue spironolactone and metoprolol

## 2023-09-09 NOTE — PROGRESS NOTE ADULT - NSPROGADDITIONALINFOA_GEN_ALL_CORE
disposition: undergoing iv diuresis, pr cards     Kayla Roberts D.O.  Division of Hospital Medicine  Available on MS Teams
disposition: dc planning to group home once bp stable- likely 9/9    Kayla Roberts D.O.  Division of Hospital Medicine  Available on MS Teams
disposition: repeat tte pending; cta abd pending    Kayla Roberts D.O.  Division of Hospital Medicine  Available on MS Teams
disposition: repeat tte pending; if bp stable, DC 9/8 to group home    Kayla Roberts D.O.  Division of Hospital Medicine  Available on MS Teams
disposition: undergoing iv diuresis    Kayla Roberts D.O.  Division of Hospital Medicine  Available on MS Teams
disposition: repeat tte pending; cta abd pending    Kayla Roberts D.O.  Division of Hospital Medicine  Available on MS Teams
Case and plan discussed with ACP: Josh

## 2023-09-10 VITALS
DIASTOLIC BLOOD PRESSURE: 59 MMHG | SYSTOLIC BLOOD PRESSURE: 92 MMHG | HEART RATE: 76 BPM | OXYGEN SATURATION: 100 % | TEMPERATURE: 98 F | RESPIRATION RATE: 18 BRPM

## 2023-09-10 LAB
INR BLD: 1.97 RATIO — HIGH (ref 0.85–1.18)
PROTHROM AB SERPL-ACNC: 21.2 SEC — HIGH (ref 9.5–13)

## 2023-09-10 PROCEDURE — 99232 SBSQ HOSP IP/OBS MODERATE 35: CPT

## 2023-09-10 RX ORDER — METOLAZONE 5 MG/1
1 TABLET ORAL
Refills: 0 | DISCHARGE

## 2023-09-10 RX ADMIN — Medication 50 MILLIGRAM(S): at 05:23

## 2023-09-10 RX ADMIN — Medication 20 MILLIGRAM(S): at 05:22

## 2023-09-10 RX ADMIN — SPIRONOLACTONE 25 MILLIGRAM(S): 25 TABLET, FILM COATED ORAL at 05:22

## 2023-09-12 ENCOUNTER — NON-APPOINTMENT (OUTPATIENT)
Age: 53
End: 2023-09-12

## 2023-09-15 ENCOUNTER — APPOINTMENT (OUTPATIENT)
Dept: CARDIOLOGY | Facility: CLINIC | Age: 53
End: 2023-09-15
Payer: MEDICARE

## 2023-09-15 VITALS
BODY MASS INDEX: 25.79 KG/M2 | HEART RATE: 95 BPM | SYSTOLIC BLOOD PRESSURE: 96 MMHG | DIASTOLIC BLOOD PRESSURE: 66 MMHG | OXYGEN SATURATION: 100 % | WEIGHT: 141 LBS

## 2023-09-15 PROCEDURE — 99214 OFFICE O/P EST MOD 30 MIN: CPT

## 2023-09-18 LAB
ALBUMIN SERPL ELPH-MCNC: 4.8 G/DL
ALP BLD-CCNC: 87 U/L
ALT SERPL-CCNC: 10 U/L
ANION GAP SERPL CALC-SCNC: 16 MMOL/L
AST SERPL-CCNC: 21 U/L
BILIRUB SERPL-MCNC: 1.7 MG/DL
BUN SERPL-MCNC: 21 MG/DL
CALCIUM SERPL-MCNC: 9.4 MG/DL
CHLORIDE SERPL-SCNC: 90 MMOL/L
CO2 SERPL-SCNC: 26 MMOL/L
CREAT SERPL-MCNC: 0.61 MG/DL
EGFR: 106 ML/MIN/1.73M2
GLUCOSE SERPL-MCNC: 140 MG/DL
HCT VFR BLD CALC: 32.6 %
HGB BLD-MCNC: 10.2 G/DL
INR PPP: 1.62 RATIO
MCHC RBC-ENTMCNC: 31.3 GM/DL
MCHC RBC-ENTMCNC: 32.3 PG
MCV RBC AUTO: 103.2 FL
NT-PROBNP SERPL-MCNC: 5656 PG/ML
PLATELET # BLD AUTO: 142 K/UL
POTASSIUM SERPL-SCNC: 4 MMOL/L
PROT SERPL-MCNC: 7.1 G/DL
PT BLD: 18 SEC
RBC # BLD: 3.16 M/UL
RBC # FLD: 15.9 %
SODIUM SERPL-SCNC: 132 MMOL/L
TSH SERPL-ACNC: 0.6 UIU/ML
WBC # FLD AUTO: 5.51 K/UL

## 2023-09-19 LAB — INR PPP: 1.6 RATIO

## 2023-09-22 ENCOUNTER — APPOINTMENT (OUTPATIENT)
Dept: CARDIOLOGY | Facility: CLINIC | Age: 53
End: 2023-09-22

## 2023-09-29 LAB
ANION GAP SERPL CALC-SCNC: 15 MMOL/L
BUN SERPL-MCNC: 14 MG/DL
CALCIUM SERPL-MCNC: 9.5 MG/DL
CHLORIDE SERPL-SCNC: 93 MMOL/L
CO2 SERPL-SCNC: 28 MMOL/L
CREAT SERPL-MCNC: 0.66 MG/DL
EGFR: 104 ML/MIN/1.73M2
GLUCOSE SERPL-MCNC: 84 MG/DL
INR PPP: 3.43 RATIO
POTASSIUM SERPL-SCNC: 4.1 MMOL/L
PT BLD: 37.4 SEC
SODIUM SERPL-SCNC: 135 MMOL/L

## 2023-10-16 LAB
INR PPP: 2.51 RATIO
PT BLD: 27.6 SEC

## 2023-10-17 ENCOUNTER — APPOINTMENT (OUTPATIENT)
Dept: FAMILY MEDICINE | Facility: CLINIC | Age: 53
End: 2023-10-17
Payer: MEDICARE

## 2023-10-17 ENCOUNTER — RX RENEWAL (OUTPATIENT)
Age: 53
End: 2023-10-17

## 2023-10-17 DIAGNOSIS — Z23 ENCOUNTER FOR IMMUNIZATION: ICD-10-CM

## 2023-10-17 PROCEDURE — G0008: CPT

## 2023-10-17 PROCEDURE — 90686 IIV4 VACC NO PRSV 0.5 ML IM: CPT

## 2023-10-25 ENCOUNTER — RX RENEWAL (OUTPATIENT)
Age: 53
End: 2023-10-25

## 2023-11-01 LAB
INR PPP: 2.48 RATIO
PT BLD: 27.3 SEC

## 2023-11-21 LAB
INR PPP: 2.22 RATIO
PT BLD: 24.7 SEC

## 2023-11-30 NOTE — PACU DISCHARGE NOTE - NAUSEA/VOMITING:
None Patient arrives to the ER c/o left jaw, ear, head and eye pain and swelling, patient said the side of her jaw filled up and then it drained and then pressure and pain went away yesterday but when patient woke up this morning the same thing happened. Patient states this happened in 2020 before patient got her sinus surgery

## 2023-12-13 PROCEDURE — 93971 EXTREMITY STUDY: CPT

## 2023-12-13 PROCEDURE — 82435 ASSAY OF BLOOD CHLORIDE: CPT

## 2023-12-13 PROCEDURE — 82747 ASSAY OF FOLIC ACID RBC: CPT

## 2023-12-13 PROCEDURE — C8924: CPT

## 2023-12-13 PROCEDURE — 85025 COMPLETE CBC W/AUTO DIFF WBC: CPT

## 2023-12-13 PROCEDURE — 80048 BASIC METABOLIC PNL TOTAL CA: CPT

## 2023-12-13 PROCEDURE — 82947 ASSAY GLUCOSE BLOOD QUANT: CPT

## 2023-12-13 PROCEDURE — 99285 EMERGENCY DEPT VISIT HI MDM: CPT

## 2023-12-13 PROCEDURE — 36415 COLL VENOUS BLD VENIPUNCTURE: CPT

## 2023-12-13 PROCEDURE — 82607 VITAMIN B-12: CPT

## 2023-12-13 PROCEDURE — 93308 TTE F-UP OR LMTD: CPT

## 2023-12-13 PROCEDURE — 82746 ASSAY OF FOLIC ACID SERUM: CPT

## 2023-12-13 PROCEDURE — 84484 ASSAY OF TROPONIN QUANT: CPT

## 2023-12-13 PROCEDURE — 83540 ASSAY OF IRON: CPT

## 2023-12-13 PROCEDURE — 83735 ASSAY OF MAGNESIUM: CPT

## 2023-12-13 PROCEDURE — 84295 ASSAY OF SERUM SODIUM: CPT

## 2023-12-13 PROCEDURE — 85730 THROMBOPLASTIN TIME PARTIAL: CPT

## 2023-12-13 PROCEDURE — 82803 BLOOD GASES ANY COMBINATION: CPT

## 2023-12-13 PROCEDURE — 83605 ASSAY OF LACTIC ACID: CPT

## 2023-12-13 PROCEDURE — 83550 IRON BINDING TEST: CPT

## 2023-12-13 PROCEDURE — 93970 EXTREMITY STUDY: CPT

## 2023-12-13 PROCEDURE — 85018 HEMOGLOBIN: CPT

## 2023-12-13 PROCEDURE — 84443 ASSAY THYROID STIM HORMONE: CPT

## 2023-12-13 PROCEDURE — 74174 CTA ABD&PLVS W/CONTRAST: CPT

## 2023-12-13 PROCEDURE — 85014 HEMATOCRIT: CPT

## 2023-12-13 PROCEDURE — 71045 X-RAY EXAM CHEST 1 VIEW: CPT

## 2023-12-13 PROCEDURE — 85610 PROTHROMBIN TIME: CPT

## 2023-12-13 PROCEDURE — 80053 COMPREHEN METABOLIC PANEL: CPT

## 2023-12-13 PROCEDURE — 83880 ASSAY OF NATRIURETIC PEPTIDE: CPT

## 2023-12-13 PROCEDURE — 84100 ASSAY OF PHOSPHORUS: CPT

## 2023-12-13 PROCEDURE — 85027 COMPLETE CBC AUTOMATED: CPT

## 2023-12-13 PROCEDURE — 93306 TTE W/DOPPLER COMPLETE: CPT

## 2023-12-13 PROCEDURE — 93321 DOPPLER ECHO F-UP/LMTD STD: CPT

## 2023-12-13 PROCEDURE — 84132 ASSAY OF SERUM POTASSIUM: CPT

## 2023-12-13 PROCEDURE — 82330 ASSAY OF CALCIUM: CPT

## 2023-12-13 PROCEDURE — 82728 ASSAY OF FERRITIN: CPT

## 2023-12-19 LAB
INR PPP: 2.84 RATIO
PT BLD: 31.4 SEC

## 2024-01-20 ENCOUNTER — LABORATORY RESULT (OUTPATIENT)
Age: 54
End: 2024-01-20

## 2024-01-22 LAB
ALBUMIN SERPL ELPH-MCNC: 5 G/DL
ALP BLD-CCNC: 94 U/L
ALT SERPL-CCNC: 11 U/L
ANION GAP SERPL CALC-SCNC: 16 MMOL/L
AST SERPL-CCNC: 22 U/L
BILIRUB SERPL-MCNC: 2.8 MG/DL
BUN SERPL-MCNC: 15 MG/DL
CALCIUM SERPL-MCNC: 9.9 MG/DL
CHLORIDE SERPL-SCNC: 92 MMOL/L
CHOLEST SERPL-MCNC: 121 MG/DL
CO2 SERPL-SCNC: 27 MMOL/L
CREAT SERPL-MCNC: 0.8 MG/DL
EGFR: 88 ML/MIN/1.73M2
ESTIMATED AVERAGE GLUCOSE: 128 MG/DL
GLUCOSE SERPL-MCNC: 105 MG/DL
HBA1C MFR BLD HPLC: 6.1 %
HCT VFR BLD CALC: 36.2 %
HDLC SERPL-MCNC: 46 MG/DL
HGB BLD-MCNC: 11.7 G/DL
INR PPP: 3.71 RATIO
LDLC SERPL CALC-MCNC: 60 MG/DL
MCHC RBC-ENTMCNC: 29.5 PG
MCHC RBC-ENTMCNC: 32.3 GM/DL
MCV RBC AUTO: 91.2 FL
NONHDLC SERPL-MCNC: 75 MG/DL
NT-PROBNP SERPL-MCNC: 4189 PG/ML
PLATELET # BLD AUTO: 132 K/UL
POTASSIUM SERPL-SCNC: 4.6 MMOL/L
PROT SERPL-MCNC: 7.3 G/DL
PT BLD: 40.3 SEC
RBC # BLD: 3.97 M/UL
RBC # FLD: 20.4 %
SODIUM SERPL-SCNC: 135 MMOL/L
TRIGL SERPL-MCNC: 72 MG/DL
TSH SERPL-ACNC: 4.92 UIU/ML
WBC # FLD AUTO: 5.61 K/UL

## 2024-01-24 ENCOUNTER — APPOINTMENT (OUTPATIENT)
Dept: CARDIOLOGY | Facility: CLINIC | Age: 54
End: 2024-01-24
Payer: MEDICARE

## 2024-01-24 ENCOUNTER — NON-APPOINTMENT (OUTPATIENT)
Age: 54
End: 2024-01-24

## 2024-01-24 VITALS
OXYGEN SATURATION: 98 % | WEIGHT: 132 LBS | BODY MASS INDEX: 24.29 KG/M2 | HEART RATE: 77 BPM | DIASTOLIC BLOOD PRESSURE: 70 MMHG | HEIGHT: 62 IN | SYSTOLIC BLOOD PRESSURE: 110 MMHG

## 2024-01-24 DIAGNOSIS — I50.810 RIGHT HEART FAILURE, UNSPECIFIED: ICD-10-CM

## 2024-01-24 DIAGNOSIS — Z79.01 LONG TERM (CURRENT) USE OF ANTICOAGULANTS: ICD-10-CM

## 2024-01-24 DIAGNOSIS — E05.00 THYROTOXICOSIS WITH DIFFUSE GOITER W/OUT THYROTOXIC CRISIS OR STORM: ICD-10-CM

## 2024-01-24 PROCEDURE — 99214 OFFICE O/P EST MOD 30 MIN: CPT

## 2024-01-24 PROCEDURE — 93000 ELECTROCARDIOGRAM COMPLETE: CPT

## 2024-01-24 RX ORDER — METOPROLOL SUCCINATE 50 MG/1
50 TABLET, EXTENDED RELEASE ORAL
Qty: 90 | Refills: 3 | Status: ACTIVE | COMMUNITY
Start: 2023-05-23 | End: 1900-01-01

## 2024-01-24 RX ORDER — METOLAZONE 2.5 MG/1
2.5 TABLET ORAL DAILY
Qty: 30 | Refills: 9 | Status: DISCONTINUED | COMMUNITY
Start: 2023-06-30 | End: 2024-01-24

## 2024-01-24 RX ORDER — LEVOTHYROXINE SODIUM 175 UG/1
175 CAPSULE ORAL
Refills: 0 | Status: ACTIVE | COMMUNITY
Start: 2023-04-21

## 2024-01-24 RX ORDER — DIGOXIN 125 UG/1
125 TABLET ORAL
Qty: 90 | Refills: 2 | Status: ACTIVE | COMMUNITY
Start: 2023-09-22 | End: 1900-01-01

## 2024-01-24 RX ORDER — ASPIRIN ENTERIC COATED TABLETS 81 MG 81 MG/1
81 TABLET, DELAYED RELEASE ORAL DAILY
Qty: 90 | Refills: 3 | Status: COMPLETED | COMMUNITY
Start: 2023-05-23 | End: 2024-01-24

## 2024-01-24 RX ORDER — WARFARIN 5 MG/1
5 TABLET ORAL DAILY
Qty: 90 | Refills: 1 | Status: ACTIVE | COMMUNITY
Start: 2023-09-29 | End: 1900-01-01

## 2024-01-24 NOTE — REASON FOR VISIT
[FreeTextEntry1] : Dominique Erik Cueto  53 years old well-known to me with congenital heart disease here for follow-up of her cardiac status.  She has been relatively stable since the last visit with further weight loss 232 pounds.  She is complaining of increased fatigue and decreased exercise tolerance

## 2024-01-24 NOTE — DISCUSSION/SUMMARY
[FreeTextEntry1] : 1.  For now continue present regimen of medication 2.   changed to Coumadin as her INR was over 3 to 5 mg 5 days a week 7.52 days a week  3.  Refer again to heart failure Dr. Downing Rodriguez   [EKG obtained to assist in diagnosis and management of assessed problem(s)] : EKG obtained to assist in diagnosis and management of assessed problem(s)

## 2024-01-24 NOTE — PHYSICAL EXAM
[Normal Conjunctiva] : normal conjunctiva [Normal S1, S2] : normal S1, S2 [Clear Lung Fields] : clear lung fields [Soft] : abdomen soft [de-identified] : Looks well no acute distress vivacious in good spirits no respiratory distress  [Non Tender] : non-tender [de-identified] : Marked neck vein distention at 45 degrees [de-identified] : S1-S2 irregularly irregular 2/6 murmur at the apex 1/4 diastolic blow at the right lower base right sternal border?  Pulmonic insufficiency [de-identified] : Ecchymoses behind her left forearm and ecchymoses behind her legs near the popliteal area these seem spontaneous [de-identified] : At least 2+ pitting edema bilaterally prominent varicose veins

## 2024-01-24 NOTE — ASSESSMENT
[FreeTextEntry1] : Dominique Mejia has congenital heart disease cardiomyopathy status post tricuspid valve replacement mitral annuloplasty now with severe tricuspid insufficiency being evaluated for valve in valve. She recently has developed weight gain facial puffiness and some edema of her lower extremities with some shortness of breath. She also has a renal lesion which needs further evaluation with an MRI. she is status post percutaneous valve in valve replacement with tricuspid valve with no significant residual tricuspid insufficiency on repeat echo postprocedure. She has lost weight and feels well with stable vital signs. She clearly has improved since the tricuspid valve and valve replacement. She recently has gained weight though her echo reveals a well-functioning tricuspid valve in valve with moderate LV dysfunction and moderate mitral regurgitation. On torsemide 20 and off Zaroxolyn she has gained weight which now has decreased somewhat but she remains edematous despite torsemide 20 twice daily and has a soft blood pressure.  her last echocardiogram was in 9/23 and revealed a well-functioning valve in valve in the tricuspid position moderate mitral regurgitation and severe global LV dysfunction with an EF calculated somewhere between 20 and 25%   1. Severe tricuspid insufficiency with weight gain edema facial puffiness- status post valve in valve tricuspid valve replacement with weight loss and improvement in her status.  Her weight now is 132 pounds.  She does complain of significant fatigue more than shortness of breath and has minimal edema of the lower extremities  2. 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 Atrial fibrillation ventricular rate controlled on Coumadin  4.  edema of the lower extremities has pretty much resolved with mild edema  5. Hypothyroidism on Synthroid replacement- now on  Synthroid 175 mcg last TSH was 4 range    6. Abnormal CT with questionable lesion in the kidney - CT scan ruled out any significant pathology.  This is no longer an issue  7.  patient no longer has weight gain and her weight now is 132 pounds   her main issue at the present time is severe fatigue able to do less activity but she is less fluid overloaded.  She has severe LV dysfunction which I think plays a major role.  She is tolerating Altace at 5 mg but did not tolerate Entresto

## 2024-01-24 NOTE — HISTORY OF PRESENT ILLNESS
[FreeTextEntry1] : The patient is status post recent tricuspid valve replacement valve in valve.  Subsequent echoes have been unremarkable with a well-functioning tricuspid valve moderate to severe LV dysfunction and moderate mitral regurgitation and significant RV dysfunction but no tricuspid insufficiency  She recently developed increasing edema facial edema and weight gain 255 pounds.  She was seen by Dr. Stroud who initially placed her on torsemide 20 which was increased to 40.  She subsequently saw structural heart and had a repeat echo which was unremarkable with a well-functioning tricuspid valve.  She continued not to lose weight and Zaroxolyn (mentalis own) 2.5 was added and she has had a 10 pound weight loss 241 pounds with almost no edema and her facial appearance has improved.  Throughout this process she has been asymptomatic.  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  We increased her torsemide to 20 mg twice a day.  She is no longer on ramipril.  2D echo revealed no tricuspid insufficiency mild to moderate mitral regurgitation and LV dysfunction and severe RV dysfunction with elevated right atrial pressure.  She is manifesting signs of right heart failure.  Her blood pressure remains on the softer side between 95 and 100  She recently developed ecchymoses under her arm and behind her legs spontaneous she does not remember hitting his  Despite the weight gain, the patient feels quite well and denies shortness of breath palpitations.  She is working full-time and exercising regularly including swimming  On June 22 weight was 158 pounds on July 3 weight was 141 pounds and presently is 145 pounds after several days of torsemide 20 twice a day  Visit September 15, 2023: At the end of August, the patient developed severe edema of the lower extremities with some weeping and a weight gain to over 150 pounds.  She was admitted to [facility]..  Repeat 2D echo revealed severe global LV dysfunction well-functioning tricuspid valve and valve moderate to moderately severe mitral regurgitation.  She had elevation of her right atrial pressure   she was aggressively diuresed with IV Lasix and subsequently received Zaroxolyn and her weight decreased 230 pounds but she developed some hypokalemia and hyponatremia and her blood pressure became softer and lower.  We made an attempt to put her on Entresto but her blood pressure could not handle it.  Throughout all of this she was relatively stable and denied shortness of breath  Presently she is feeling well but her weight has increased 242 pounds and she does have significant edema of her lower extremities with increasing varicosities.  She denies shortness of breath or palpitations.  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  She has decreased exercise tolerance.  However she has lost more weight on the same medication is now 132 pounds.  Her last INR was over 3.    Blood work January 23, 2024  WBC 5.61 hemoglobin 11.7 hematocrit 36.2 platelets 132,000  hemoglobin A1c  BNP 4189 (was 5656) LDL 60  sodium 135 potassium 4.6 chloride 92 glucose 105 BUN 15 creatinine 0.80 total bilirubin 2.8 stable GFR 88 other liver function tests are normal TSH 4.92  INR 3.71

## 2024-02-08 ENCOUNTER — LABORATORY RESULT (OUTPATIENT)
Age: 54
End: 2024-02-08

## 2024-03-08 ENCOUNTER — LABORATORY RESULT (OUTPATIENT)
Age: 54
End: 2024-03-08

## 2024-03-11 ENCOUNTER — APPOINTMENT (OUTPATIENT)
Dept: RADIOLOGY | Facility: CLINIC | Age: 54
End: 2024-03-11
Payer: MEDICARE

## 2024-03-11 LAB
ALBUMIN SERPL ELPH-MCNC: 5.1 G/DL
ALP BLD-CCNC: 96 U/L
ALT SERPL-CCNC: 14 U/L
ANION GAP SERPL CALC-SCNC: 16 MMOL/L
AST SERPL-CCNC: 27 U/L
BILIRUB SERPL-MCNC: 3.6 MG/DL
BUN SERPL-MCNC: 19 MG/DL
CALCIUM SERPL-MCNC: 10.2 MG/DL
CHLORIDE SERPL-SCNC: 88 MMOL/L
CO2 SERPL-SCNC: 28 MMOL/L
CREAT SERPL-MCNC: 0.8 MG/DL
EGFR: 87 ML/MIN/1.73M2
GLUCOSE SERPL-MCNC: 82 MG/DL
HCT VFR BLD CALC: 39.7 %
HGB BLD-MCNC: 13.2 G/DL
INR PPP: 2.05 RATIO
MCHC RBC-ENTMCNC: 31.2 PG
MCHC RBC-ENTMCNC: 33.2 GM/DL
MCV RBC AUTO: 93.9 FL
NT-PROBNP SERPL-MCNC: 3071 PG/ML
PLATELET # BLD AUTO: 118 K/UL
POTASSIUM SERPL-SCNC: 4.5 MMOL/L
PROT SERPL-MCNC: 7.8 G/DL
PT BLD: 22.9 SEC
RBC # BLD: 4.23 M/UL
RBC # FLD: 19.4 %
SODIUM SERPL-SCNC: 132 MMOL/L
TSH SERPL-ACNC: 3.39 UIU/ML
WBC # FLD AUTO: 4.9 K/UL

## 2024-03-11 PROCEDURE — 77080 DXA BONE DENSITY AXIAL: CPT

## 2024-03-20 ENCOUNTER — APPOINTMENT (OUTPATIENT)
Dept: CARDIOLOGY | Facility: CLINIC | Age: 54
End: 2024-03-20
Payer: MEDICARE

## 2024-03-20 ENCOUNTER — NON-APPOINTMENT (OUTPATIENT)
Age: 54
End: 2024-03-20

## 2024-03-20 VITALS
BODY MASS INDEX: 23.23 KG/M2 | SYSTOLIC BLOOD PRESSURE: 124 MMHG | WEIGHT: 127 LBS | DIASTOLIC BLOOD PRESSURE: 88 MMHG | OXYGEN SATURATION: 98 % | HEART RATE: 71 BPM

## 2024-03-20 DIAGNOSIS — I07.1 RHEUMATIC TRICUSPID INSUFFICIENCY: ICD-10-CM

## 2024-03-20 DIAGNOSIS — Z95.3 PRESENCE OF XENOGENIC HEART VALVE: ICD-10-CM

## 2024-03-20 DIAGNOSIS — R60.0 LOCALIZED EDEMA: ICD-10-CM

## 2024-03-20 DIAGNOSIS — Z95.4 PRESENCE OF OTHER HEART-VALVE REPLACEMENT: ICD-10-CM

## 2024-03-20 DIAGNOSIS — E87.6 HYPOKALEMIA: ICD-10-CM

## 2024-03-20 PROCEDURE — 99214 OFFICE O/P EST MOD 30 MIN: CPT

## 2024-03-20 PROCEDURE — 93000 ELECTROCARDIOGRAM COMPLETE: CPT

## 2024-03-20 PROCEDURE — G2211 COMPLEX E/M VISIT ADD ON: CPT

## 2024-03-21 NOTE — PHYSICAL EXAM
[Normal Conjunctiva] : normal conjunctiva [Normal S1, S2] : normal S1, S2 [Clear Lung Fields] : clear lung fields [Soft] : abdomen soft [Non Tender] : non-tender [de-identified] : Somewhat ana complexion hands have bluish coloration [de-identified] : S1-S2 irregularly irregular 2/6 murmur at the apexi [de-identified] : No rales rhonchi or wheezes [de-identified] : No peripheral edema today

## 2024-03-21 NOTE — HISTORY OF PRESENT ILLNESS
[FreeTextEntry1] : The patient is status post recent tricuspid valve replacement valve in valve.  Subsequent echoes have been unremarkable with a well-functioning tricuspid valve moderate to severe LV dysfunction and moderate mitral regurgitation and significant RV dysfunction but no tricuspid insufficiency  She recently developed increasing edema facial edema and weight gain 255 pounds.  She was seen by Dr. Stroud who initially placed her on torsemide 20 which was increased to 40.  She subsequently saw structural heart and had a repeat echo which was unremarkable with a well-functioning tricuspid valve.  She continued not to lose weight and Zaroxolyn (mentalis own) 2.5 was added and she has had a 10 pound weight loss 241 pounds with almost no edema and her facial appearance has improved.  Throughout this process she has been asymptomatic.  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  We increased her torsemide to 20 mg twice a day.  She is no longer on ramipril.  2D echo revealed no tricuspid insufficiency mild to moderate mitral regurgitation and LV dysfunction and severe RV dysfunction with elevated right atrial pressure.  She is manifesting signs of right heart failure.  Her blood pressure remains on the softer side between 95 and 100  She recently developed ecchymoses under her arm and behind her legs spontaneous she does not remember hitting his  Despite the weight gain, the patient feels quite well and denies shortness of breath palpitations.  She is working full-time and exercising regularly including swimming  On June 22 weight was 158 pounds on July 3 weight was 141 pounds and presently is 145 pounds after several days of torsemide 20 twice a day  Visit September 15, 2023: At the end of August, the patient developed severe edema of the lower extremities with some weeping and a weight gain to over 150 pounds.  She was admitted to [facility]..  Repeat 2D echo revealed severe global LV dysfunction well-functioning tricuspid valve and valve moderate to moderately severe mitral regurgitation.  She had elevation of her right atrial pressure   she was aggressively diuresed with IV Lasix and subsequently received Zaroxolyn and her weight decreased 230 pounds but she developed some hypokalemia and hyponatremia and her blood pressure became softer and lower.  We made an attempt to put her on Entresto but her blood pressure could not handle it.  Throughout all of this she was relatively stable and denied shortness of breath  Presently she is feeling well but her weight has increased 242 pounds and she does have significant edema of her lower extremities with increasing varicosities.  She denies shortness of breath or palpitations.  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  She has decreased exercise tolerance.  However she has lost more weight on the same medication is now 132 pounds.  Her last INR was over 3.    Blood work January 23, 2024  WBC 5.61 hemoglobin 11.7 hematocrit 36.2 platelets 132,000  hemoglobin A1c  BNP 4189 (was 5656) LDL 60  sodium 135 potassium 4.6 chloride 92 glucose 105 BUN 15 creatinine 0.80 total bilirubin 2.8 stable GFR 88 other liver function tests are normal TSH 4.92  INR 3.71  Visit March 20, 2024 There has been some decline in the patient's overall clinical status.  The family has noticed a decrease in cognitive function.  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  She admits to increasing fatigue though denies significant shortness of breath.  Her last echo 9/7/2023 severely depressed left ventricular systolic function moderately enlarged right ventricular cavity size moderate to severe mitral intra valvular regurgitation transcatheter heart valve inside valve in valve in the tricuspid position no significant tricuspid insufficiency Recent blood  March 2024: INR 2.05 WBC 4.90 hemoglobin 13.2 hematocrit 39.7 BNP 3071 TSH 3.39 Sodium 132 potassium 4.5 chloride 88 CO2 28 glucose 82 BUN 19 creatinine 0.80 Total bilirubin 3.6 total protein 7.8 albumin 5.1 GFR 87 normal liver function tests

## 2024-03-21 NOTE — ASSESSMENT
[FreeTextEntry1] : Dominique Mejia has congenital heart disease cardiomyopathy status post tricuspid valve replacement mitral annuloplasty now with severe tricuspid insufficiency being evaluated for valve in valve. She recently has developed weight gain facial puffiness and some edema of her lower extremities with some shortness of breath. She also has a renal lesion which needs further evaluation with an MRI. she is status post percutaneous valve in valve replacement with tricuspid valve with no significant residual tricuspid insufficiency on repeat echo postprocedure. She has lost weight and feels well with stable vital signs. She clearly has improved since the tricuspid valve and valve replacement. She recently has gained weight though her echo reveals a well-functioning tricuspid valve in valve with moderate LV dysfunction and moderate mitral regurgitation. On torsemide 20 and off Zaroxolyn she has gained weight which now has decreased somewhat but she remains edematous despite torsemide 20 twice daily and has a soft blood pressure.  her last echocardiogram was in 9/23 and revealed a well-functioning valve in valve in the tricuspid position moderatrel ysevere mitral regurgitation and severe global LV dysfunction with an EF calculated somewhere between 20 and 25%.  Since last visit there has been is some deterioration in cognitive function though she is less edematous with clear lungs.   1. Severe tricuspid insufficiency with weight gain edema facial puffiness- status post valve in valve tricuspid valve replacement with weight loss and improvement in her status.  Her weight now is 127 pounds on March 30, 2024   2. 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 Atrial fibrillation ventricular rate controlled on Coumadin  4.  edema of the lower extremities has pretty much resolved with mild edema  5. Hypothyroidism on Synthroid replacement- now on  Synthroid 175 mcg last TSH normal  6. Abnormal CT with questionable lesion in the kidney - CT scan ruled out any significant pathology.  This is no longer an issue    her main issue at the present time is severe fatigue able to do less activity but she is less fluid overloaded.  She has severe LV dysfunction which I think plays a major role.  In addition to significant mitral insufficiency which is also probably playing a role

## 2024-03-21 NOTE — DISCUSSION/SUMMARY
[FreeTextEntry1] : 1.  Repeat 2D echo concerning LV function and mitral insufficiency 2.  Referral to advanced heart failure Dr. Chang Rodriguez 3.  Continue present regimen of medications which include: Digoxin 0.125 levothyroxine 170 mcg metoprolol ER 50 mg multivitamin potassium chloride 20 mEq ramipril 5 mg spironolactone 25 mgTorsemide 20 mg warfarin 5 mg 5 days a week 7.5 mg 2 days a week  Follow-up with me again in 1 month [EKG obtained to assist in diagnosis and management of assessed problem(s)] : EKG obtained to assist in diagnosis and management of assessed problem(s)

## 2024-04-03 ENCOUNTER — RESULT REVIEW (OUTPATIENT)
Age: 54
End: 2024-04-03

## 2024-04-03 ENCOUNTER — OUTPATIENT (OUTPATIENT)
Dept: OUTPATIENT SERVICES | Facility: HOSPITAL | Age: 54
LOS: 1 days | End: 2024-04-03
Payer: MEDICARE

## 2024-04-03 DIAGNOSIS — Z98.890 OTHER SPECIFIED POSTPROCEDURAL STATES: Chronic | ICD-10-CM

## 2024-04-03 DIAGNOSIS — I35.0 NONRHEUMATIC AORTIC (VALVE) STENOSIS: ICD-10-CM

## 2024-04-03 DIAGNOSIS — Z95.2 PRESENCE OF PROSTHETIC HEART VALVE: Chronic | ICD-10-CM

## 2024-04-03 PROCEDURE — 93356 MYOCRD STRAIN IMG SPCKL TRCK: CPT

## 2024-04-03 PROCEDURE — 76377 3D RENDER W/INTRP POSTPROCES: CPT

## 2024-04-03 PROCEDURE — 93306 TTE W/DOPPLER COMPLETE: CPT

## 2024-04-03 PROCEDURE — 76376 3D RENDER W/INTRP POSTPROCES: CPT

## 2024-04-03 PROCEDURE — 93306 TTE W/DOPPLER COMPLETE: CPT | Mod: 26

## 2024-04-03 PROCEDURE — 76377 3D RENDER W/INTRP POSTPROCES: CPT | Mod: 26

## 2024-04-04 ENCOUNTER — APPOINTMENT (OUTPATIENT)
Dept: FAMILY MEDICINE | Facility: CLINIC | Age: 54
End: 2024-04-04

## 2024-04-05 ENCOUNTER — NON-APPOINTMENT (OUTPATIENT)
Age: 54
End: 2024-04-05

## 2024-04-05 ENCOUNTER — APPOINTMENT (OUTPATIENT)
Dept: HEART FAILURE | Facility: CLINIC | Age: 54
End: 2024-04-05
Payer: MEDICARE

## 2024-04-05 VITALS
BODY MASS INDEX: 23.37 KG/M2 | DIASTOLIC BLOOD PRESSURE: 79 MMHG | HEIGHT: 62 IN | OXYGEN SATURATION: 98 % | WEIGHT: 127 LBS | HEART RATE: 76 BPM | SYSTOLIC BLOOD PRESSURE: 124 MMHG

## 2024-04-05 PROCEDURE — 99205 OFFICE O/P NEW HI 60 MIN: CPT

## 2024-04-05 PROCEDURE — G2211 COMPLEX E/M VISIT ADD ON: CPT

## 2024-04-05 PROCEDURE — 93000 ELECTROCARDIOGRAM COMPLETE: CPT

## 2024-04-05 RX ORDER — RAMIPRIL 5 MG/1
5 CAPSULE ORAL
Qty: 90 | Refills: 3 | Status: DISCONTINUED | COMMUNITY
Start: 2023-05-30 | End: 2024-04-05

## 2024-04-08 RX ORDER — POTASSIUM CHLORIDE 1500 MG/1
20 TABLET, FILM COATED, EXTENDED RELEASE ORAL
Qty: 180 | Refills: 3 | Status: DISCONTINUED | COMMUNITY
Start: 2023-08-11 | End: 2024-04-08

## 2024-04-08 NOTE — ASSESSMENT
[FreeTextEntry1] : Ms. 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 Her other co-morbitidities include cirrhosis (by imaging) and a 1.7 cm L renal mass (unclear etiology). Of note she has a developmental delay but has been able to be live independently. Currently has NYHA class III symptoms and is moderately overloaded on exam. Has mod-severe MR on exam and TTE with a declining EF over last several years. Will attempt to optimize medical therapy and volume status and reassess. If has difficulty tolerating optimal therapy, may consider advanced therapy options (e.g. transplant) however will need to exclude significant cirrhosis with a liver biopsy if needed.  1. HFrEF/NICM  - instructed to increase torsemide to 40 mg in AM/20 mg in PM for 3 days then reduce back to 20 mg twice/day; goal weight 112-114 pounds; instructed to take extra 20 mg in AM as needed for weight gain - increase lee to 25 mg twice/day; d/c KCl at evening; repeat labs in 2 weeks - start losartan 12.5 mg daily; labs in 2 weeks (eventual rechallenge of Entresto however may not tolerate d/t cirrhosis) - c/w toprol 50 mg daily (will uptitrate when more compensated) - will benefit from SGLT2i but will defer for now - monitor wieght/BP regularly; inform if SBP <90 - discussed disease process - repeat TTE after 3 months of OMT to determine ICD candidacy  2. Mod-severe MR - likely significant and contributing to symptoms - meds as above and reassess once on optimal therapy   3. Cirrhosis - would benefit from hepatology evaluation; labs notable for Tbili elevation, otherwise synthetic function appears intact - refer to hepatology  RTC 6 weeks with me

## 2024-04-08 NOTE — CARDIOLOGY SUMMARY
[de-identified] : 4/5/24 - afib, RBBB, LAFB, PVC [de-identified] : 4/3/24 - EF 25-30%, LVEDD 6 cm, severe LA dilatation, mitral ring w/ moderate-severe MR (mean 4), valve-in-valve triscupid replacement with minimal TR, severe ECHO, mild AI, E/e' 42, LVOT VTI 14 cm, IVC 2.7 cm  9/7/23 - EF 20-25%, severe biatrial dilatation, TVR w/o TR, MVr with mod-severe MR  6/22/23 - TTE. - EF 29%, LVEDD 6 cm, MVr w/ mod MR, TVR (mean 3) w/o TR, RV dilatation, severe biatrial dilatation  1/30/23 - TTE - EF 30-35%, MVr (mean 5) with at least mod MR, RV pressure/volume overload, mild-mod PI, grade 5 TR  3/17/22 - TTE - EF 30-35%, LVEDD 5.5 cm, MVr (mean 5) w/ mild-mod MR, severe LA dilatation, bioTVR w/ severe TR,

## 2024-04-08 NOTE — PHYSICAL EXAM
[Well Developed] : well developed [Well Nourished] : well nourished [No Acute Distress] : no acute distress [Normal Conjunctiva] : normal conjunctiva [No Carotid Bruit] : no carotid bruit [Normal S1, S2] : normal S1, S2 [No Murmur] : no murmur [No Rub] : no rub [No Gallop] : no gallop [Clear Lung Fields] : clear lung fields [Good Air Entry] : good air entry [No Respiratory Distress] : no respiratory distress  [Non Tender] : non-tender [No Masses/organomegaly] : no masses/organomegaly [Normal Bowel Sounds] : normal bowel sounds [Normal Gait] : normal gait [No Clubbing] : no clubbing [No Cyanosis] : no cyanosis [No Varicosities] : no varicosities [No Rash] : no rash [No Skin Lesions] : no skin lesions [Moves all extremities] : moves all extremities [No Focal Deficits] : no focal deficits [Normal Speech] : normal speech [Alert and Oriented] : alert and oriented [Normal memory] : normal memory [de-identified] : developmental delay; well appearing [de-identified] : irreg irreg rhythm, grade II/VI diastolic murmur best heard over pulmonic area; grade III/VI systolic murmur in axilla [de-identified] : JVP approx 10-12 cm w/ HJR [de-identified] : slightly distended; nontender  [de-identified] : trace edema b/l

## 2024-04-08 NOTE — HISTORY OF PRESENT ILLNESS
[FreeTextEntry1] : Ms. 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 Her other co-morbitidities include cirrhosis (by imaging) and a 1.7 cm L renal mass (unclear etiology). Of note she has a developmental delay but has been able to be live independently. Referred by Dr. Gale. Accompanied by mother.   Her HPI began when as a child when had a repair of ASD/VSD/pulm stenosis/partial anomalous pulmonary venous return (age 3 at [facility]). She had been well until 2013 when reportedly was in [place] and had collapsed. Had been hospitalized for 1 month and required ventilator (full details unavailable) and was apparently treated with sepsis. Discovered to have valvular pathology afterwards and in 2015 underwent MVr and tricuspid valve replacement at [place]. Had been doing well until 2022 when noted to be more fatigued and developed severe facial edema. A TTE revealed EF that was 30%, mod MR and severe TR. Seen by Dr. Hess for degenerated bioTVR and ultimately underwent a Ellie with Bhavana 29 mm TAVR with excellent result in May 2023. Continued to have challenges with fluid retention afterwards.   Had an admission in August 2023 for bleeding from dental procedure while on coumadin. Required transfusions and stay in SICU with improvement. Subsequently was admitted 8/29-9/10 for HF at [facility] where was 156 pounds and was diuresed down to 136 pounds. An echo had shown EF 20% and severe MR. Was trialed on Entresto but noted to become hypotensive (SBP 70s) so stopped. Labs were notable for anemia (Hb 10), Tbili elevation (2-4),   Weight has been ranging 117-119 pounds. Takes torsemide 20 mg twice/day with good effect.   Currently resides alone. Part of [facility] agency.   Previously was very functional last year but has had progressive fatigue with minimal exertion. Had previously been swimming. Also noted purplish discoloration of hands/feet over past year.   Participates in Yoga which she has been doing for past 4 years; instructor noted decline in stamina over past year.   Uses 1 pillow at night; has regular bed. Doesn't endorse orthopnea/PND. Avoids lifting things. Avoids going up stairs.

## 2024-04-15 ENCOUNTER — APPOINTMENT (OUTPATIENT)
Dept: CARDIOLOGY | Facility: CLINIC | Age: 54
End: 2024-04-15

## 2024-04-23 NOTE — REASON FOR VISIT
Physical Therapy Visit    Visit Type: Daily Treatment Note  Visit: 6  Referring Provider: Carlos Melgoza MD  Medical Diagnosis (from order): M54.32 - Left sided sciatica   Patient alert and oriented X3.    SUBJECTIVE                                                                                                               No pain coming in today, legs are a little fatigued from doing his exercises before he came. Thinks uneven surfaces have gotten better, but still feels uneasy with them. Did some yard work over the weekend for about 3 hours and wasn't too sore after. Walked on his treadmill at home for 30 minutes with \"no stumbles\", uses bilateral hand rails for support.   Functional Change: Able to do more activity with less pain    Pain / Symptoms  - Pain rating (out of 10): Current: 0       OBJECTIVE                                                                                                                           Treatment     Gait belt applied and used throughout session.  Therapeutic Exercise  Seated bariatric Nu-step workload 4 seat 26 for 6 minutes. Range, strength and activity tolerance while completing subjective questioning     Standing on foam:  Lifts/chops vs blue theraband x 20 each direction  Palloff press vs blue theraband x 15 bilaterally  TRX assisted marches x 20  TRX assisted squats x 10 - 5 then break then 5 more    Standing-   Marches vs red therband around midfoot x 15      Neuromuscular Re-Education  Side step on 1/2 foam roll - face up and face down - 1 UE support  Steps from foam square to foam square - no UE support - lateral and forward - step to pattern      Skilled input: verbal instruction/cues and posture correction    Writer verbally educated and received verbal consent for hand placement, positioning of patient, and techniques to be performed today from patient for hand placement and palpation for techniques as described above and how they are pertinent to the patient's  plan of care.  Home Exercise Program  Access Code: [email or web address]  URL: [email or web address]  Date: 04/09/2024  Prepared by: Ktaelyn Vela    Exercises  - Supine Heel Slide with Strap  - 2 x daily - 10-15 reps - 5 seconds hold  - Quad Setting and Stretching  - 2 x daily - 10-15 reps - 5 seconds hold  - Seated Hamstring Stretch  - 2 x daily - 2-3 reps - 20-30 seconds hold  - Straight Leg Raise Sciatic Nerve Flossing  - 2 x daily - 7 x weekly - 3 sets - 10 reps  - Supine Bridge  - 1 x daily - 7 x weekly - 3 sets - 10 reps  - Supine Transversus Abdominis Bracing - Hands on Stomach  - 2 x daily - 7 x weekly - 1 sets - 3 reps - 10 hold      ASSESSMENT                                                                                                            Patient did well today. All exercises performed without increase in symptoms. A couple stumbles coming down from the foam after exercises, but no major loss of balance. Stand by assist used with all exercises. Standing core stability on foam incorporated in order to progress standing balance stability, and dynamic movements on foam incorporated in order to progress patients ability to tolerate walking over uneven surfaces.  Denies pain at end of session.   Pain/symptoms after session (out of 10): 0  Education:   - Results of above outlined education: Verbalizes understanding and Needs reinforcement    PLAN                                                                                                                           Suggestions for next session as indicated: Progress per plan of care. Discussion continuation 1x week vs 2x/week if pain continues to be low with activity.  Progression of dynamic and standing balance, core stability and lower extremity strength         I was in the immediate presence of the student and directed the student’s performance of the services. I am responsible for all treatment, assessment, documentation, and billing  rendered for this patient.   Katelyn Vela, PTA        Therapy procedure time and total treatment time can be found documented on the Time Entry flowsheet     [FreeTextEntry1] : Dominique Dinh 54 years old well-known to me here for reevaluation of her cardiac status.  She was seen on March 20, 2024.  She has had some decline according to the family and her overall health with some decrease in cognitive function more lethargic less energy and more fatigued with continued weight loss.

## 2024-04-25 NOTE — DISCHARGE NOTE NURSING/CASE MANAGEMENT/SOCIAL WORK - NSDCPEPTCOWAR_GEN_ALL_CORE
Abdomen soft, non-distended.  
Warfarin/Coumadin - Compliance/Warfarin/Coumadin - Dietary Advice/Warfarin/Coumadin - Follow up monitoring/Warfarin/Coumadin - Potential for adverse drug reactions and interactions

## 2024-04-29 ENCOUNTER — RX RENEWAL (OUTPATIENT)
Age: 54
End: 2024-04-29

## 2024-05-14 LAB
ALBUMIN SERPL ELPH-MCNC: 5.1 G/DL
ALP BLD-CCNC: 92 U/L
ALT SERPL-CCNC: 20 U/L
ANION GAP SERPL CALC-SCNC: 14 MMOL/L
AST SERPL-CCNC: 26 U/L
BASOPHILS # BLD AUTO: 0.05 K/UL
BASOPHILS NFR BLD AUTO: 1 %
BILIRUB SERPL-MCNC: 3.4 MG/DL
BUN SERPL-MCNC: 32 MG/DL
CALCIUM SERPL-MCNC: 10.1 MG/DL
CHLORIDE SERPL-SCNC: 87 MMOL/L
CO2 SERPL-SCNC: 28 MMOL/L
CREAT SERPL-MCNC: 0.84 MG/DL
EGFR: 82 ML/MIN/1.73M2
EOSINOPHIL # BLD AUTO: 0.07 K/UL
EOSINOPHIL NFR BLD AUTO: 1.3 %
GLUCOSE SERPL-MCNC: 103 MG/DL
HCT VFR BLD CALC: 42.1 %
HGB BLD-MCNC: 14.5 G/DL
IMM GRANULOCYTES NFR BLD AUTO: 0.4 %
LYMPHOCYTES # BLD AUTO: 0.88 K/UL
LYMPHOCYTES NFR BLD AUTO: 16.9 %
MAN DIFF?: NORMAL
MCHC RBC-ENTMCNC: 32.2 PG
MCHC RBC-ENTMCNC: 34.4 GM/DL
MCV RBC AUTO: 93.3 FL
MONOCYTES # BLD AUTO: 0.65 K/UL
MONOCYTES NFR BLD AUTO: 12.5 %
NEUTROPHILS # BLD AUTO: 3.55 K/UL
NEUTROPHILS NFR BLD AUTO: 67.9 %
NT-PROBNP SERPL-MCNC: 906 PG/ML
PLATELET # BLD AUTO: 122 K/UL
POTASSIUM SERPL-SCNC: 4.6 MMOL/L
PROT SERPL-MCNC: 7.7 G/DL
RBC # BLD: 4.51 M/UL
RBC # FLD: 15.2 %
SODIUM SERPL-SCNC: 128 MMOL/L
WBC # FLD AUTO: 5.22 K/UL

## 2024-05-15 ENCOUNTER — NON-APPOINTMENT (OUTPATIENT)
Age: 54
End: 2024-05-15

## 2024-05-15 ENCOUNTER — APPOINTMENT (OUTPATIENT)
Dept: HEART FAILURE | Facility: CLINIC | Age: 54
End: 2024-05-15
Payer: MEDICARE

## 2024-05-15 VITALS
BODY MASS INDEX: 19.67 KG/M2 | DIASTOLIC BLOOD PRESSURE: 68 MMHG | HEIGHT: 62 IN | HEART RATE: 71 BPM | WEIGHT: 106.9 LBS | SYSTOLIC BLOOD PRESSURE: 107 MMHG | OXYGEN SATURATION: 100 %

## 2024-05-15 DIAGNOSIS — I07.1 RHEUMATIC TRICUSPID INSUFFICIENCY: ICD-10-CM

## 2024-05-15 DIAGNOSIS — R79.89 OTHER SPECIFIED ABNORMAL FINDINGS OF BLOOD CHEMISTRY: ICD-10-CM

## 2024-05-15 PROCEDURE — 99214 OFFICE O/P EST MOD 30 MIN: CPT

## 2024-05-15 PROCEDURE — 93000 ELECTROCARDIOGRAM COMPLETE: CPT

## 2024-05-15 PROCEDURE — 36415 COLL VENOUS BLD VENIPUNCTURE: CPT

## 2024-05-15 PROCEDURE — G2211 COMPLEX E/M VISIT ADD ON: CPT

## 2024-05-15 RX ORDER — CALCIUM CARBONATE/VITAMIN D3 600MG-5MCG
600-5 TABLET ORAL DAILY
Refills: 0 | Status: ACTIVE | COMMUNITY
Start: 2024-05-15

## 2024-05-16 PROBLEM — R79.89 ELEVATED LFTS: Status: ACTIVE | Noted: 2018-06-04

## 2024-05-16 LAB
ALBUMIN SERPL ELPH-MCNC: 4.9 G/DL
ALP BLD-CCNC: 83 U/L
ALT SERPL-CCNC: 30 U/L
ANION GAP SERPL CALC-SCNC: 14 MMOL/L
AST SERPL-CCNC: 29 U/L
BILIRUB SERPL-MCNC: 2.7 MG/DL
BUN SERPL-MCNC: 30 MG/DL
CALCIUM SERPL-MCNC: 10.2 MG/DL
CHLORIDE SERPL-SCNC: 93 MMOL/L
CO2 SERPL-SCNC: 27 MMOL/L
CREAT SERPL-MCNC: 0.65 MG/DL
EGFR: 104 ML/MIN/1.73M2
GLUCOSE SERPL-MCNC: 70 MG/DL
NT-PROBNP SERPL-MCNC: 977 PG/ML
POTASSIUM SERPL-SCNC: 5 MMOL/L
PROT SERPL-MCNC: 7.3 G/DL
SODIUM SERPL-SCNC: 134 MMOL/L

## 2024-05-19 NOTE — PHYSICAL EXAM
[Well Developed] : well developed [Well Nourished] : well nourished [No Acute Distress] : no acute distress [Normal Conjunctiva] : normal conjunctiva [No Carotid Bruit] : no carotid bruit [Normal S1, S2] : normal S1, S2 [No Murmur] : no murmur [No Rub] : no rub [No Gallop] : no gallop [Clear Lung Fields] : clear lung fields [Good Air Entry] : good air entry [No Respiratory Distress] : no respiratory distress  [Non Tender] : non-tender [No Masses/organomegaly] : no masses/organomegaly [Normal Bowel Sounds] : normal bowel sounds [Normal Gait] : normal gait [No Cyanosis] : no cyanosis [No Clubbing] : no clubbing [No Varicosities] : no varicosities [No Rash] : no rash [No Skin Lesions] : no skin lesions [Moves all extremities] : moves all extremities [No Focal Deficits] : no focal deficits [Normal Speech] : normal speech [Alert and Oriented] : alert and oriented [Normal memory] : normal memory [Soft] : abdomen soft [No Edema] : no edema [de-identified] : JVP approx 8 cm  [de-identified] : developmental delay; well appearing [de-identified] : irreg irreg rhythm, grade II/VI diastolic murmur best heard over pulmonic area; grade III/VI systolic murmur in axilla

## 2024-05-19 NOTE — HISTORY OF PRESENT ILLNESS
[FreeTextEntry1] : Ms. 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 Her other co-morbitidities include cirrhosis (by imaging) and a 1.7 cm L renal mass (unclear etiology). Of note she has a developmental delay but has been able to be live independently. Referred by Dr. Gale. Accompanied by mother.   For full initial details, please refer to note on 4/5/24.   Last visit 4/6/24, she was found to be volume overloaded and was treated with additional torsemide and is currently taking 20 mg daily with no additional doses. Weight has been ranging 106 lbs from 117-119 pounds.  Currently resides alone. Part of [facility] agency. States she can walk > 5 blocks without dyspnea.    Previously was very functional last year but had progressive fatigue with minimal exertion. Had previously been swimming. Also noted purplish discoloration of hands/feet over past year. Currently, she is working 2 days a week without issues. She has an aide 4 days a week and an NP organizes her meds in a dispenser called [facility] [employer] weekly. She is tolerating the up titration of metoprolol 50 mg and losartan 12.5 mg with staggering meds.   Labs 4/20/24 notable for decrease in serum pro BNP to 960 from 3087 and Na 128, K 4.6, BUN/creat 32/0.84 from Na 132.   Participates in Yoga which she has been doing for past 4 years; instructor noted decline in stamina over past year. Stamina slightly improved.   Uses 1 pillow at night; has regular bed. Doesn't endorse orthopnea/PND. Avoids lifting things. SHe can go up 3 steps.

## 2024-05-19 NOTE — CARDIOLOGY SUMMARY
[de-identified] : 5/15/24 afib, RBBB, LAFB, rate 78 bpm 4/5/24 - afib, RBBB, LAFB, PVC [de-identified] : 4/3/24 - EF 25-30%, LVEDD 6 cm, severe LA dilatation, mitral ring w/ moderate-severe MR (mean 4), valve-in-valve triscupid replacement with minimal TR, severe ECHO, mild AI, E/e' 42, LVOT VTI 14 cm, IVC 2.7 cm  9/7/23 - EF 20-25%, severe biatrial dilatation, TVR w/o TR, MVr with mod-severe MR  6/22/23 - TTE. - EF 29%, LVEDD 6 cm, MVr w/ mod MR, TVR (mean 3) w/o TR, RV dilatation, severe biatrial dilatation  1/30/23 - TTE - EF 30-35%, MVr (mean 5) with at least mod MR, RV pressure/volume overload, mild-mod PI, grade 5 TR  3/17/22 - TTE - EF 30-35%, LVEDD 5.5 cm, MVr (mean 5) w/ mild-mod MR, severe LA dilatation, bioTVR w/ severe TR,

## 2024-05-19 NOTE — ASSESSMENT
[FreeTextEntry1] : Ms. 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 Her other co-morbitidities include cirrhosis (by imaging) and a 1.7 cm L renal mass (unclear etiology). Of note she has a developmental delay but has been able to be live independently. Currently has NYHA class II-III symptoms and is compensated on exam. Has mod-severe MR on exam and TTE with a declining EF over last several years. Will attempt to optimize medical therapy and volume status and reassess. If has difficulty tolerating optimal therapy, may consider advanced therapy options (e.g. transplant) however will need to exclude significant cirrhosis with a liver biopsy if needed.  1. HFrEF/NICM  - continue torsemide 20 mg daily with goal weight 106-108 lbs  - continue lee 25 mg twice/day - on losartan 12.5 mg daily; increase to 12.5 mg twice/day (as long as SBP>90); may eventual rechallenge of Entresto however may not tolerate d/t cirrhosis - c/w toprol 50 mg at bedtime (will uptitrate when more compensated) - will benefit from SGLT2i but will defer for now - monitor wieght/BP regularly; inform if SBP <90 - discussed disease process - repeat TTE after 3 months of OMT to determine ICD candidacy  2. Mod-severe MR - likely significant and contributing to symptoms - meds as above and reassess once on optimal therapy   3. Cirrhosis - would benefit from hepatology evaluation; labs notable for Tbili elevation, otherwise synthetic function appears intact - refer to hepatology  RTC 6 weeks with NS ACP, 3 months wit me

## 2024-05-20 ENCOUNTER — NON-APPOINTMENT (OUTPATIENT)
Age: 54
End: 2024-05-20

## 2024-05-21 ENCOUNTER — NON-APPOINTMENT (OUTPATIENT)
Age: 54
End: 2024-05-21

## 2024-05-22 ENCOUNTER — APPOINTMENT (OUTPATIENT)
Dept: CARDIOLOGY | Facility: CLINIC | Age: 54
End: 2024-05-22
Payer: MEDICARE

## 2024-05-22 VITALS
BODY MASS INDEX: 20.98 KG/M2 | HEIGHT: 62 IN | DIASTOLIC BLOOD PRESSURE: 60 MMHG | SYSTOLIC BLOOD PRESSURE: 100 MMHG | OXYGEN SATURATION: 99 % | HEART RATE: 65 BPM | WEIGHT: 114 LBS

## 2024-05-22 DIAGNOSIS — E03.9 HYPOTHYROIDISM, UNSPECIFIED: ICD-10-CM

## 2024-05-22 DIAGNOSIS — E87.1 HYPO-OSMOLALITY AND HYPONATREMIA: ICD-10-CM

## 2024-05-22 DIAGNOSIS — I51.9 HEART DISEASE, UNSPECIFIED: ICD-10-CM

## 2024-05-22 PROCEDURE — 93000 ELECTROCARDIOGRAM COMPLETE: CPT

## 2024-05-22 PROCEDURE — G2211 COMPLEX E/M VISIT ADD ON: CPT

## 2024-05-22 PROCEDURE — 99214 OFFICE O/P EST MOD 30 MIN: CPT

## 2024-05-22 NOTE — ASSESSMENT
[FreeTextEntry1] : Dominique Mejia has congenital heart disease cardiomyopathy status post tricuspid valve replacement mitral annuloplasty now with severe tricuspid insufficiency being evaluated for valve in valve. She recently has developed weight gain facial puffiness and some edema of her lower extremities with some shortness of breath. She also has a renal lesion which needs further evaluation with an MRI. she is status post percutaneous valve in valve replacement with tricuspid valve with no significant residual tricuspid insufficiency on repeat echo postprocedure. She has lost weight and feels well with stable vital signs. She clearly has improved since the tricuspid valve and valve replacement. She recently has gained weight though her echo reveals a well-functioning tricuspid valve in valve with moderate LV dysfunction and moderate mitral regurgitation. On torsemide 20 and off Zaroxolyn she has gained weight which now has decreased somewhat but she remains edematous despite torsemide 20 twice daily and has a soft blood pressure.  her last echocardiogram was in 9/23 and revealed a well-functioning valve in valve in the tricuspid position moderatrel ysevere mitral regurgitation and severe global LV dysfunction with an EF calculated somewhere between 20 and 25%.   Patient is now followed by heart failure and there is been some modification of her medicines with the addition of losartan and increased dose of spironolactone   1. Severe tricuspid insufficiency with weight gain edema facial puffiness- status post valve in valve tricuspid valve replacement with weight loss and improvement in her status.  Her weight now is 127 pounds on March 30, 2024   2. 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 Atrial fibrillation ventricular rate controlled on Coumadin  4.  edema of the lower extremities has pretty much resolved with mild edema  5. Hypothyroidism on Synthroid replacement- now on  Synthroid 175 mcg last TSH normal 6.  Progressive heart failure with low blood pressure now on losartan if blood pressure is over 90 as well as increased dose of spironolactone  Despite her severe LV dysfunction mitral regurgitation, overall she is functioning at a good level able to walk long distances.  Her blood pressures are being slowly tweaked to maintain her blood pressure over 90

## 2024-05-22 NOTE — PHYSICAL EXAM
[Normal Conjunctiva] : normal conjunctiva [Normal S1, S2] : normal S1, S2 [Clear Lung Fields] : clear lung fields [Soft] : abdomen soft [Non Tender] : non-tender [de-identified] : Good spirits awake alert thin (patient is lost over 30 pounds) [de-identified] : S1-S2 irregularly irregular 3 with radiation to the axilla/6 murmur at the apexi there is mild to moderate neck vein distention with slight HJ reflux [de-identified] : No rales rhonchi or wheezes [de-identified] : No peripheral edema today

## 2024-05-22 NOTE — HISTORY OF PRESENT ILLNESS
[FreeTextEntry1] : The patient's regimen has been changed by heart failure.  Her spironolactone was increased and she is now on losartan 12.5 twice daily if her blood pressure is above 90  The patient himself is feeling well without chest pain chest pressure she denies shortness of breath.  She does walk long distances with her mother without difficulty.  She has no dizziness or syncope weight has varied between 108 and 114.  Last ECG was A-fib with controlled ventricular response no change

## 2024-05-22 NOTE — REASON FOR VISIT
[FreeTextEntry1] : Dominique Dinh 54 years old here for follow-up of her cardiac status.  She is now being followed by heart failure Dr. Chang Rodriguez

## 2024-05-22 NOTE — DISCUSSION/SUMMARY
[FreeTextEntry1] : 1.  Continue present regimen of medications 2.  Continue to be active 3.  Follow-up with heart failure 4.  Hold losartan if blood pressure is below 90  Follow-up with me again in 3 months [EKG obtained to assist in diagnosis and management of assessed problem(s)] : EKG obtained to assist in diagnosis and management of assessed problem(s)

## 2024-05-23 ENCOUNTER — NON-APPOINTMENT (OUTPATIENT)
Age: 54
End: 2024-05-23

## 2024-05-23 RX ORDER — LOSARTAN POTASSIUM 25 MG/1
25 TABLET, FILM COATED ORAL
Qty: 30 | Refills: 2 | Status: ACTIVE | COMMUNITY
Start: 2024-04-05 | End: 1900-01-01

## 2024-05-28 ENCOUNTER — RX RENEWAL (OUTPATIENT)
Age: 54
End: 2024-05-28

## 2024-05-28 RX ORDER — WARFARIN 7.5 MG/1
7.5 TABLET ORAL
Qty: 24 | Refills: 0 | Status: ACTIVE | COMMUNITY
Start: 2023-05-23 | End: 1900-01-01

## 2024-05-30 ENCOUNTER — NON-APPOINTMENT (OUTPATIENT)
Age: 54
End: 2024-05-30

## 2024-06-06 ENCOUNTER — NON-APPOINTMENT (OUTPATIENT)
Age: 54
End: 2024-06-06

## 2024-06-10 ENCOUNTER — NON-APPOINTMENT (OUTPATIENT)
Age: 54
End: 2024-06-10

## 2024-07-02 ENCOUNTER — APPOINTMENT (OUTPATIENT)
Dept: HEART FAILURE | Facility: CLINIC | Age: 54
End: 2024-07-02
Payer: MEDICARE

## 2024-07-02 VITALS
BODY MASS INDEX: 20.98 KG/M2 | DIASTOLIC BLOOD PRESSURE: 56 MMHG | HEIGHT: 62 IN | SYSTOLIC BLOOD PRESSURE: 104 MMHG | TEMPERATURE: 97.2 F | WEIGHT: 114 LBS | HEART RATE: 71 BPM | OXYGEN SATURATION: 100 %

## 2024-07-02 DIAGNOSIS — R62.50 UNSPECIFIED LACK OF EXPECTED NORMAL PHYSIOLOGICAL DEVELOPMENT IN CHILDHOOD: ICD-10-CM

## 2024-07-02 DIAGNOSIS — I42.9 CARDIOMYOPATHY, UNSPECIFIED: ICD-10-CM

## 2024-07-02 DIAGNOSIS — I48.91 UNSPECIFIED ATRIAL FIBRILLATION: ICD-10-CM

## 2024-07-02 DIAGNOSIS — Q24.9 CONGENITAL MALFORMATION OF HEART, UNSPECIFIED: ICD-10-CM

## 2024-07-02 DIAGNOSIS — I34.0 NONRHEUMATIC MITRAL (VALVE) INSUFFICIENCY: ICD-10-CM

## 2024-07-02 PROCEDURE — 99214 OFFICE O/P EST MOD 30 MIN: CPT

## 2024-07-02 RX ORDER — DAPAGLIFLOZIN 10 MG/1
10 TABLET, FILM COATED ORAL
Refills: 0 | Status: DISCONTINUED | COMMUNITY
Start: 2024-07-02 | End: 2024-07-02

## 2024-07-03 LAB
ANION GAP SERPL CALC-SCNC: 13 MMOL/L
BUN SERPL-MCNC: 17 MG/DL
CALCIUM SERPL-MCNC: 9.6 MG/DL
CHLORIDE SERPL-SCNC: 96 MMOL/L
CO2 SERPL-SCNC: 25 MMOL/L
CREAT SERPL-MCNC: 0.55 MG/DL
EGFR: 108 ML/MIN/1.73M2
GLUCOSE SERPL-MCNC: 87 MG/DL
MAGNESIUM SERPL-MCNC: 2.1 MG/DL
NT-PROBNP SERPL-MCNC: 1886 PG/ML
POTASSIUM SERPL-SCNC: 4.7 MMOL/L
SODIUM SERPL-SCNC: 134 MMOL/L

## 2024-07-03 RX ORDER — EMPAGLIFLOZIN 10 MG/1
10 TABLET, FILM COATED ORAL
Qty: 90 | Refills: 1 | Status: ACTIVE | COMMUNITY
Start: 2024-07-02 | End: 1900-01-01

## 2024-07-10 ENCOUNTER — LABORATORY RESULT (OUTPATIENT)
Age: 54
End: 2024-07-10

## 2024-07-11 LAB
INR PPP: 1.2 RATIO
PT BLD: 13.5 SEC
T3 SERPL-MCNC: 42 NG/DL
T4 SERPL-MCNC: 7.9 UG/DL
TSH SERPL-ACNC: 0.02 UIU/ML

## 2024-07-16 ENCOUNTER — RX RENEWAL (OUTPATIENT)
Age: 54
End: 2024-07-16

## 2024-07-23 ENCOUNTER — NON-APPOINTMENT (OUTPATIENT)
Age: 54
End: 2024-07-23

## 2024-07-24 ENCOUNTER — NON-APPOINTMENT (OUTPATIENT)
Age: 54
End: 2024-07-24

## 2024-08-08 ENCOUNTER — RX RENEWAL (OUTPATIENT)
Age: 54
End: 2024-08-08

## 2024-08-13 ENCOUNTER — NON-APPOINTMENT (OUTPATIENT)
Age: 54
End: 2024-08-13

## 2024-08-14 ENCOUNTER — NON-APPOINTMENT (OUTPATIENT)
Age: 54
End: 2024-08-14

## 2024-08-14 ENCOUNTER — LABORATORY RESULT (OUTPATIENT)
Age: 54
End: 2024-08-14

## 2024-08-14 ENCOUNTER — APPOINTMENT (OUTPATIENT)
Dept: HEART FAILURE | Facility: CLINIC | Age: 54
End: 2024-08-14
Payer: MEDICARE

## 2024-08-14 VITALS
DIASTOLIC BLOOD PRESSURE: 57 MMHG | SYSTOLIC BLOOD PRESSURE: 105 MMHG | WEIGHT: 110 LBS | HEART RATE: 64 BPM | OXYGEN SATURATION: 100 % | BODY MASS INDEX: 20.24 KG/M2 | HEIGHT: 62 IN

## 2024-08-14 LAB
ALBUMIN SERPL ELPH-MCNC: 4.8 G/DL
ALP BLD-CCNC: 73 U/L
ALT SERPL-CCNC: 26 U/L
ANION GAP SERPL CALC-SCNC: 14 MMOL/L
AST SERPL-CCNC: 27 U/L
BILIRUB SERPL-MCNC: 2.9 MG/DL
BUN SERPL-MCNC: 23 MG/DL
CALCIUM SERPL-MCNC: 9.8 MG/DL
CHLORIDE SERPL-SCNC: 95 MMOL/L
CO2 SERPL-SCNC: 26 MMOL/L
CREAT SERPL-MCNC: 0.58 MG/DL
EGFR: 107 ML/MIN/1.73M2
GLUCOSE SERPL-MCNC: 64 MG/DL
NT-PROBNP SERPL-MCNC: 909 PG/ML
POTASSIUM SERPL-SCNC: 4.9 MMOL/L
PROT SERPL-MCNC: 7.3 G/DL
SODIUM SERPL-SCNC: 134 MMOL/L
TSH SERPL-ACNC: 0.03 UIU/ML

## 2024-08-14 PROCEDURE — 99214 OFFICE O/P EST MOD 30 MIN: CPT

## 2024-08-14 PROCEDURE — 93000 ELECTROCARDIOGRAM COMPLETE: CPT

## 2024-08-14 PROCEDURE — G2211 COMPLEX E/M VISIT ADD ON: CPT

## 2024-08-14 PROCEDURE — 36415 COLL VENOUS BLD VENIPUNCTURE: CPT

## 2024-08-14 RX ORDER — LEVOTHYROXINE SODIUM 150 UG/1
150 CAPSULE ORAL DAILY
Refills: 0 | Status: ACTIVE | COMMUNITY
Start: 2024-08-14

## 2024-08-14 NOTE — ADDENDUM
[FreeTextEntry1] : Called Dominique matt, ([place]) regarding the bloodwork and noted Cr is normal, K is normal. proBNP is elevated at 1886 from 997.  She was volume up on exam - so recommend to take torsemide 20mg QD 7/2 and 7/3. [place] (mom) called and state that Ms Moran lost 5 pounds and she currently weighs 108-109lb. Instructed her to start Ms. Moran her jardiance today and do torsemide x1 tonight. And moving forward, acute weight gain of 3 pounds 1 day or 5 pounds in a week for PRN torsemide  Obtain repeat blood work next week to reflect on SGLT2i

## 2024-08-14 NOTE — CARDIOLOGY SUMMARY
[de-identified] : 8/14/24 - afib, RBBB 5/15/24 afib, RBBB, LAFB, rate 78 bpm 4/5/24 - afib, RBBB, LAFB, PVC [de-identified] : 4/3/24 - EF 25-30%, LVEDD 6 cm, severe LA dilatation, mitral ring w/ moderate-severe MR (mean 4), valve-in-valve triscupid replacement with minimal TR, severe ECHO, mild AI, E/e' 42, LVOT VTI 14 cm, IVC 2.7 cm  9/7/23 - EF 20-25%, severe biatrial dilatation, TVR w/o TR, MVr with mod-severe MR  6/22/23 - TTE. - EF 29%, LVEDD 6 cm, MVr w/ mod MR, TVR (mean 3) w/o TR, RV dilatation, severe biatrial dilatation  1/30/23 - TTE - EF 30-35%, MVr (mean 5) with at least mod MR, RV pressure/volume overload, mild-mod PI, grade 5 TR  3/17/22 - TTE - EF 30-35%, LVEDD 5.5 cm, MVr (mean 5) w/ mild-mod MR, severe LA dilatation, bioTVR w/ severe TR,

## 2024-08-14 NOTE — PHYSICAL EXAM
[Well Developed] : well developed [Well Nourished] : well nourished [No Acute Distress] : no acute distress [Normal Conjunctiva] : normal conjunctiva [No Carotid Bruit] : no carotid bruit [Normal S1, S2] : normal S1, S2 [No Murmur] : no murmur [No Rub] : no rub [No Gallop] : no gallop [Clear Lung Fields] : clear lung fields [Good Air Entry] : good air entry [No Respiratory Distress] : no respiratory distress  [Soft] : abdomen soft [Non Tender] : non-tender [No Masses/organomegaly] : no masses/organomegaly [Normal Bowel Sounds] : normal bowel sounds [Normal Gait] : normal gait [No Edema] : no edema [No Cyanosis] : no cyanosis [No Clubbing] : no clubbing [No Varicosities] : no varicosities [No Rash] : no rash [No Skin Lesions] : no skin lesions [Moves all extremities] : moves all extremities [No Focal Deficits] : no focal deficits [Normal Speech] : normal speech [Alert and Oriented] : alert and oriented [Normal memory] : normal memory [de-identified] : developmental delay; well appearing [de-identified] : JVP approx 6-8 w/o HJR [de-identified] : irreg irreg rhythm, grade II/VI diastolic murmur best heard over pulmonic area; grade III/VI systolic murmur in axilla [de-identified] : no edema; compression stockings

## 2024-08-14 NOTE — HISTORY OF PRESENT ILLNESS
[FreeTextEntry1] : Ms. 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 Her other co-morbitidities include cirrhosis (by imaging) and a 1.7 cm L renal mass (unclear etiology). Of note she has a developmental delay but has been able to be live independently. Referred by Dr. Gale. Accompanied by mother, [place].   For full initial details, please refer to note on 4/5/24.   Since last visit, has been doing very well. Weight has been ranging 106 lbs from 117-119 pounds. Takes torsemide as needed (only once in several weeks).   Has been on losartan 12.5 daily which she is able to tolerate most days but occasionally holds due to BP in 80s. BP generally ranges .   Currently resides alone. Part of [facility] agency. States she can walk > 5 blocks without dyspnea. She currently has good AT and is currently actively swimming and working 2 days a week without issues. She can walk unlimited distance without any SOB/WHITE.   She has an aide 4 days a week and an NP organizes her meds in a dispenser called [employer] weekly. Participates in Yoga which she has been doing for past 4 years; instructor noted decline in stamina over past year but has improved. Uses 1 pillow at night; has regular bed. Endorses excellent appetite and has been eating more calories.   She otherwise denies any CP/palpitation, SOB at rest, LH/dizziness, abdominal pain or distention or BLE edema

## 2024-08-14 NOTE — ASSESSMENT
[FreeTextEntry1] : Ms. 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 Her other co-morbitidities include cirrhosis (by imaging) and a 1.7 cm L renal mass (unclear etiology). Of note she has a developmental delay but has been able to be live independently.   Has mod-severe MR on exam and TTE with a declining EF over last several years. Will attempt to optimize medical therapy and volume status and reassess. If has difficulty tolerating optimal therapy, may consider advanced therapy options (e.g. transplant) however will need to exclude significant cirrhosis with a liver biopsy if needed.  Currently has NYHA class II symptoms and is compensated on exam. Appears euvolemic/hypovolemic at the moment.   1. HFrEF/NICM  - continue torsemide as needed; goal weight 106-108 lbs.  - continue lee 25 mg twice/day - continue with losartan 12.5 mg daily; (as long as SBP>90); may eventually rechallenge of Entresto however may not tolerate d/t cirrhosis - c/w toprol 50 mg at bedtime; HR at goal - repeat labs in office - monitor weight/BP regularly; inform if SBP <90.  - discussed disease process - repeat TTE    2.Mod-severe MR - likely significant and contributing to symptoms - meds as above and reassess once on optimal therapy   3. Cirrhosis - would benefit from hepatology evaluation; labs notable for Tbili elevation, otherwise synthetic function appears intact - refer to hepatology

## 2024-08-14 NOTE — PHYSICAL EXAM
[Well Developed] : well developed [Well Nourished] : well nourished [No Acute Distress] : no acute distress [Normal Conjunctiva] : normal conjunctiva [No Carotid Bruit] : no carotid bruit [Normal S1, S2] : normal S1, S2 [No Murmur] : no murmur [No Rub] : no rub [No Gallop] : no gallop [Clear Lung Fields] : clear lung fields [Good Air Entry] : good air entry [No Respiratory Distress] : no respiratory distress  [Soft] : abdomen soft [Non Tender] : non-tender [No Masses/organomegaly] : no masses/organomegaly [Normal Bowel Sounds] : normal bowel sounds [Normal Gait] : normal gait [No Edema] : no edema [No Cyanosis] : no cyanosis [No Clubbing] : no clubbing [No Varicosities] : no varicosities [No Rash] : no rash [No Skin Lesions] : no skin lesions [Moves all extremities] : moves all extremities [No Focal Deficits] : no focal deficits [Normal Speech] : normal speech [Alert and Oriented] : alert and oriented [Normal memory] : normal memory [de-identified] : developmental delay; well appearing [de-identified] : JVP approx 6-8 w/o HJR [de-identified] : irreg irreg rhythm, grade II/VI diastolic murmur best heard over pulmonic area; grade III/VI systolic murmur in axilla [de-identified] : no edema; compression stockings

## 2024-08-14 NOTE — CARDIOLOGY SUMMARY
[de-identified] : 8/14/24 - afib, RBBB 5/15/24 afib, RBBB, LAFB, rate 78 bpm 4/5/24 - afib, RBBB, LAFB, PVC [de-identified] : 4/3/24 - EF 25-30%, LVEDD 6 cm, severe LA dilatation, mitral ring w/ moderate-severe MR (mean 4), valve-in-valve triscupid replacement with minimal TR, severe ECHO, mild AI, E/e' 42, LVOT VTI 14 cm, IVC 2.7 cm  9/7/23 - EF 20-25%, severe biatrial dilatation, TVR w/o TR, MVr with mod-severe MR  6/22/23 - TTE. - EF 29%, LVEDD 6 cm, MVr w/ mod MR, TVR (mean 3) w/o TR, RV dilatation, severe biatrial dilatation  1/30/23 - TTE - EF 30-35%, MVr (mean 5) with at least mod MR, RV pressure/volume overload, mild-mod PI, grade 5 TR  3/17/22 - TTE - EF 30-35%, LVEDD 5.5 cm, MVr (mean 5) w/ mild-mod MR, severe LA dilatation, bioTVR w/ severe TR,

## 2024-08-14 NOTE — ASSESSMENT
[FreeTextEntry1] : Ms. 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 Her other co-morbitidities include cirrhosis (by imaging) and a 1.7 cm L renal mass (unclear etiology). Of note she has a developmental delay but has been able to be live independently.   Has mod-severe MR on exam and TTE with a declining EF over last several years. Will attempt to optimize medical therapy and volume status and reassess. If has difficulty tolerating optimal therapy, may consider advanced therapy options (e.g. transplant) however will need to exclude significant cirrhosis with a liver biopsy if needed.  Currently has NYHA class II symptoms and is compensated on exam. Appears euvolemic/hypovolemic at the moment.   1. HFrEF/NICM  - continue torsemide as needed; goal weight 106-108 lbs.  - continue lee 25 mg twice/day - continue with losartan 12.5 mg daily; (as long as SBP>90); may eventually rechallenge of Entresto however may not tolerate d/t cirrhosis - c/w toprol 50 mg at bedtime; HR at goal - repeat labs in office - monitor weight/BP regularly; inform if SBP <90.  - discussed disease process - repeat TTE    2.Mod-severe MR - likely significant and contributing to symptoms - meds as above and reassess once on optimal therapy   3. Cirrhosis - would benefit from hepatology evaluation; labs notable for Tbili elevation, otherwise synthetic function appears intact - refer to hepatology

## 2024-08-14 NOTE — HISTORY OF PRESENT ILLNESS
[FreeTextEntry1] : Ms. 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 Her other co-morbitidities include cirrhosis (by imaging) and a 1.7 cm L renal mass (unclear etiology). Of note she has a developmental delay but has been able to be live independently. Referred by Dr. Gale. Accompanied by mother, [place].   For full initial details, please refer to note on 4/5/24.   Since last visit, has been doing very well. Weight has been ranging 106 lbs from 117-119 pounds. Takes torsemide as needed (only once in several weeks).   Has been on losartan 12.5 daily which she is able to tolerate most days but occasionally holds due to BP in 80s. BP generally ranges .   Currently resides alone. Part of [facility]. States she can walk > 5 blocks without dyspnea. She currently has good AT and is currently actively swimming and working 2 days a week without issues. She can walk unlimited distance without any SOB/WHITE.   She has an aide 4 days a week and an NP organizes her meds in a dispenser called [employer] weekly. Participates in Yoga which she has been doing for past 4 years; instructor noted decline in stamina over past year but has improved. Uses 1 pillow at night; has regular bed. Endorses excellent appetite and has been eating more calories.   She otherwise denies any CP/palpitation, SOB at rest, LH/dizziness, abdominal pain or distention or BLE edema

## 2024-08-21 ENCOUNTER — APPOINTMENT (OUTPATIENT)
Dept: CARDIOLOGY | Facility: CLINIC | Age: 54
End: 2024-08-21
Payer: MEDICARE

## 2024-08-21 VITALS
BODY MASS INDEX: 21.16 KG/M2 | HEIGHT: 62 IN | OXYGEN SATURATION: 100 % | SYSTOLIC BLOOD PRESSURE: 105 MMHG | DIASTOLIC BLOOD PRESSURE: 62 MMHG | HEART RATE: 56 BPM | WEIGHT: 115 LBS

## 2024-08-21 DIAGNOSIS — E05.00 THYROTOXICOSIS WITH DIFFUSE GOITER W/OUT THYROTOXIC CRISIS OR STORM: ICD-10-CM

## 2024-08-21 DIAGNOSIS — I42.9 CARDIOMYOPATHY, UNSPECIFIED: ICD-10-CM

## 2024-08-21 DIAGNOSIS — I48.91 UNSPECIFIED ATRIAL FIBRILLATION: ICD-10-CM

## 2024-08-21 DIAGNOSIS — E03.9 HYPOTHYROIDISM, UNSPECIFIED: ICD-10-CM

## 2024-08-21 DIAGNOSIS — I51.9 HEART DISEASE, UNSPECIFIED: ICD-10-CM

## 2024-08-21 DIAGNOSIS — I38 ENDOCARDITIS, VALVE UNSPECIFIED: ICD-10-CM

## 2024-08-21 DIAGNOSIS — Z95.3 PRESENCE OF XENOGENIC HEART VALVE: ICD-10-CM

## 2024-08-21 DIAGNOSIS — Z95.4 PRESENCE OF OTHER HEART-VALVE REPLACEMENT: ICD-10-CM

## 2024-08-21 PROCEDURE — 99214 OFFICE O/P EST MOD 30 MIN: CPT

## 2024-08-21 PROCEDURE — G2211 COMPLEX E/M VISIT ADD ON: CPT

## 2024-08-21 NOTE — PHYSICAL EXAM
[Normal Conjunctiva] : normal conjunctiva [Normal S1, S2] : normal S1, S2 [Clear Lung Fields] : clear lung fields [Soft] : abdomen soft [Non Tender] : non-tender [de-identified] : Good spirits awake alert thin (patient is lost over 30 pounds) [de-identified] : S1-S2 irregularly irregular 3 with radiation to the axilla/6 murmur at the apexi there is mild to moderate neck vein distention with slight HJ reflux [de-identified] : No rales rhonchi or wheezes [de-identified] : No peripheral edema today

## 2024-08-21 NOTE — ASSESSMENT
[FreeTextEntry1] : Dominique Mejia has congenital heart disease cardiomyopathy status post tricuspid valve replacement mitral annuloplasty now with severe tricuspid insufficiency being evaluated for valve in valve. She recently has developed weight gain facial puffiness and some edema of her lower extremities with some shortness of breath. She also has a renal lesion which needs further evaluation with an MRI. she is status post percutaneous valve in valve replacement with tricuspid valve with no significant residual tricuspid insufficiency on repeat echo postprocedure. She has lost weight and feels well with stable vital signs. She clearly has improved since the tricuspid valve and valve replacement. She recently has gained weight though her echo reveals a well-functioning tricuspid valve in valve with moderate LV dysfunction and moderate mitral regurgitation. On torsemide 20 and off Zaroxolyn she has gained weight which now has decreased somewhat but she remains edematous despite torsemide 20 twice daily and has a soft blood pressure.  her last echocardiogram was in 9/23 and revealed a well-functioning valve in valve in the tricuspid position moderatrel ysevere mitral regurgitation and severe global LV dysfunction with an EF calculated somewhere between 20 and 25%.   Patient is now followed by heart failure and there is been some modification of her medicines with the addition of losartan and increased dose of spironolactone She presently in August 2024 appears to be relatively euvolemic with good exercise tolerance and tolerating her present regimen of medication.  She is on losartan 12.5 if her blood pressure is over 90   1. Severe tricuspid insufficiency with weight gain edema facial puffiness- status post valve in valve tricuspid valve replacement with weight loss and improvement in her status.  Her weight now is 127 pounds on March 30, 2024   2. 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 Atrial fibrillation ventricular rate controlled on Coumadin  4.  edema of the lower extremities has pretty much resolved with mild edema  5. Hypothyroidism on Synthroid replacement- now on  Synthroid 175 mcg last TSH normal 6.  Progressive heart failure with low blood pressure now on losartan if blood pressure is over 90 as well as increased dose of spironolactone  Despite her severe LV dysfunction mitral regurgitation, overall she is functioning at a good level able to walk long distances.  Her blood pressures are being slowly tweaked to maintain her blood pressure over 90.  Presently in August 2024 she appears to be relatively euvolemic and has reasonable exercise tolerance able to swim 40 minutes a day

## 2024-08-21 NOTE — HISTORY OF PRESENT ILLNESS
[FreeTextEntry1] : The patient's regimen has been changed by heart failure.  Her spironolactone was increased and she is now on losartan 12.5 twice daily if her blood pressure is above 90  The patient himself is feeling well without chest pain chest pressure she denies shortness of breath.  She does walk long distances with her mother without difficulty.  She has no dizziness or syncope weight has varied between 108 and 114.  Last ECG was A-fib with controlled ventricular response no change  Visit August 21, 2024 The patient was recently seen by heart failure and felt to be possibly somewhat fluid overloaded.  However she has good exercise tolerance she swims for 40 minutes a day has no chest pain.  She denies edema orthopnea or PND.  She is functioning at a good level.  She does have significant mitral regurgitation and a repeat echo has been ordered  Presently she was told that if her weight goes up several pounds she should take torsemide 20 mg.

## 2024-08-21 NOTE — REASON FOR VISIT
[FreeTextEntry1] : Dominique Valencia Rom 54 years old here for follow-up of her cardiac status.  She was recently seen by heart failure Dr. Pittman.  She was seen in follow-up on August 21, 2020

## 2024-08-21 NOTE — DISCUSSION/SUMMARY
[FreeTextEntry1] : 1.  She will continue her present regimen of medication including if her weight goes up to take torsemide and if her blood pressure is below 90 to hold the losartan 2.  Repeat echocardiogram in 3 months at [facility] heart to reevaluate mitral regurgitation  Overall she is clinically stable

## 2024-09-04 RX ORDER — AMOXICILLIN 500 MG/1
500 TABLET, FILM COATED ORAL
Qty: 24 | Refills: 5 | Status: ACTIVE | COMMUNITY
Start: 2024-09-04 | End: 1900-01-01

## 2024-09-10 LAB
INR PPP: 2.19 RATIO
PT BLD: 24.2 SEC

## 2024-09-12 ENCOUNTER — NON-APPOINTMENT (OUTPATIENT)
Age: 54
End: 2024-09-12

## 2024-09-26 ENCOUNTER — RX RENEWAL (OUTPATIENT)
Age: 54
End: 2024-09-26

## 2024-10-15 ENCOUNTER — APPOINTMENT (OUTPATIENT)
Dept: FAMILY MEDICINE | Facility: CLINIC | Age: 54
End: 2024-10-15

## 2024-10-21 ENCOUNTER — APPOINTMENT (OUTPATIENT)
Dept: FAMILY MEDICINE | Facility: CLINIC | Age: 54
End: 2024-10-21
Payer: MEDICARE

## 2024-10-21 ENCOUNTER — MED ADMIN CHARGE (OUTPATIENT)
Age: 54
End: 2024-10-21

## 2024-10-21 PROCEDURE — G0008: CPT

## 2024-10-21 PROCEDURE — 90656 IIV3 VACC NO PRSV 0.5 ML IM: CPT

## 2024-10-23 ENCOUNTER — APPOINTMENT (OUTPATIENT)
Dept: HEPATOLOGY | Facility: CLINIC | Age: 54
End: 2024-10-23

## 2024-10-23 VITALS
TEMPERATURE: 97.1 F | RESPIRATION RATE: 16 BRPM | DIASTOLIC BLOOD PRESSURE: 54 MMHG | OXYGEN SATURATION: 98 % | WEIGHT: 108 LBS | BODY MASS INDEX: 19.88 KG/M2 | HEART RATE: 91 BPM | SYSTOLIC BLOOD PRESSURE: 99 MMHG | HEIGHT: 62 IN

## 2024-10-23 DIAGNOSIS — K76.1 CHRONIC PASSIVE CONGESTION OF LIVER: ICD-10-CM

## 2024-10-23 PROCEDURE — 99204 OFFICE O/P NEW MOD 45 MIN: CPT

## 2024-10-23 RX ORDER — DENOSUMAB 60 MG/ML
INJECTION SUBCUTANEOUS
Refills: 0 | Status: ACTIVE | COMMUNITY

## 2024-10-23 RX ORDER — WARFARIN 5 MG/1
5 TABLET ORAL
Refills: 0 | Status: ACTIVE | COMMUNITY

## 2024-10-28 LAB — INR PPP: 1.4

## 2024-10-29 ENCOUNTER — LABORATORY RESULT (OUTPATIENT)
Age: 54
End: 2024-10-29

## 2024-10-30 LAB
A1AT SERPL-MCNC: 144 MG/DL
AFP-TM SERPL-MCNC: 3.9 NG/ML
HBV CORE IGG+IGM SER QL: NONREACTIVE
HBV SURFACE AB SERPL IA-ACNC: 11.5 MIU/ML
HBV SURFACE AG SER QL: NONREACTIVE
HCV AB SER QL: NONREACTIVE
HCV S/CO RATIO: 0.09 S/CO
HEPATITIS A IGG ANTIBODY: REACTIVE

## 2024-10-31 ENCOUNTER — APPOINTMENT (OUTPATIENT)
Dept: HEART FAILURE | Facility: CLINIC | Age: 54
End: 2024-10-31

## 2024-10-31 VITALS
BODY MASS INDEX: 21.95 KG/M2 | OXYGEN SATURATION: 98 % | DIASTOLIC BLOOD PRESSURE: 69 MMHG | SYSTOLIC BLOOD PRESSURE: 116 MMHG | HEART RATE: 70 BPM | WEIGHT: 120 LBS

## 2024-10-31 DIAGNOSIS — I42.9 CARDIOMYOPATHY, UNSPECIFIED: ICD-10-CM

## 2024-10-31 DIAGNOSIS — I50.810 RIGHT HEART FAILURE, UNSPECIFIED: ICD-10-CM

## 2024-10-31 DIAGNOSIS — I48.91 UNSPECIFIED ATRIAL FIBRILLATION: ICD-10-CM

## 2024-10-31 PROCEDURE — 99214 OFFICE O/P EST MOD 30 MIN: CPT

## 2024-11-21 ENCOUNTER — APPOINTMENT (OUTPATIENT)
Dept: ULTRASOUND IMAGING | Facility: CLINIC | Age: 54
End: 2024-11-21
Payer: MEDICARE

## 2024-11-21 ENCOUNTER — OUTPATIENT (OUTPATIENT)
Dept: OUTPATIENT SERVICES | Facility: HOSPITAL | Age: 54
LOS: 1 days | End: 2024-11-21
Payer: MEDICARE

## 2024-11-21 DIAGNOSIS — K76.1 CHRONIC PASSIVE CONGESTION OF LIVER: ICD-10-CM

## 2024-11-21 DIAGNOSIS — Z98.890 OTHER SPECIFIED POSTPROCEDURAL STATES: Chronic | ICD-10-CM

## 2024-11-21 DIAGNOSIS — Z00.00 ENCOUNTER FOR GENERAL ADULT MEDICAL EXAMINATION WITHOUT ABNORMAL FINDINGS: ICD-10-CM

## 2024-11-21 DIAGNOSIS — Z95.2 PRESENCE OF PROSTHETIC HEART VALVE: Chronic | ICD-10-CM

## 2024-11-21 PROCEDURE — 76700 US EXAM ABDOM COMPLETE: CPT | Mod: 26

## 2024-11-21 PROCEDURE — 76700 US EXAM ABDOM COMPLETE: CPT

## 2024-11-26 ENCOUNTER — APPOINTMENT (OUTPATIENT)
Dept: CARDIOLOGY | Facility: CLINIC | Age: 54
End: 2024-11-26
Payer: MEDICARE

## 2024-11-26 ENCOUNTER — NON-APPOINTMENT (OUTPATIENT)
Age: 54
End: 2024-11-26

## 2024-11-26 VITALS
WEIGHT: 122 LBS | HEART RATE: 71 BPM | SYSTOLIC BLOOD PRESSURE: 102 MMHG | BODY MASS INDEX: 22.31 KG/M2 | OXYGEN SATURATION: 100 % | DIASTOLIC BLOOD PRESSURE: 60 MMHG

## 2024-11-26 DIAGNOSIS — I38 ENDOCARDITIS, VALVE UNSPECIFIED: ICD-10-CM

## 2024-11-26 DIAGNOSIS — E05.00 THYROTOXICOSIS WITH DIFFUSE GOITER W/OUT THYROTOXIC CRISIS OR STORM: ICD-10-CM

## 2024-11-26 DIAGNOSIS — K76.1 CHRONIC PASSIVE CONGESTION OF LIVER: ICD-10-CM

## 2024-11-26 DIAGNOSIS — I42.9 CARDIOMYOPATHY, UNSPECIFIED: ICD-10-CM

## 2024-11-26 DIAGNOSIS — R60.0 LOCALIZED EDEMA: ICD-10-CM

## 2024-11-26 DIAGNOSIS — Z95.4 PRESENCE OF OTHER HEART-VALVE REPLACEMENT: ICD-10-CM

## 2024-11-26 DIAGNOSIS — Z79.01 LONG TERM (CURRENT) USE OF ANTICOAGULANTS: ICD-10-CM

## 2024-11-26 DIAGNOSIS — I51.9 HEART DISEASE, UNSPECIFIED: ICD-10-CM

## 2024-11-26 LAB
INR PPP: 1.28 RATIO
PT BLD: 15.1 SEC

## 2024-11-26 PROCEDURE — 93000 ELECTROCARDIOGRAM COMPLETE: CPT

## 2024-11-26 PROCEDURE — 99214 OFFICE O/P EST MOD 30 MIN: CPT

## 2024-11-26 PROCEDURE — G2211 COMPLEX E/M VISIT ADD ON: CPT

## 2024-11-27 ENCOUNTER — NON-APPOINTMENT (OUTPATIENT)
Age: 54
End: 2024-11-27

## 2024-11-29 ENCOUNTER — RX RENEWAL (OUTPATIENT)
Age: 54
End: 2024-11-29

## 2024-12-16 LAB
INR PPP: 1.49 RATIO
PT BLD: 17.5 SEC

## 2024-12-23 LAB — INR PPP: 1.49

## 2024-12-23 RX ORDER — WARFARIN 10 MG/1
10 TABLET ORAL DAILY
Qty: 90 | Refills: 0 | Status: ACTIVE | COMMUNITY
Start: 2024-12-23 | End: 1900-01-01

## 2024-12-24 ENCOUNTER — APPOINTMENT (OUTPATIENT)
Dept: FAMILY MEDICINE | Facility: CLINIC | Age: 54
End: 2024-12-24
Payer: MEDICARE

## 2024-12-24 VITALS
BODY MASS INDEX: 25.49 KG/M2 | RESPIRATION RATE: 16 BRPM | WEIGHT: 138.5 LBS | HEIGHT: 62 IN | HEART RATE: 77 BPM | OXYGEN SATURATION: 95 % | TEMPERATURE: 95.5 F | SYSTOLIC BLOOD PRESSURE: 110 MMHG | DIASTOLIC BLOOD PRESSURE: 74 MMHG

## 2024-12-24 VITALS — BODY MASS INDEX: 24.97 KG/M2 | WEIGHT: 136.5 LBS

## 2024-12-24 DIAGNOSIS — Z00.00 ENCOUNTER FOR GENERAL ADULT MEDICAL EXAMINATION W/OUT ABNORMAL FINDINGS: ICD-10-CM

## 2024-12-24 DIAGNOSIS — R63.5 ABNORMAL WEIGHT GAIN: ICD-10-CM

## 2024-12-24 DIAGNOSIS — Z71.85 ENCOUNTER FOR IMMUNIZATION SAFETY COUNSELING: ICD-10-CM

## 2024-12-24 DIAGNOSIS — E03.9 HYPOTHYROIDISM, UNSPECIFIED: ICD-10-CM

## 2024-12-24 DIAGNOSIS — K76.1 CHRONIC PASSIVE CONGESTION OF LIVER: ICD-10-CM

## 2024-12-24 DIAGNOSIS — R60.0 LOCALIZED EDEMA: ICD-10-CM

## 2024-12-24 DIAGNOSIS — Q24.9 CONGENITAL MALFORMATION OF HEART, UNSPECIFIED: ICD-10-CM

## 2024-12-24 DIAGNOSIS — I42.9 CARDIOMYOPATHY, UNSPECIFIED: ICD-10-CM

## 2024-12-24 DIAGNOSIS — I48.91 UNSPECIFIED ATRIAL FIBRILLATION: ICD-10-CM

## 2024-12-24 DIAGNOSIS — Z79.01 LONG TERM (CURRENT) USE OF ANTICOAGULANTS: ICD-10-CM

## 2024-12-24 PROCEDURE — G0439: CPT

## 2024-12-25 PROBLEM — Z71.85 IMMUNIZATION COUNSELING: Status: ACTIVE | Noted: 2024-12-25

## 2024-12-25 PROBLEM — Z00.00 ANNUAL PHYSICAL EXAM: Status: ACTIVE | Noted: 2024-12-25

## 2024-12-26 ENCOUNTER — LABORATORY RESULT (OUTPATIENT)
Age: 54
End: 2024-12-26

## 2024-12-26 LAB
25(OH)D3 SERPL-MCNC: 49 NG/ML
ESTIMATED AVERAGE GLUCOSE: 103 MG/DL
HBA1C MFR BLD HPLC: 5.2 %

## 2024-12-27 LAB
APPEARANCE: CLEAR
BACTERIA: NEGATIVE /HPF
BILIRUBIN URINE: NEGATIVE
BLOOD URINE: NEGATIVE
CAST: 0 /LPF
COLOR: YELLOW
EPITHELIAL CELLS: 1 /HPF
GLUCOSE QUALITATIVE U: 250 MG/DL
INR PPP: 1.47 RATIO
KETONES URINE: NEGATIVE MG/DL
LEUKOCYTE ESTERASE URINE: NEGATIVE
MICROSCOPIC-UA: NORMAL
NITRITE URINE: NEGATIVE
PH URINE: 7.5
PROTEIN URINE: NEGATIVE MG/DL
PT BLD: 17.4 SEC
RED BLOOD CELLS URINE: 1 /HPF
SPECIFIC GRAVITY URINE: 1.01
UROBILINOGEN URINE: 0.2 MG/DL
WHITE BLOOD CELLS URINE: 1 /HPF

## 2025-01-02 LAB
ALBUMIN SERPL ELPH-MCNC: 4.8 G/DL
ALP BLD-CCNC: 64 U/L
ALT SERPL-CCNC: 25 U/L
ANION GAP SERPL CALC-SCNC: 12 MMOL/L
AST SERPL-CCNC: 29 U/L
BASOPHILS # BLD AUTO: 0.05 K/UL
BASOPHILS NFR BLD AUTO: 0.9 %
BILIRUB SERPL-MCNC: 1.7 MG/DL
BUN SERPL-MCNC: 17 MG/DL
CALCIUM SERPL-MCNC: 9.7 MG/DL
CHLORIDE SERPL-SCNC: 89 MMOL/L
CHOLEST SERPL-MCNC: 199 MG/DL
CO2 SERPL-SCNC: 29 MMOL/L
CREAT SERPL-MCNC: 0.61 MG/DL
EGFR: 106 ML/MIN/1.73M2
EOSINOPHIL # BLD AUTO: 0.28 K/UL
EOSINOPHIL NFR BLD AUTO: 5.3 %
FOLATE SERPL-MCNC: >20 NG/ML
GLUCOSE SERPL-MCNC: 81 MG/DL
HCT VFR BLD CALC: 41.5 %
HDLC SERPL-MCNC: 88 MG/DL
HGB BLD-MCNC: 13.7 G/DL
IRON SERPL-MCNC: 111 UG/DL
LDLC SERPL CALC-MCNC: 98 MG/DL
LYMPHOCYTES # BLD AUTO: 1.41 K/UL
LYMPHOCYTES NFR BLD AUTO: 26.3 %
MAN DIFF?: NORMAL
MCHC RBC-ENTMCNC: 33 G/DL
MCHC RBC-ENTMCNC: 35.1 PG
MCV RBC AUTO: 106.4 FL
MONOCYTES # BLD AUTO: 0.42 K/UL
MONOCYTES NFR BLD AUTO: 7.9 %
NEUTROPHILS # BLD AUTO: 3.19 K/UL
NEUTROPHILS NFR BLD AUTO: 59.6 %
NONHDLC SERPL-MCNC: 111 MG/DL
PLATELET # BLD AUTO: 171 K/UL
POTASSIUM SERPL-SCNC: 4.1 MMOL/L
PROT SERPL-MCNC: 7.2 G/DL
RBC # BLD: 3.9 M/UL
RBC # FLD: 15.3 %
SODIUM SERPL-SCNC: 130 MMOL/L
TRIGL SERPL-MCNC: 69 MG/DL
TSH SERPL-ACNC: 16.9 UIU/ML
VIT B12 SERPL-MCNC: 1304 PG/ML
WBC # FLD AUTO: 5.36 K/UL

## 2025-01-03 ENCOUNTER — LABORATORY RESULT (OUTPATIENT)
Age: 55
End: 2025-01-03

## 2025-01-07 ENCOUNTER — OUTPATIENT (OUTPATIENT)
Dept: OUTPATIENT SERVICES | Facility: HOSPITAL | Age: 55
LOS: 1 days | End: 2025-01-07
Payer: MEDICARE

## 2025-01-07 ENCOUNTER — APPOINTMENT (OUTPATIENT)
Dept: CV DIAGNOSITCS | Facility: HOSPITAL | Age: 55
End: 2025-01-07

## 2025-01-07 ENCOUNTER — RESULT REVIEW (OUTPATIENT)
Age: 55
End: 2025-01-07

## 2025-01-07 DIAGNOSIS — Z95.2 PRESENCE OF PROSTHETIC HEART VALVE: Chronic | ICD-10-CM

## 2025-01-07 DIAGNOSIS — I42.9 CARDIOMYOPATHY, UNSPECIFIED: ICD-10-CM

## 2025-01-07 DIAGNOSIS — Z98.890 OTHER SPECIFIED POSTPROCEDURAL STATES: Chronic | ICD-10-CM

## 2025-01-07 PROCEDURE — 76376 3D RENDER W/INTRP POSTPROCES: CPT | Mod: 26

## 2025-01-07 PROCEDURE — 76376 3D RENDER W/INTRP POSTPROCES: CPT

## 2025-01-07 PROCEDURE — 93306 TTE W/DOPPLER COMPLETE: CPT

## 2025-01-07 PROCEDURE — 93306 TTE W/DOPPLER COMPLETE: CPT | Mod: 26

## 2025-01-10 ENCOUNTER — NON-APPOINTMENT (OUTPATIENT)
Age: 55
End: 2025-01-10

## 2025-01-10 ENCOUNTER — APPOINTMENT (OUTPATIENT)
Dept: HEART FAILURE | Facility: CLINIC | Age: 55
End: 2025-01-10
Payer: MEDICARE

## 2025-01-10 VITALS
SYSTOLIC BLOOD PRESSURE: 124 MMHG | TEMPERATURE: 98.5 F | DIASTOLIC BLOOD PRESSURE: 64 MMHG | OXYGEN SATURATION: 98 % | HEIGHT: 62 IN | BODY MASS INDEX: 25.76 KG/M2 | HEART RATE: 67 BPM | WEIGHT: 140 LBS | RESPIRATION RATE: 16 BRPM

## 2025-01-10 PROCEDURE — 99214 OFFICE O/P EST MOD 30 MIN: CPT

## 2025-01-10 PROCEDURE — 93000 ELECTROCARDIOGRAM COMPLETE: CPT

## 2025-01-10 PROCEDURE — G2211 COMPLEX E/M VISIT ADD ON: CPT

## 2025-01-10 RX ORDER — DENOSUMAB 60 MG/ML
60 INJECTION SUBCUTANEOUS
Refills: 0 | Status: ACTIVE | COMMUNITY
Start: 2025-01-10

## 2025-01-21 ENCOUNTER — LABORATORY RESULT (OUTPATIENT)
Age: 55
End: 2025-01-21

## 2025-01-23 ENCOUNTER — NON-APPOINTMENT (OUTPATIENT)
Age: 55
End: 2025-01-23

## 2025-02-05 ENCOUNTER — LABORATORY RESULT (OUTPATIENT)
Age: 55
End: 2025-02-05

## 2025-03-25 ENCOUNTER — LABORATORY RESULT (OUTPATIENT)
Age: 55
End: 2025-03-25

## 2025-03-28 ENCOUNTER — RX RENEWAL (OUTPATIENT)
Age: 55
End: 2025-03-28

## 2025-03-28 LAB
ALBUMIN SERPL ELPH-MCNC: 4.8 G/DL
ALP BLD-CCNC: 65 U/L
ALT SERPL-CCNC: 20 U/L
ANION GAP SERPL CALC-SCNC: 11 MMOL/L
AST SERPL-CCNC: 24 U/L
BILIRUB DIRECT SERPL-MCNC: 0.4 MG/DL
BILIRUB INDIRECT SERPL-MCNC: 1.1 MG/DL
BILIRUB SERPL-MCNC: 1.5 MG/DL
BUN SERPL-MCNC: 22 MG/DL
CALCIUM SERPL-MCNC: 9.7 MG/DL
CHLORIDE SERPL-SCNC: 89 MMOL/L
CO2 SERPL-SCNC: 30 MMOL/L
CREAT SERPL-MCNC: 0.72 MG/DL
EGFRCR SERPLBLD CKD-EPI 2021: 98 ML/MIN/1.73M2
GLUCOSE SERPL-MCNC: 98 MG/DL
NT-PROBNP SERPL-MCNC: 761 PG/ML
POTASSIUM SERPL-SCNC: 4.2 MMOL/L
PROT SERPL-MCNC: 7.4 G/DL
SODIUM SERPL-SCNC: 130 MMOL/L

## 2025-04-15 ENCOUNTER — LABORATORY RESULT (OUTPATIENT)
Age: 55
End: 2025-04-15

## 2025-04-22 ENCOUNTER — NON-APPOINTMENT (OUTPATIENT)
Age: 55
End: 2025-04-22

## 2025-04-22 ENCOUNTER — APPOINTMENT (OUTPATIENT)
Dept: HEPATOLOGY | Facility: CLINIC | Age: 55
End: 2025-04-22
Payer: MEDICARE

## 2025-04-22 VITALS
SYSTOLIC BLOOD PRESSURE: 109 MMHG | HEART RATE: 71 BPM | TEMPERATURE: 97.2 F | DIASTOLIC BLOOD PRESSURE: 70 MMHG | BODY MASS INDEX: 26.13 KG/M2 | WEIGHT: 142 LBS | RESPIRATION RATE: 16 BRPM | OXYGEN SATURATION: 98 % | HEIGHT: 62 IN

## 2025-04-22 DIAGNOSIS — K76.1 CHRONIC PASSIVE CONGESTION OF LIVER: ICD-10-CM

## 2025-04-22 DIAGNOSIS — E80.4 GILBERT SYNDROME: ICD-10-CM

## 2025-04-22 PROCEDURE — 99214 OFFICE O/P EST MOD 30 MIN: CPT

## 2025-04-24 ENCOUNTER — APPOINTMENT (OUTPATIENT)
Dept: HEART FAILURE | Facility: CLINIC | Age: 55
End: 2025-04-24
Payer: MEDICARE

## 2025-04-24 ENCOUNTER — NON-APPOINTMENT (OUTPATIENT)
Age: 55
End: 2025-04-24

## 2025-04-24 VITALS
HEART RATE: 69 BPM | SYSTOLIC BLOOD PRESSURE: 107 MMHG | OXYGEN SATURATION: 99 % | TEMPERATURE: 98.4 F | BODY MASS INDEX: 25.95 KG/M2 | WEIGHT: 141 LBS | DIASTOLIC BLOOD PRESSURE: 68 MMHG | HEIGHT: 62 IN

## 2025-04-24 PROCEDURE — 93000 ELECTROCARDIOGRAM COMPLETE: CPT

## 2025-04-24 PROCEDURE — G2211 COMPLEX E/M VISIT ADD ON: CPT

## 2025-04-24 PROCEDURE — 99214 OFFICE O/P EST MOD 30 MIN: CPT

## 2025-04-26 DIAGNOSIS — I50.22 CHRONIC SYSTOLIC (CONGESTIVE) HEART FAILURE: ICD-10-CM

## 2025-05-07 LAB
INR PPP: 2.23 RATIO
PT BLD: 26.3 SEC

## 2025-05-19 ENCOUNTER — RX RENEWAL (OUTPATIENT)
Age: 55
End: 2025-05-19

## 2025-05-27 ENCOUNTER — RX RENEWAL (OUTPATIENT)
Age: 55
End: 2025-05-27

## 2025-06-05 ENCOUNTER — APPOINTMENT (OUTPATIENT)
Dept: ULTRASOUND IMAGING | Facility: CLINIC | Age: 55
End: 2025-06-05
Payer: MEDICARE

## 2025-06-05 ENCOUNTER — OUTPATIENT (OUTPATIENT)
Dept: OUTPATIENT SERVICES | Facility: HOSPITAL | Age: 55
LOS: 1 days | End: 2025-06-05
Payer: MEDICARE

## 2025-06-05 DIAGNOSIS — Z98.890 OTHER SPECIFIED POSTPROCEDURAL STATES: Chronic | ICD-10-CM

## 2025-06-05 DIAGNOSIS — K76.1 CHRONIC PASSIVE CONGESTION OF LIVER: ICD-10-CM

## 2025-06-05 DIAGNOSIS — E80.4 GILBERT SYNDROME: ICD-10-CM

## 2025-06-05 DIAGNOSIS — Z95.2 PRESENCE OF PROSTHETIC HEART VALVE: Chronic | ICD-10-CM

## 2025-06-05 PROCEDURE — 76700 US EXAM ABDOM COMPLETE: CPT | Mod: 26

## 2025-06-05 PROCEDURE — 76700 US EXAM ABDOM COMPLETE: CPT

## 2025-06-13 LAB
INR PPP: 1.92 RATIO
PT BLD: 22.5 SEC

## 2025-06-20 ENCOUNTER — RX RENEWAL (OUTPATIENT)
Age: 55
End: 2025-06-20

## 2025-06-24 ENCOUNTER — APPOINTMENT (OUTPATIENT)
Dept: HEART FAILURE | Facility: CLINIC | Age: 55
End: 2025-06-24
Payer: MEDICARE

## 2025-06-24 VITALS
SYSTOLIC BLOOD PRESSURE: 131 MMHG | OXYGEN SATURATION: 100 % | HEIGHT: 62 IN | TEMPERATURE: 98.1 F | WEIGHT: 137.13 LBS | RESPIRATION RATE: 16 BRPM | HEART RATE: 71 BPM | DIASTOLIC BLOOD PRESSURE: 83 MMHG | BODY MASS INDEX: 25.23 KG/M2

## 2025-06-24 VITALS — DIASTOLIC BLOOD PRESSURE: 75 MMHG | SYSTOLIC BLOOD PRESSURE: 113 MMHG

## 2025-06-24 PROCEDURE — 99214 OFFICE O/P EST MOD 30 MIN: CPT

## 2025-06-24 PROCEDURE — 93000 ELECTROCARDIOGRAM COMPLETE: CPT

## 2025-07-02 PROBLEM — I50.20 HEART FAILURE WITH MILDLY REDUCED EJECTION FRACTION (HFMREF): Status: ACTIVE | Noted: 2025-04-26

## 2025-07-09 LAB
INR PPP: 2.29 RATIO
PT BLD: 26.8 SEC

## 2025-07-17 ENCOUNTER — RX RENEWAL (OUTPATIENT)
Age: 55
End: 2025-07-17

## 2025-07-23 NOTE — PROVIDER CONTACT NOTE (OTHER) - RECOMMENDATIONS
Patients advised to stay in bed for now.
[FreeTextEntry1] : 70yo with a vaginal bulge for the past few months but GYN told her about POP 10 years ago. Has UUI and urinary urgency for the past 10 years and it is exacerbating.   PMH: PD, aortic valve insufficiency, HLD, breast CA (rads, anastrozole) PSH: lumpectomy, cataract, meniscus surgery    Day time voids: 5-6 Nighttime voids: 3 GABINO: rare UUI: daily Urinary urgency: yes Bladder irritants: tea, seltzer, tomatoes, chocolate, citrus  BM: constipation - Miralax   Fecal Incontinence: no Vaginal bulge: yes, no splinting Prior treatment: no  Voiding dysfunction: no incomplete bladder emptying Lower urinary tract/vaginal symptoms: no UTIs in past year, no hematuria  Sexually active: yes, no pain LMP: 50s, no VB Pap smears: 1 year, normal  Labs and chart reviewed: yes
notify the provider

## 2025-07-24 ENCOUNTER — RX RENEWAL (OUTPATIENT)
Age: 55
End: 2025-07-24

## 2025-07-31 ENCOUNTER — LABORATORY RESULT (OUTPATIENT)
Age: 55
End: 2025-07-31

## 2025-07-31 DIAGNOSIS — Z79.01 LONG TERM (CURRENT) USE OF ANTICOAGULANTS: ICD-10-CM

## 2025-07-31 DIAGNOSIS — I48.91 UNSPECIFIED ATRIAL FIBRILLATION: ICD-10-CM

## 2025-08-19 ENCOUNTER — RX RENEWAL (OUTPATIENT)
Age: 55
End: 2025-08-19

## 2025-08-20 ENCOUNTER — LABORATORY RESULT (OUTPATIENT)
Age: 55
End: 2025-08-20

## 2025-08-31 ENCOUNTER — NON-APPOINTMENT (OUTPATIENT)
Age: 55
End: 2025-08-31

## 2025-09-03 ENCOUNTER — LABORATORY RESULT (OUTPATIENT)
Age: 55
End: 2025-09-03

## 2025-09-08 ENCOUNTER — APPOINTMENT (OUTPATIENT)
Dept: FAMILY MEDICINE | Facility: CLINIC | Age: 55
End: 2025-09-08
Payer: MEDICARE

## 2025-09-08 VITALS
TEMPERATURE: 98.1 F | HEIGHT: 62 IN | OXYGEN SATURATION: 98 % | BODY MASS INDEX: 24.29 KG/M2 | WEIGHT: 132 LBS | RESPIRATION RATE: 16 BRPM | DIASTOLIC BLOOD PRESSURE: 82 MMHG | SYSTOLIC BLOOD PRESSURE: 130 MMHG | HEART RATE: 96 BPM

## 2025-09-08 DIAGNOSIS — D64.9 ANEMIA, UNSPECIFIED: ICD-10-CM

## 2025-09-08 DIAGNOSIS — E03.9 HYPOTHYROIDISM, UNSPECIFIED: ICD-10-CM

## 2025-09-08 DIAGNOSIS — E87.1 HYPO-OSMOLALITY AND HYPONATREMIA: ICD-10-CM

## 2025-09-08 DIAGNOSIS — I42.9 CARDIOMYOPATHY, UNSPECIFIED: ICD-10-CM

## 2025-09-08 DIAGNOSIS — Z79.01 LONG TERM (CURRENT) USE OF ANTICOAGULANTS: ICD-10-CM

## 2025-09-08 DIAGNOSIS — S87.80XA: ICD-10-CM

## 2025-09-08 DIAGNOSIS — I48.91 UNSPECIFIED ATRIAL FIBRILLATION: ICD-10-CM

## 2025-09-08 DIAGNOSIS — R39.9 UNSPECIFIED SYMPTOMS AND SIGNS INVOLVING THE GENITOURINARY SYSTEM: ICD-10-CM

## 2025-09-08 PROCEDURE — G2211 COMPLEX E/M VISIT ADD ON: CPT

## 2025-09-08 PROCEDURE — 99215 OFFICE O/P EST HI 40 MIN: CPT

## 2025-09-15 ENCOUNTER — LABORATORY RESULT (OUTPATIENT)
Age: 55
End: 2025-09-15

## 2025-09-16 ENCOUNTER — RX RENEWAL (OUTPATIENT)
Age: 55
End: 2025-09-16

## 2025-09-17 ENCOUNTER — NON-APPOINTMENT (OUTPATIENT)
Age: 55
End: 2025-09-17

## 2025-09-17 LAB
ANION GAP SERPL CALC-SCNC: 17 MMOL/L
BACTERIA UR CULT: NORMAL
BUN SERPL-MCNC: 15 MG/DL
CALCIUM SERPL-MCNC: 9.9 MG/DL
CHLORIDE SERPL-SCNC: 93 MMOL/L
CO2 SERPL-SCNC: 23 MMOL/L
CREAT SERPL-MCNC: 0.63 MG/DL
EGFRCR SERPLBLD CKD-EPI 2021: 104 ML/MIN/1.73M2
GLUCOSE SERPL-MCNC: 102 MG/DL
POTASSIUM SERPL-SCNC: 4.6 MMOL/L
SODIUM SERPL-SCNC: 132 MMOL/L

## (undated) DEVICE — SUT SILK 0 30" TIES

## (undated) DEVICE — ELCTR BOVIE TIP BLADE VALLEYLAB 6.5"

## (undated) DEVICE — ELCTR REM POLYHESIVE ADULT PT RETURN 15FT

## (undated) DEVICE — DRSG OPSITE 2.5 X 2"

## (undated) DEVICE — PACK UNIVERSAL CARDIAC

## (undated) DEVICE — SYR ASEPTO

## (undated) DEVICE — SUCTION YANKAUER NO CONTROL VENT

## (undated) DEVICE — SUT TICRON 5 30" KV-40

## (undated) DEVICE — GLV 8 PROTEGRITY

## (undated) DEVICE — GLV 8.5 PROTEXIS (WHITE)

## (undated) DEVICE — SUT PROLENE 5-0 30" RB-2

## (undated) DEVICE — GLV 7.5 PROTEXIS (WHITE)

## (undated) DEVICE — DRAPE C ARM UNIVERSAL

## (undated) DEVICE — VESSEL LOOP MAXI-RED  0.120" X 16"

## (undated) DEVICE — CONNECTOR STRAIGHT 3/8 X 1/2"

## (undated) DEVICE — SUT ETHIBOND 1 30" CT-1

## (undated) DEVICE — DRAPE FEMORAL ANGIOGRAPHY W TROUGH

## (undated) DEVICE — ELCTR BOVIE PENCIL HANDPIECE ROCKER SWITCH 15FT

## (undated) DEVICE — BLADE SCALPEL SAFETYLOCK #15

## (undated) DEVICE — STOPCOCK 3 WAY HI-FLO

## (undated) DEVICE — DRAPE 1/2 SHEET 40X57"

## (undated) DEVICE — CONNECTOR STRAIGHT 3/8 X 3/8" W LUER LOCK

## (undated) DEVICE — SUT SOFSILK 0 30" V-20

## (undated) DEVICE — SOL NORMOSOL-R PH7.4 1000ML

## (undated) DEVICE — GLV 7 PROTEXIS (WHITE)

## (undated) DEVICE — DRAPE TOWEL BLUE 17" X 24"

## (undated) DEVICE — POSITIONER FOAM EGG CRATE ULNAR 2PCS (PINK)

## (undated) DEVICE — SAW BLADE MICROAIRE STERNUM 1.1X50X42MM

## (undated) DEVICE — FOLEY TRAY 16FR 5CC LF LUBRISIL ADVANCE TEMP CLOSED

## (undated) DEVICE — PREP CHLORAPREP HI-LITE ORANGE 26ML

## (undated) DEVICE — TOURNIQUET SET 12FR (1 RED, 1 BLUE, 1 SNARE) 7"

## (undated) DEVICE — SAW BLADE MICROAIRE STERNUM 1X34X9.4MM

## (undated) DEVICE — DRAIN RESERVOIR FOR JACKSON PRATT 100CC CARDINAL

## (undated) DEVICE — SUT POLYSORB 2-0 30" GS-21 UNDYED

## (undated) DEVICE — GLV 8 PROTEXIS (WHITE)

## (undated) DEVICE — VISITEC 4X4

## (undated) DEVICE — GLV 8 RADIATION

## (undated) DEVICE — LAP PAD 18 X 18"

## (undated) DEVICE — ELCTR HEX BLADE

## (undated) DEVICE — BLADE SCALPEL SAFETYLOCK #11

## (undated) DEVICE — SOL IRR POUR NS 0.9% 500ML

## (undated) DEVICE — SUT BIOSYN 4-0 18" P-12

## (undated) DEVICE — BLADE SCALPEL SAFETYLOCK #10

## (undated) DEVICE — DRAPE INSTRUMENT POUCH 6.75" X 11"

## (undated) DEVICE — MEDICATION LABELS W MARKER

## (undated) DEVICE — GOWN TRIMAX XXL

## (undated) DEVICE — DRAPE 3/4 SHEET W REINFORCEMENT 56X77"

## (undated) DEVICE — DRSG OPSITE 13.75 X 4"

## (undated) DEVICE — VENODYNE/SCD SLEEVE CALF MEDIUM

## (undated) DEVICE — GOWN TRIMAX LG

## (undated) DEVICE — SUT PROLENE 5-0 36" RB-1

## (undated) DEVICE — SOL IRR POUR H2O 250ML

## (undated) DEVICE — DRSG TEGADERM 6"X8"

## (undated) DEVICE — SPECIMEN CONTAINER 100ML

## (undated) DEVICE — WARMING BLANKET FULL UNDERBODY

## (undated) DEVICE — SUT PROLENE 4-0 36" BB